# Patient Record
Sex: FEMALE | Race: WHITE | NOT HISPANIC OR LATINO | Employment: OTHER | URBAN - METROPOLITAN AREA
[De-identification: names, ages, dates, MRNs, and addresses within clinical notes are randomized per-mention and may not be internally consistent; named-entity substitution may affect disease eponyms.]

---

## 2017-01-17 ENCOUNTER — MYC MEDICAL ADVICE (OUTPATIENT)
Dept: FAMILY MEDICINE | Facility: CLINIC | Age: 72
End: 2017-01-17

## 2017-01-17 DIAGNOSIS — M17.12 PRIMARY OSTEOARTHRITIS OF LEFT KNEE: Primary | ICD-10-CM

## 2017-01-23 ENCOUNTER — HOSPITAL ENCOUNTER (OUTPATIENT)
Dept: MRI IMAGING | Facility: CLINIC | Age: 72
Discharge: HOME | End: 2017-01-23
Attending: FAMILY MEDICINE | Admitting: FAMILY MEDICINE
Payer: COMMERCIAL

## 2017-01-23 ENCOUNTER — RADIANT APPOINTMENT (OUTPATIENT)
Dept: GENERAL RADIOLOGY | Facility: CLINIC | Age: 72
End: 2017-01-23
Attending: FAMILY MEDICINE
Payer: COMMERCIAL

## 2017-01-23 ENCOUNTER — OFFICE VISIT (OUTPATIENT)
Dept: FAMILY MEDICINE | Facility: CLINIC | Age: 72
End: 2017-01-23
Payer: COMMERCIAL

## 2017-01-23 VITALS
SYSTOLIC BLOOD PRESSURE: 133 MMHG | WEIGHT: 205 LBS | BODY MASS INDEX: 35 KG/M2 | HEART RATE: 70 BPM | DIASTOLIC BLOOD PRESSURE: 77 MMHG | RESPIRATION RATE: 18 BRPM | HEIGHT: 64 IN

## 2017-01-23 DIAGNOSIS — M25.562 ACUTE PAIN OF LEFT KNEE: ICD-10-CM

## 2017-01-23 DIAGNOSIS — E66.9 OBESITY (BMI 35.0-39.9 WITHOUT COMORBIDITY): Primary | ICD-10-CM

## 2017-01-23 DIAGNOSIS — M25.562 LEFT KNEE PAIN: ICD-10-CM

## 2017-01-23 PROCEDURE — 99214 OFFICE O/P EST MOD 30 MIN: CPT | Performed by: FAMILY MEDICINE

## 2017-01-23 PROCEDURE — 73560 X-RAY EXAM OF KNEE 1 OR 2: CPT | Mod: LT

## 2017-01-23 PROCEDURE — 73721 MRI JNT OF LWR EXTRE W/O DYE: CPT | Mod: LT

## 2017-01-23 ASSESSMENT — PAIN SCALES - GENERAL: PAINLEVEL: MODERATE PAIN (5)

## 2017-01-23 NOTE — PROGRESS NOTES
"  SUBJECTIVE:                                                    Marilee Marks is a 71 year old female who presents to clinic today for the following health issues:      Chief Complaint   Patient presents with     Knee Pain     Patient is having discomfort left knee X 2 weeks. Patient feels like she could have twisted it feeding the birds but not sure. Patient is unable to walk. Patient rates pain at 5-10/10. Patient has been using up her old vicodin and ibu 800 which is not of help. Patient has noticed swelling. No redness noted.        Given a steroid injection - Decadron 8 mg 6 days ago.  Uncertain if this was around the pes anserine bursa or intra-articular.  There is no warmth or significant swelling.      She has not had any injury but maybe twisted wrong a few weeks ago.  She has had significant pain with walking or standing.  It's not locking or giving out.    /77 mmHg  Pulse 70  Resp 18  Ht 5' 4\" (1.626 m)  Wt 205 lb (92.987 kg)  BMI 35.17 kg/m2  Breastfeeding? No  EXAM: GENERAL APPEARANCE ADULT: Alert, no acute distress  MS: knee exam swelling-over pes anserine bursa with mild ecchymosis (post injection?), range of motion decreased in flexion and lacking about 10 degrees of extension, exam limited by acuity of pain, positive Terra sign-with external rotation    ASSESSMENT/PLAN:      ICD-10-CM    1. Obesity (BMI 35.0-39.9 without comorbidity) (H) E66.9 Phentermine HCl 37.5 MG TBDP   2. Acute pain of left knee M25.562 MR Knee Left w/o Contrast     CANCELED: MR Low Ext Joint Lt w/o Contrast     Suspect possible meniscus tear based on exam and degree of pain.    MRI for further evaluation, has apt with Dr. Deras on Thursday.    Phentermine refilled, uses sparingly.    Tomasa Rios M.D.      Patient Instructions         Thank you for choosing [facility].  You may be receiving a survey in the mail from [employer] regarding your visit today.  Please take a few minutes to " complete and return the survey to let us know how we are doing.      Our Clinic hours are:  Mondays    7:20 am - 7 pm  Tues -  Fri  7:20 am - 5 pm    Clinic Phone: [phone]    The clinic lab opens at 7:30 am Mon - Fri and appointments are required.    [facility]  Ph. [phone]  Monday-Thursday 8 am - 7pm  Tues/Wed/Fri 8 am - 5:30 pm

## 2017-01-23 NOTE — PATIENT INSTRUCTIONS
Thank you for choosing [facility].  You may be receiving a survey in the mail from [facility] regarding your visit today.  Please take a few minutes to complete and return the survey to let us know how we are doing.      Our Clinic hours are:  Mondays    7:20 am - 7 pm  Tues -  Fri  7:20 am - 5 pm    Clinic Phone: [phone]    The clinic lab opens at 7:30 am Mon - Fri and appointments are required.    [facility]. [phone]  Monday-Thursday 8 am - 7pm  Tues/Wed/Fri 8 am - 5:30 pm

## 2017-01-23 NOTE — NURSING NOTE
"Chief Complaint   Patient presents with     Knee Pain     Patient is having discomfort left knee X 2 weeks. Patient feels like she could have twisted it feeding the birds but not sure. Patient is unable to walk. Patient rates pain at 5-10/10. Patient has been using up her old vicodin and ibu 800 which is not of help. Patient has noticed swelling. No redness noted.        Initial /66 mmHg  Pulse 70  Resp 18  Ht 5' 4\" (1.626 m)  Wt 205 lb (92.987 kg)  BMI 35.17 kg/m2  Breastfeeding? No Estimated body mass index is 35.17 kg/(m^2) as calculated from the following:    Height as of this encounter: 5' 4\" (1.626 m).    Weight as of this encounter: 205 lb (92.987 kg).  BP completed using cuff size: large    "

## 2017-01-23 NOTE — MR AVS SNAPSHOT
After Visit Summary   1/23/2017    Marilee Marks    MRN: [id number]           Patient Information     Date Of Birth          1945        Visit Information        Provider Department      1/23/2017 12:40 PM Tomasa Rios MD [facility]        Today's Diagnoses     Obesity (BMI 35.0-39.9 without comorbidity) (H)    -  1     Acute pain of left knee           Care Instructions          Thank you for choosing [facility].  You may be receiving a survey in the mail from [employer] regarding your visit today.  Please take a few minutes to complete and return the survey to let us know how we are doing.      Our Clinic hours are:  Mondays    7:20 am - 7 pm  Tues -  Fri  7:20 am - 5 pm    Clinic Phone: [phone]    The clinic lab opens at 7:30 am Mon - Fri and appointments are required.    [facility]  Ph. [phone]  Monday-Thursday 8 am - 7pm  Tues/Wed/Fri 8 am - 5:30 pm               Follow-ups after your visit        Future tests that were ordered for you today     Open Future Orders        Priority Expected Expires Ordered    MR Low Ext Joint Lt w/o Contrast Routine  1/23/2018 1/23/2017            Who to contact     If you have questions or need follow up information about today's clinic visit or your schedule please contact [facility] directly at [phone].  Normal or non-critical lab and imaging results will be communicated to you by MyChart, letter or phone within 4 business days after the clinic has received the results. If you do not hear from us within 7 days, please contact the clinic through MyChart or phone. If you have a critical or abnormal lab result, we will notify you by phone as soon as possible.  Submit refill requests through [employer] or call your pharmacy and they will forward the refill request to us. Please allow 3 business days for your refill to be completed.          Additional Information About Your  "Visit        MyChart Information     [employer] gives you secure access to your electronic health record. If you see a primary care provider, you can also send messages to your care team and make appointments. If you have questions, please call your primary care clinic.  If you do not have a primary care provider, please call [phone] and they will assist you.        Care EveryWhere ID     This is your Care EveryWhere ID. This could be used by other organizations to access your [place] medical records  [id number]        Your Vitals Were     Pulse Respirations Height BMI (Body Mass Index) Breastfeeding?       70 18 5' 4\" (1.626 m) 35.17 kg/m2 No        Blood Pressure from Last 3 Encounters:   01/23/17 133/77   10/26/16 134/71   09/19/16 144/70    Weight from Last 3 Encounters:   01/23/17 205 lb (92.987 kg)   12/19/16 200 lb (90.719 kg)   10/26/16 215 lb (97.523 kg)                 Where to get your medicines      Some of these will need a paper prescription and others can be bought over the counter.  Ask your nurse if you have questions.     Bring a paper prescription for each of these medications    - Phentermine HCl 37.5 MG Tbdp       Primary Care Provider Office Phone # Fax #    Tomsaa Rios [phone]       [facility] [address] [facility] [address]        Thank you!     Thank you for choosing [facility]  for your care. Our goal is always to provide you with excellent care. Hearing back from our patients is one way we can continue to improve our services. Please take a few minutes to complete the written survey that you may receive in the mail after your visit with us. Thank you!             Your Updated Medication List - Protect others around you: Learn how to safely use, store and throw away your medicines at www.disposemymeds.org.          This list is accurate as of: 1/23/17  1:12 PM.  Always use your most recent med list.                   Brand Name " Dispense Instructions for use    CALTRATE 600+D 600-400 MG-UNIT per tablet   Generic drug:  calcium-vitamin D      Take one tablet by mouth every day       CENTRUM SILVER per tablet      Take one tablet by mouth every day       ibuprofen 800 MG tablet    ADVIL/MOTRIN    90 tablet    Take 1 tablet (800 mg) by mouth every 8 hours as needed for moderate pain       levothyroxine 137 MCG tablet    SYNTHROID/LEVOTHROID    5 tablet    Take 1 tablet (137 mcg) by mouth daily       Phentermine HCl 37.5 MG Tbdp     15 tablet    Take 18.75 mg by mouth daily 1/2 tablet daily by mouth       topiramate 50 MG tablet    TOPAMAX    10 tablet    Take 1 tablet (50 mg) by mouth 2 times daily       vitamin D3 1000 UNITS Caps      5000mg tablet,  1tab daily

## 2017-01-24 ENCOUNTER — MYC MEDICAL ADVICE (OUTPATIENT)
Dept: FAMILY MEDICINE | Facility: CLINIC | Age: 72
End: 2017-01-24

## 2017-01-24 DIAGNOSIS — E66.9 OBESITY (BMI 35.0-39.9 WITHOUT COMORBIDITY): Primary | ICD-10-CM

## 2017-01-24 NOTE — TELEPHONE ENCOUNTER
Dr. Rios,    Please see my chart message about the need to change pharmacies for her phentermine.  I have sent her a my chart message making sure it is not a prior auth she needs. Please advise. Rosemary BLISS RN

## 2017-01-24 NOTE — TELEPHONE ENCOUNTER
Dr. Rios,    Patient sent my chart message back to us.  So the jest is her insurance does not pay for this any longer so she just wants it filled at [facility] [facility].  Rosemary BLISS RN

## 2017-01-25 NOTE — PROGRESS NOTES
Quick Note:    Discussed with patient. Has apt with Dr. Deras tomorrow.    Tomasa Rios M.D.    ______

## 2017-02-24 ENCOUNTER — TELEPHONE (OUTPATIENT)
Dept: FAMILY MEDICINE | Facility: CLINIC | Age: 72
End: 2017-02-24

## 2017-02-24 DIAGNOSIS — Z11.59 NEED FOR HEPATITIS C SCREENING TEST: Primary | ICD-10-CM

## 2017-02-24 NOTE — TELEPHONE ENCOUNTER
Hep C screen ordered.     Depression removed from  screen and problem list.     Tomasa Rios M.D.

## 2017-02-24 NOTE — TELEPHONE ENCOUNTER
Patient request Hep C screening be ordered as future. Patient also would like to have depression taken off problem list.     Patient also wanted to inform you she is continuing to lose weight.     Sigrid Glover MA

## 2017-03-17 DIAGNOSIS — E03.9 HYPOTHYROIDISM, UNSPECIFIED TYPE: ICD-10-CM

## 2017-03-17 NOTE — TELEPHONE ENCOUNTER
L-Thyroxine   Last Written Prescription Date: 12/26/16  Last Quantity: 90, # refills: 3  Last Office Visit with [facility], [facility] or [facility] prescribing provider: 01/23/17        TSH   Date Value Ref Range Status   09/19/2016 5.96 (H) 0.40 - 4.00 mU/L Final

## 2017-03-23 RX ORDER — LEVOTHYROXINE SODIUM 137 UG/1
137 TABLET ORAL DAILY
Qty: 30 TABLET | Refills: 0 | Status: SHIPPED | OUTPATIENT
Start: 2017-03-23 | End: 2017-04-24

## 2017-04-09 DIAGNOSIS — M79.10 MYALGIA: ICD-10-CM

## 2017-04-10 ENCOUNTER — TRANSFERRED RECORDS (OUTPATIENT)
Dept: HEALTH INFORMATION MANAGEMENT | Facility: CLINIC | Age: 72
End: 2017-04-10

## 2017-04-10 NOTE — TELEPHONE ENCOUNTER
Ibuprofen      Last Written Prescription Date: 12/16/16  Last Quantity: 90, # refills: 2  Last Office Visit with [facility], [facility] or [facility] prescribing provider: 01/23/17       Creatinine   Date Value Ref Range Status   01/29/2016 0.68 0.52 - 1.04 mg/dL Final     Lab Results   Component Value Date    AST 26 06/29/2015     Lab Results   Component Value Date    ALT 37 06/29/2015     BP Readings from Last 3 Encounters:   01/23/17 133/77   10/26/16 134/71   09/19/16 144/70

## 2017-04-12 RX ORDER — IBUPROFEN 800 MG/1
TABLET, FILM COATED ORAL
Qty: 90 TABLET | Refills: 1 | Status: SHIPPED | OUTPATIENT
Start: 2017-04-12 | End: 2018-05-19

## 2017-04-17 ENCOUNTER — TELEPHONE (OUTPATIENT)
Dept: FAMILY MEDICINE | Facility: CLINIC | Age: 72
End: 2017-04-17

## 2017-04-24 ENCOUNTER — OFFICE VISIT (OUTPATIENT)
Dept: FAMILY MEDICINE | Facility: CLINIC | Age: 72
End: 2017-04-24
Payer: COMMERCIAL

## 2017-04-24 VITALS
RESPIRATION RATE: 18 BRPM | DIASTOLIC BLOOD PRESSURE: 68 MMHG | BODY MASS INDEX: 33.63 KG/M2 | HEART RATE: 64 BPM | WEIGHT: 197 LBS | HEIGHT: 64 IN | SYSTOLIC BLOOD PRESSURE: 140 MMHG

## 2017-04-24 DIAGNOSIS — E66.09 NON MORBID OBESITY DUE TO EXCESS CALORIES: ICD-10-CM

## 2017-04-24 DIAGNOSIS — E03.9 HYPOTHYROIDISM, UNSPECIFIED TYPE: ICD-10-CM

## 2017-04-24 DIAGNOSIS — Z11.59 NEED FOR HEPATITIS C SCREENING TEST: ICD-10-CM

## 2017-04-24 DIAGNOSIS — M19.012 ARTHRITIS OF SHOULDER REGION, LEFT: Primary | ICD-10-CM

## 2017-04-24 LAB — TSH SERPL DL<=0.005 MIU/L-ACNC: 0.39 MU/L (ref 0.4–4)

## 2017-04-24 PROCEDURE — 84439 ASSAY OF FREE THYROXINE: CPT | Performed by: FAMILY MEDICINE

## 2017-04-24 PROCEDURE — 86803 HEPATITIS C AB TEST: CPT | Performed by: FAMILY MEDICINE

## 2017-04-24 PROCEDURE — 36415 COLL VENOUS BLD VENIPUNCTURE: CPT | Performed by: FAMILY MEDICINE

## 2017-04-24 PROCEDURE — 84443 ASSAY THYROID STIM HORMONE: CPT | Performed by: FAMILY MEDICINE

## 2017-04-24 PROCEDURE — 99214 OFFICE O/P EST MOD 30 MIN: CPT | Performed by: FAMILY MEDICINE

## 2017-04-24 RX ORDER — LEVOTHYROXINE SODIUM 137 UG/1
137 TABLET ORAL DAILY
Qty: 90 TABLET | Refills: 3 | Status: SHIPPED | OUTPATIENT
Start: 2017-04-24 | End: 2017-04-24

## 2017-04-24 RX ORDER — TOPIRAMATE 50 MG/1
50 TABLET, FILM COATED ORAL 2 TIMES DAILY
Qty: 180 TABLET | Refills: 1 | Status: SHIPPED | OUTPATIENT
Start: 2017-04-24 | End: 2017-12-03

## 2017-04-24 RX ORDER — LEVOTHYROXINE SODIUM 137 UG/1
137 TABLET ORAL DAILY
Qty: 90 TABLET | Refills: 3 | Status: SHIPPED | OUTPATIENT
Start: 2017-04-24 | End: 2018-02-20 | Stop reason: DRUGHIGH

## 2017-04-24 RX ORDER — PHENTERMINE HYDROCHLORIDE 15 MG/1
15 CAPSULE ORAL EVERY MORNING
Qty: 30 CAPSULE | Refills: 1 | Status: SHIPPED | OUTPATIENT
Start: 2017-04-24 | End: 2018-01-11

## 2017-04-24 ASSESSMENT — PAIN SCALES - GENERAL: PAINLEVEL: MODERATE PAIN (5)

## 2017-04-24 NOTE — NURSING NOTE
"Chief Complaint   Patient presents with     Shoulder     Patient would like bilateral shoulders looked at. Patient chiropractor thinks it's muscular. Patient will notice cracking noises in left arm. Patient rates pain 5/10. Patient is not able to take IBU due to stress fracture.        Initial /68  Pulse 66  Resp 18  Ht 5' 4\" (1.626 m)  Wt 197 lb (89.4 kg)  Breastfeeding? No  BMI 33.81 kg/m2 Estimated body mass index is 33.81 kg/(m^2) as calculated from the following:    Height as of this encounter: 5' 4\" (1.626 m).    Weight as of this encounter: 197 lb (89.4 kg).  Medication Reconciliation: complete    "

## 2017-04-24 NOTE — PROGRESS NOTES
SUBJECTIVE:                                                    Marilee Marks is a 71 year old female who presents to clinic today for the following health issues:      Chief Complaint   Patient presents with     Shoulder     Patient would like bilateral shoulders looked at. Patient chiropractor thinks it's muscular. Patient will notice cracking noises in left arm. Patient rates pain 5/10. Patient is not able to take IBU due to stress fracture.      Body mass index is 33.81 kg/(m^2).    Wt Readings from Last 5 Encounters:   04/24/17 197 lb (89.4 kg)   01/23/17 205 lb (93 kg)   12/19/16 200 lb (90.7 kg)   10/26/16 215 lb (97.5 kg)   09/19/16 225 lb 6.4 oz (102.2 kg)       SUBJECTIVE:  Marilee Marks, a 71 year old female scheduled an appointment to discuss the following issues:     Arthritis of shoulder region, left  Hypothyroidism, unspecified type  Need for hepatitis C screening test  Non morbid obesity due to excess calories    Patient is here to discuss shoulder pain.  She feels it's arthritis but her chiropractor was trying to tell her it was muscular.  We did imaging a year ago showing some glenohumeral subluxation and moderate OA.  She has moderate pain but really not enough to take something regularly.  She has been off NSAIDS for several months after a stress fracture of the tibial plateau.  She feels that she notices the shoulder more off of NSAIDS.  We talked about doing acetaminophen 1000 mg twice daily and trying to avoid going back to ibuprofen and reserving that for more severe pain.     She is also encouraged by her weight loss - doing the [employer] Plan, a corwin based diet plan that seems to be more paleo in nature and avoiding white breads/carbs.   She would like to still have phentermine on hand for when she knows she's going to have a hard time (parties, holidays, etc).  But is doing well on the topamax 50 mg twice daily.     Hypothyroidism Follow-up      Since last visit, patient  "describes the following symptoms: Weight loss (intentional), no hair loss, no skin changes, no constipation, no loose stools         Medical, social, surgical, and family histories reviewed.    ROS:  5 point ROS negative except as noted above in HPI, including Gen., Resp., CV, GI &  system review.    OBJECTIVE:  /68  Pulse 64  Resp 18  Ht 5' 4\" (1.626 m)  Wt 197 lb (89.4 kg)  Breastfeeding? No  BMI 33.81 kg/m2  EXAM:  GENERAL APPEARANCE ADULT: Alert, no acute distress  MS: extremities normal, no peripheral edema  shoulder exam: appearance normal, range of motion normal, some crepitus  PSYCH: mentation appears normal., affect and mood normal    ASSESSMENT/PLAN:    (M19.90) Arthritis of shoulder region, left  (primary encounter diagnosis)  Comment:  Tylenol 1000 mg twice daily   Plan: T4 free             (E03.9) Hypothyroidism, unspecified type  Comment:  TSH slightly low but T4 is normal, continue current dose of levothyroxine   Plan: TSH with free T4 reflex, levothyroxine         (SYNTHROID/LEVOTHROID) 137 MCG tablet,         DISCONTINUED: levothyroxine         (SYNTHROID/LEVOTHROID) 137 MCG tablet             (Z11.59) Need for hepatitis C screening test  Comment:    Plan:  Screening pending    (E66.09) Non morbid obesity due to excess calories  Comment:    Plan: topiramate (TOPAMAX) 50 MG tablet, phentermine         15 MG capsule         Understands the phentermine is not meant to be long term and not daily.     Tomasa Rios M.D.          Patient Instructions         Thank you for choosing [facility].  You may be receiving a survey in the mail from [employer] regarding your visit today.  Please take a few minutes to complete and return the survey to let us know how we are doing.      Our Clinic hours are:  Mondays    7:20 am - 7 pm  Tues -  Fri  7:20 am - 5 pm    Clinic Phone: [phone]    The clinic lab opens at 7:30 am Mon - Fri and appointments are required.    [facility]. [phone]  Monday-Thursday 8 am - 7pm  Tues/Wed/Fri 8 am - 5:30 pm

## 2017-04-24 NOTE — PATIENT INSTRUCTIONS
Thank you for choosing [facility].  You may be receiving a survey in the mail from [facility] regarding your visit today.  Please take a few minutes to complete and return the survey to let us know how we are doing.      Our Clinic hours are:  Mondays    7:20 am - 7 pm  Tues -  Fri  7:20 am - 5 pm    Clinic Phone: [phone]    The clinic lab opens at 7:30 am Mon - Fri and appointments are required.    [facility]. [phone]  Monday-Thursday 8 am - 7pm  Tues/Wed/Fri 8 am - 5:30 pm

## 2017-04-24 NOTE — MR AVS SNAPSHOT
After Visit Summary   4/24/2017    Marilee Marks    MRN: [id number]           Patient Information     Date Of Birth          1945        Visit Information        Provider Department      4/24/2017 10:40 AM Tomasa Rios MD [facility]        Today's Diagnoses     Arthritis of shoulder region, left    -  1    Hypothyroidism, unspecified type        Need for hepatitis C screening test        Morbid obesity, unspecified obesity type (H)          Care Instructions          Thank you for choosing [facility].  You may be receiving a survey in the mail from [employer] regarding your visit today.  Please take a few minutes to complete and return the survey to let us know how we are doing.      Our Clinic hours are:  Mondays    7:20 am - 7 pm  Tues -  Fri  7:20 am - 5 pm    Clinic Phone: [phone]    The clinic lab opens at 7:30 am Mon - Fri and appointments are required.    [facility]  Ph. [phone]  Monday-Thursday 8 am - 7pm  Tues/Wed/Fri 8 am - 5:30 pm               Follow-ups after your visit        Who to contact     If you have questions or need follow up information about today's clinic visit or your schedule please contact [facility] directly at [phone].  Normal or non-critical lab and imaging results will be communicated to you by [employer], letter or phone within 4 business days after the clinic has received the results. If you do not hear from us within 7 days, please contact the clinic through [employer] or phone. If you have a critical or abnormal lab result, we will notify you by phone as soon as possible.  Submit refill requests through [employer] or call your pharmacy and they will forward the refill request to us. Please allow 3 business days for your refill to be completed.          Additional Information About Your Visit        AgFlow Information     [employer] gives you secure access to your electronic health  "record. If you see a primary care provider, you can also send messages to your care team and make appointments. If you have questions, please call your primary care clinic.  If you do not have a primary care provider, please call [phone] and they will assist you.        Care EveryWhere ID     This is your Care EveryWhere ID. This could be used by other organizations to access your [place] medical records  [id number]        Your Vitals Were     Pulse Respirations Height Breastfeeding? BMI (Body Mass Index)       64 18 5' 4\" (1.626 m) No 33.81 kg/m2        Blood Pressure from Last 3 Encounters:   04/24/17 140/68   01/23/17 133/77   10/26/16 134/71    Weight from Last 3 Encounters:   04/24/17 197 lb (89.4 kg)   01/23/17 205 lb (93 kg)   12/19/16 200 lb (90.7 kg)              We Performed the Following     Hepatitis C antibody     TSH with free T4 reflex          Today's Medication Changes          These changes are accurate as of: 4/24/17 12:04 PM.  If you have any questions, ask your nurse or doctor.               Start taking these medicines.        Dose/Directions    levothyroxine 137 MCG tablet   Commonly known as:  SYNTHROID/LEVOTHROID   Used for:  Hypothyroidism, unspecified type   Started by:  Tomasa Rios MD        Dose:  137 mcg   Take 1 tablet (137 mcg) by mouth daily   Quantity:  90 tablet   Refills:  3       phentermine 15 MG capsule   Used for:  Morbid obesity, unspecified obesity type (H)   Replaces:  Phentermine HCl 37.5 MG Tbdp   Started by:  Tomasa Rios MD        Dose:  15 mg   Take 1 capsule (15 mg) by mouth every morning   Quantity:  30 capsule   Refills:  1         Stop taking these medicines if you haven't already. Please contact your care team if you have questions.     Phentermine HCl 37.5 MG Tbdp   Replaced by:  phentermine 15 MG capsule   Stopped by:  Tomasa Rios MD                Where to get your medicines      These medications were sent to [facility] " - [place], [place] - [address]  [address] [address]     Phone:  [phone]     levothyroxine 137 MCG tablet    topiramate 50 MG tablet         Some of these will need a paper prescription and others can be bought over the counter.  Ask your nurse if you have questions.     Bring a paper prescription for each of these medications     phentermine 15 MG capsule                Primary Care Provider Office Phone # Fax #    Tomasa Rios [phone]       [facility] [address] [address]        Thank you!     Thank you for choosing [facility]  for your care. Our goal is always to provide you with excellent care. Hearing back from our patients is one way we can continue to improve our services. Please take a few minutes to complete the written survey that you may receive in the mail after your visit with us. Thank you!             Your Updated Medication List - Protect others around you: Learn how to safely use, store and throw away your medicines at www.disposemymeds.org.          This list is accurate as of: 4/24/17 12:04 PM.  Always use your most recent med list.                   Brand Name Dispense Instructions for use    CALTRATE 600+D 600-400 MG-UNIT per tablet   Generic drug:  calcium-vitamin D      Take one tablet by mouth every day       CENTRUM SILVER per tablet      Take one tablet by mouth every day       ibuprofen 800 MG tablet    ADVIL/MOTRIN    90 tablet    TAKE 1 TABLET EVERY 8 HOURS AS NEEDED FOR MODERATE PAIN       levothyroxine 137 MCG tablet    SYNTHROID/LEVOTHROID    90 tablet    Take 1 tablet (137 mcg) by mouth daily       phentermine 15 MG capsule     30 capsule    Take 1 capsule (15 mg) by mouth every morning       topiramate 50 MG tablet    TOPAMAX    180 tablet    Take 1 tablet (50 mg) by mouth 2 times daily       vitamin D3 1000 UNITS Caps      5000mg tablet,  1tab daily

## 2017-04-25 LAB
HCV AB SERPL QL IA: NORMAL
T4 FREE SERPL-MCNC: 1.03 NG/DL (ref 0.76–1.46)

## 2017-06-09 ENCOUNTER — RADIANT APPOINTMENT (OUTPATIENT)
Dept: MAMMOGRAPHY | Facility: CLINIC | Age: 72
End: 2017-06-09
Attending: FAMILY MEDICINE
Payer: COMMERCIAL

## 2017-06-09 DIAGNOSIS — Z12.31 VISIT FOR SCREENING MAMMOGRAM: ICD-10-CM

## 2017-06-09 PROCEDURE — G0202 SCR MAMMO BI INCL CAD: HCPCS | Mod: TC

## 2017-09-25 ENCOUNTER — OFFICE VISIT (OUTPATIENT)
Dept: FAMILY MEDICINE | Facility: CLINIC | Age: 72
End: 2017-09-25
Payer: COMMERCIAL

## 2017-09-25 VITALS
BODY MASS INDEX: 33.12 KG/M2 | WEIGHT: 194 LBS | SYSTOLIC BLOOD PRESSURE: 150 MMHG | DIASTOLIC BLOOD PRESSURE: 60 MMHG | HEART RATE: 69 BPM | HEIGHT: 64 IN

## 2017-09-25 DIAGNOSIS — R03.0 ELEVATED BLOOD PRESSURE READING WITHOUT DIAGNOSIS OF HYPERTENSION: ICD-10-CM

## 2017-09-25 DIAGNOSIS — Z23 NEED FOR PROPHYLACTIC VACCINATION AND INOCULATION AGAINST INFLUENZA: ICD-10-CM

## 2017-09-25 DIAGNOSIS — E03.9 HYPOTHYROIDISM, UNSPECIFIED TYPE: ICD-10-CM

## 2017-09-25 DIAGNOSIS — E66.9 NON MORBID OBESITY, UNSPECIFIED OBESITY TYPE: ICD-10-CM

## 2017-09-25 DIAGNOSIS — M19.91 PRIMARY OSTEOARTHRITIS, UNSPECIFIED SITE: Primary | ICD-10-CM

## 2017-09-25 LAB
ANION GAP SERPL CALCULATED.3IONS-SCNC: 6 MMOL/L (ref 3–14)
BUN SERPL-MCNC: 18 MG/DL (ref 7–30)
CALCIUM SERPL-MCNC: 9.8 MG/DL (ref 8.5–10.1)
CHLORIDE SERPL-SCNC: 112 MMOL/L (ref 94–109)
CO2 SERPL-SCNC: 23 MMOL/L (ref 20–32)
CREAT SERPL-MCNC: 0.79 MG/DL (ref 0.52–1.04)
GFR SERPL CREATININE-BSD FRML MDRD: 72 ML/MIN/1.7M2
GLUCOSE SERPL-MCNC: 84 MG/DL (ref 70–99)
POTASSIUM SERPL-SCNC: 4.1 MMOL/L (ref 3.4–5.3)
SODIUM SERPL-SCNC: 141 MMOL/L (ref 133–144)
T4 FREE SERPL-MCNC: 1.01 NG/DL (ref 0.76–1.46)
TSH SERPL DL<=0.005 MIU/L-ACNC: 0.29 MU/L (ref 0.4–4)

## 2017-09-25 PROCEDURE — 90662 IIV NO PRSV INCREASED AG IM: CPT | Performed by: FAMILY MEDICINE

## 2017-09-25 PROCEDURE — 99214 OFFICE O/P EST MOD 30 MIN: CPT | Mod: 25 | Performed by: FAMILY MEDICINE

## 2017-09-25 PROCEDURE — 36415 COLL VENOUS BLD VENIPUNCTURE: CPT | Performed by: FAMILY MEDICINE

## 2017-09-25 PROCEDURE — 84439 ASSAY OF FREE THYROXINE: CPT | Performed by: FAMILY MEDICINE

## 2017-09-25 PROCEDURE — G0008 ADMIN INFLUENZA VIRUS VAC: HCPCS | Performed by: FAMILY MEDICINE

## 2017-09-25 PROCEDURE — 80048 BASIC METABOLIC PNL TOTAL CA: CPT | Performed by: FAMILY MEDICINE

## 2017-09-25 PROCEDURE — 84443 ASSAY THYROID STIM HORMONE: CPT | Performed by: FAMILY MEDICINE

## 2017-09-25 RX ORDER — CELECOXIB 200 MG/1
200 CAPSULE ORAL DAILY
Qty: 90 CAPSULE | Refills: 1 | Status: SHIPPED | OUTPATIENT
Start: 2017-09-25 | End: 2017-11-13

## 2017-09-25 NOTE — PATIENT INSTRUCTIONS
"  Recheck blood pressure on Wednesday.  Needs to be <140/90 to consider phentermine.    BP Readings from Last 6 Encounters:   09/25/17 150/60   04/24/17 140/68   01/23/17 133/77   10/26/16 134/71   09/19/16 144/70   08/19/16 124/66     Celebrex 200 mg daily - do not take additional ibuprofen but it's okay to take tylenol.        Resources: - \"The Obesity Code\"- Book by Jose Guadalupe Deleon MD   - [employer] (includes lots of great recipes, including low carb bread and pizza!)    Tomasa Rios M.D.        Thank you for choosing [facility].  You may be receiving a survey in the mail from [employer] regarding your visit today.  Please take a few minutes to complete and return the survey to let us know how we are doing.      Our Clinic hours are:  Mondays    7:20 am - 7 pm  Tues -  Fri  7:20 am - 5 pm    Clinic Phone: [phone]    The clinic lab opens at 7:30 am Mon - Fri and appointments are required.    [facility]  Ph. [phone]  Monday-Thursday 8 am - 7pm  Tues/Wed/Fri 8 am - 5:30 pm         "

## 2017-09-25 NOTE — NURSING NOTE
"Chief Complaint   Patient presents with     Weight Loss     Patient would like to go back on medication to help with weight loss.      Flu Shot     Arthritis     Patient has been taking ibu and extra strength tylenol due to both shoulders and bilateral knee. Patient rates pain at a 8/10. Patient states \"without ibu or tylenol she couldn't walk\"       Initial /67  Pulse 69  Ht 5' 4\" (1.626 m)  Wt 194 lb (88 kg)  Breastfeeding? No  BMI 33.3 kg/m2 Estimated body mass index is 33.3 kg/(m^2) as calculated from the following:    Height as of this encounter: 5' 4\" (1.626 m).    Weight as of this encounter: 194 lb (88 kg).  Medication Reconciliation: complete    "

## 2017-09-25 NOTE — PROGRESS NOTES
"  SUBJECTIVE:   Marilee Marks is a 71 year old female who presents to clinic today for the following health issues:      Chief Complaint   Patient presents with     Weight Loss     Patient would like to go back on medication to help with weight loss.      Flu Shot     Arthritis     Patient has been taking ibu and extra strength tylenol due to both shoulders and bilateral knee. Patient rates pain at a 8/10. Patient states \"without ibu or tylenol she couldn't walk\"     Wt Readings from Last 5 Encounters:   09/25/17 194 lb (88 kg)   04/24/17 197 lb (89.4 kg)   01/23/17 205 lb (93 kg)   12/19/16 200 lb (90.7 kg)   10/26/16 215 lb (97.5 kg)     Body mass index is 33.3 kg/(m^2).    Patient would like to restart phentermine which has helped jump start weightloss in the past.  She has been doing the \"Luther Plan\" which is low carb, ancient grains, fruits/veg.  Doesn't sound like she's really been counting carbs or keeping to low carb/keto diet.     She does take regular ibuprofen and tylenol for her joint/muscular pain.  Continues to be bothered by right knee pain despite having had a TKA.           Had had a tremendous amount of family stress in her life in the past few weeks. Granddaughter was in the hospital with a CF exacerbation, her son in law who is an alcoholic attacked her daughter and he's now in [place] [employer].  Another family member just filed for divorce, etc.  She's also working on a large  and busy with that.     /60  Pulse 69  Ht 5' 4\" (1.626 m)  Wt 194 lb (88 kg)  Breastfeeding? No  BMI 33.3 kg/m2  EXAM: GENERAL APPEARANCE: Alert, no acute distress  PSYCH: mentation appears normal., affect and mood normal    ASSESSMENT/PLAN:      ICD-10-CM    1. Primary osteoarthritis, unspecified site M19.91 Basic metabolic panel     celecoxib (CELEBREX) 200 MG capsule   2. Hypothyroidism, unspecified type E03.9 TSH with free T4 reflex   3. Need for prophylactic vaccination and inoculation " "against influenza Z23 FLU VACCINE, INCREASED ANTIGEN, PRESV FREE, AGE 65+ [99568]     Vaccine Administration, Initial [55253]     Patient would like to come back in a few days and have her blood pressure rechecked.  She feels the value today is not indicative of where her blood pressure usually runs.  She understands that I cannot give her a stimulant if her blood pressure is elevated.    Will try Celebrex for her joint pain, avoid other nsaids. Could also consider cymbalta in the future.    Tomasa Rios M.D.      Patient Instructions       Recheck blood pressure on Wednesday.  Needs to be <140/90 to consider phentermine.    BP Readings from Last 6 Encounters:   09/25/17 150/60   04/24/17 140/68   01/23/17 133/77   10/26/16 134/71   09/19/16 144/70   08/19/16 124/66     Celebrex 200 mg daily - do not take additional ibuprofen but it's okay to take tylenol.        Resources: - \"The Obesity Code\"- Book by Jose Guadalupe Deleon MD   - [employer] (includes lots of great recipes, including low carb bread and pizza!)    Tomasa Rios M.D.        Thank you for choosing [facility].  You may be receiving a survey in the mail from [facility] regarding your visit today.  Please take a few minutes to complete and return the survey to let us know how we are doing.      Our Clinic hours are:  Mondays    7:20 am - 7 pm  Tues -  Fri  7:20 am - 5 pm    Clinic Phone: [phone]    The clinic lab opens at 7:30 am Mon - Fri and appointments are required.    [facility]. [phone]  Monday-Thursday 8 am - 7pm  Tues/Wed/Fri 8 am - 5:30 pm                   "

## 2017-09-25 NOTE — MR AVS SNAPSHOT
"              After Visit Summary   9/25/2017    Marilee Marks    MRN: [id number]           Patient Information     Date Of Birth          1945        Visit Information        Provider Department      9/25/2017 10:40 AM Tomasa Rios MD [facility]        Today's Diagnoses     Primary osteoarthritis, unspecified site    -  1    Hypothyroidism, unspecified type          Care Instructions      Recheck blood pressure on Wednesday.  Needs to be <140/90 to consider phentermine.    BP Readings from Last 6 Encounters:   09/25/17 150/60   04/24/17 140/68   01/23/17 133/77   10/26/16 134/71   09/19/16 144/70   08/19/16 124/66     Celebrex 200 mg daily - do not take additional ibuprofen but it's okay to take tylenol.        Resources: - \"The Obesity Code\"- Book by Jose Guadalupe Deleon MD   - [employer] (includes lots of great recipes, including low carb bread and pizza!)    Tomasa Rios M.D.        Thank you for choosing [facility].  You may be receiving a survey in the mail from [employer] regarding your visit today.  Please take a few minutes to complete and return the survey to let us know how we are doing.      Our Clinic hours are:  Mondays    7:20 am - 7 pm  Tues -  Fri  7:20 am - 5 pm    Clinic Phone: [phone]    The clinic lab opens at 7:30 am Mon - Fri and appointments are required.    [facility]  Ph. [phone]  Monday-Thursday 8 am - 7pm  Tues/Wed/Fri 8 am - 5:30 pm                 Follow-ups after your visit        Who to contact     If you have questions or need follow up information about today's clinic visit or your schedule please contact [facility] directly at [phone].  Normal or non-critical lab and imaging results will be communicated to you by MyChart, letter or phone within 4 business days after the clinic has received the results. If you do not hear from us within 7 days, please contact the clinic through MyChart " "or phone. If you have a critical or abnormal lab result, we will notify you by phone as soon as possible.  Submit refill requests through [employer] or call your pharmacy and they will forward the refill request to us. Please allow 3 business days for your refill to be completed.          Additional Information About Your Visit        Ev[employer]nethart Information     [employer] gives you secure access to your electronic health record. If you see a primary care provider, you can also send messages to your care team and make appointments. If you have questions, please call your primary care clinic.  If you do not have a primary care provider, please call [phone] and they will assist you.        Care EveryWhere ID     This is your Care EveryWhere ID. This could be used by other organizations to access your [place] medical records  [id number]        Your Vitals Were     Pulse Height Breastfeeding? BMI (Body Mass Index)          69 5' 4\" (1.626 m) No 33.3 kg/m2         Blood Pressure from Last 3 Encounters:   09/25/17 150/60   04/24/17 140/68   01/23/17 133/77    Weight from Last 3 Encounters:   09/25/17 194 lb (88 kg)   04/24/17 197 lb (89.4 kg)   01/23/17 205 lb (93 kg)              We Performed the Following     Basic metabolic panel     TSH with free T4 reflex          Today's Medication Changes          These changes are accurate as of: 9/25/17 11:13 AM.  If you have any questions, ask your nurse or doctor.               Start taking these medicines.        Dose/Directions    celecoxib 200 MG capsule   Commonly known as:  celeBREX   Used for:  Primary osteoarthritis, unspecified site   Started by:  Tomasa Rios MD        Dose:  200 mg   Take 1 capsule (200 mg) by mouth daily   Quantity:  90 capsule   Refills:  1            Where to get your medicines      These medications were sent to [facility] [place], [place] - [address]  [address], [facility] [address]     " Phone:  [phone]     celecoxib 200 MG capsule                Primary Care Provider Office Phone # Fax #    Tomasa Rios [phone] [address]  [facility] [address]        Equal Access to Services     CAROLCHRIS ROSITA : Brodie lexa hampton navino Sothelmaali, waaxda luqadaha, qaybta kaalmada adetabathayada, angel osegueran bhumi guido laharveycyrus boyle. So [facility] [phone].    ATENCIÓN: Si habla español, tiene a plamer disposición servicios gratuitos de asistencia lingüística. Llame al [phone].    We comply with applicable federal civil rights laws and [place] laws. We do not discriminate on the basis of race, color, national origin, age, disability sex, sexual orientation or gender identity.            Thank you!     Thank you for choosing [facility]  for your care. Our goal is always to provide you with excellent care. Hearing back from our patients is one way we can continue to improve our services. Please take a few minutes to complete the written survey that you may receive in the mail after your visit with us. Thank you!             Your Updated Medication List - Protect others around you: Learn how to safely use, store and throw away your medicines at www.disposemymeds.org.          This list is accurate as of: 9/25/17 11:13 AM.  Always use your most recent med list.                   Brand Name Dispense Instructions for use Diagnosis    CALTRATE 600+D 600-400 MG-UNIT per tablet   Generic drug:  calcium-vitamin D      Take one tablet by mouth every day        celecoxib 200 MG capsule    celeBREX    90 capsule    Take 1 capsule (200 mg) by mouth daily    Primary osteoarthritis, unspecified site       CENTRUM SILVER per tablet      Take one tablet by mouth every day    Preop general physical exam, Hypothyroidism, Gallbladder & bile duct stone with obstruction, Obesity       ibuprofen 800 MG tablet    ADVIL/MOTRIN    90 tablet    TAKE 1 TABLET EVERY 8 HOURS AS NEEDED FOR  MODERATE PAIN    Myalgia       levothyroxine 137 MCG tablet    SYNTHROID/LEVOTHROID    90 tablet    Take 1 tablet (137 mcg) by mouth daily    Hypothyroidism, unspecified type       phentermine 15 MG capsule     30 capsule    Take 1 capsule (15 mg) by mouth every morning    Non morbid obesity due to excess calories       topiramate 50 MG tablet    TOPAMAX    180 tablet    Take 1 tablet (50 mg) by mouth 2 times daily    Non morbid obesity due to excess calories       vitamin D3 1000 UNITS Caps      5000mg tablet,  1tab daily

## 2017-09-25 NOTE — PROGRESS NOTES
Injectable Influenza Immunization Documentation    1.  Is the person to be vaccinated sick today?   No    2. Does the person to be vaccinated have an allergy to a component   of the vaccine?   No    3. Has the person to be vaccinated ever had a serious reaction   to influenza vaccine in the past?   No    4. Has the person to be vaccinated ever had Guillain-Barré syndrome?   No    Form completed by Sigrid Glover MA

## 2017-09-27 NOTE — PROGRESS NOTES
Kidney function is good.  TSH is low, indicating that you're getting too much levothyroxine.  I would cut one dose in half once a week and we can recheck in a few months.  My guess is that because thyroid medication is weight based, you're just getting too much now that you are thinner.     Tomasa Rios M.D.

## 2017-11-13 ENCOUNTER — TELEPHONE (OUTPATIENT)
Dept: FAMILY MEDICINE | Facility: CLINIC | Age: 72
End: 2017-11-13

## 2017-11-13 DIAGNOSIS — M19.91 PRIMARY OSTEOARTHRITIS, UNSPECIFIED SITE: ICD-10-CM

## 2017-11-13 RX ORDER — CELECOXIB 200 MG/1
200 CAPSULE ORAL DAILY
Qty: 90 CAPSULE | Refills: 1 | Status: SHIPPED | OUTPATIENT
Start: 2017-11-13 | End: 2018-04-17

## 2017-12-03 DIAGNOSIS — E66.09 NON MORBID OBESITY DUE TO EXCESS CALORIES: ICD-10-CM

## 2017-12-05 NOTE — TELEPHONE ENCOUNTER
Routing refill request to provider for review/approval because:  Would you like her to continue this medication. Topiramate.    Thank you  Karena SIMONS RN

## 2017-12-06 RX ORDER — TOPIRAMATE 50 MG/1
TABLET, FILM COATED ORAL
Qty: 180 TABLET | Refills: 1 | Status: SHIPPED | OUTPATIENT
Start: 2017-12-06 | End: 2018-04-20

## 2018-01-11 ENCOUNTER — RADIANT APPOINTMENT (OUTPATIENT)
Dept: GENERAL RADIOLOGY | Facility: CLINIC | Age: 73
End: 2018-01-11
Attending: FAMILY MEDICINE
Payer: COMMERCIAL

## 2018-01-11 ENCOUNTER — OFFICE VISIT (OUTPATIENT)
Dept: FAMILY MEDICINE | Facility: CLINIC | Age: 73
End: 2018-01-11
Payer: COMMERCIAL

## 2018-01-11 VITALS
HEART RATE: 67 BPM | BODY MASS INDEX: 31.76 KG/M2 | SYSTOLIC BLOOD PRESSURE: 130 MMHG | HEIGHT: 64 IN | RESPIRATION RATE: 18 BRPM | DIASTOLIC BLOOD PRESSURE: 67 MMHG | WEIGHT: 186 LBS

## 2018-01-11 DIAGNOSIS — M15.9 GENERALIZED OSTEOARTHRITIS: ICD-10-CM

## 2018-01-11 DIAGNOSIS — M18.11 DEGENERATIVE ARTHRITIS OF THUMB, RIGHT: ICD-10-CM

## 2018-01-11 DIAGNOSIS — M19.012 PRIMARY OSTEOARTHRITIS OF LEFT SHOULDER: Primary | ICD-10-CM

## 2018-01-11 PROCEDURE — 20610 DRAIN/INJ JOINT/BURSA W/O US: CPT | Mod: LT | Performed by: FAMILY MEDICINE

## 2018-01-11 PROCEDURE — 99214 OFFICE O/P EST MOD 30 MIN: CPT | Mod: 25 | Performed by: FAMILY MEDICINE

## 2018-01-11 PROCEDURE — 73140 X-RAY EXAM OF FINGER(S): CPT | Mod: RT

## 2018-01-11 RX ORDER — TRIAMCINOLONE ACETONIDE 40 MG/ML
40 INJECTION, SUSPENSION INTRA-ARTICULAR; INTRAMUSCULAR ONCE
Qty: 1 ML | Refills: 0 | OUTPATIENT
Start: 2018-01-11 | End: 2018-01-11

## 2018-01-11 NOTE — NURSING NOTE
"Chief Complaint   Patient presents with     Arthritis     Patient is here to discuss arthritis. Patient is taking ibu in the afternoon and celebrex at night. Patient has constant pain rated at a 5/10. Celebrex helps but wears off throughout the day.        Initial /67  Pulse 67  Resp 18  Ht 5' 4\" (1.626 m)  Wt 186 lb (84.4 kg)  Breastfeeding? No  BMI 31.93 kg/m2 Estimated body mass index is 31.93 kg/(m^2) as calculated from the following:    Height as of this encounter: 5' 4\" (1.626 m).    Weight as of this encounter: 186 lb (84.4 kg).  Medication Reconciliation: complete    "

## 2018-01-11 NOTE — MR AVS SNAPSHOT
After Visit Summary   1/11/2018    Marilee Marks    MRN: [id number]           Patient Information     Date Of Birth          1945        Visit Information        Provider Department      1/11/2018 9:40 AM Tomasa Rios MD [facility]        Today's Diagnoses     Degenerative arthritis of thumb, right    -  1      Care Instructions      Two extra strength tylenol 2-3 times a day    Glucosamine and Chondroitin Sulfate over the counter     Steroid injection in the left shoulder today    Tomasa Rios M.D.      Thank you for choosing [facility].  You may be receiving a survey in the mail from [employer] regarding your visit today.  Please take a few minutes to complete and return the survey to let us know how we are doing.      Our Clinic hours are:  Mondays    7:20 am - 7 pm  Tues -  Fri  7:20 am - 5 pm    Clinic Phone: [phone]    The clinic lab opens at 7:30 am Mon - Fri and appointments are required.    [facility]  Ph. [phone]  Monday-Thursday 8 am - 7pm  Tues/Wed/Fri 8 am - 5:30 pm                 Follow-ups after your visit        Who to contact     If you have questions or need follow up information about today's clinic visit or your schedule please contact [facility] directly at [phone].  Normal or non-critical lab and imaging results will be communicated to you by MyChart, letter or phone within 4 business days after the clinic has received the results. If you do not hear from us within 7 days, please contact the clinic through MyChart or phone. If you have a critical or abnormal lab result, we will notify you by phone as soon as possible.  Submit refill requests through [employer] or call your pharmacy and they will forward the refill request to us. Please allow 3 business days for your refill to be completed.          Additional Information About Your Visit        MyChart Information     [employer] gives  "you secure access to your electronic health record. If you see a primary care provider, you can also send messages to your care team and make appointments. If you have questions, please call your primary care clinic.  If you do not have a primary care provider, please call [phone] and they will assist you.        Care EveryWhere ID     This is your Care EveryWhere ID. This could be used by other organizations to access your [place] medical records  [id number]        Your Vitals Were     Pulse Respirations Height Breastfeeding? BMI (Body Mass Index)       67 18 5' 4\" (1.626 m) No 31.93 kg/m2        Blood Pressure from Last 3 Encounters:   01/11/18 130/67   09/25/17 150/60   04/24/17 140/68    Weight from Last 3 Encounters:   01/11/18 186 lb (84.4 kg)   09/25/17 194 lb (88 kg)   04/24/17 197 lb (89.4 kg)              We Performed the Following     XR Finger Right G/E 2 Views        Primary Care Provider Office Phone # Fax #    Tomasa Rios [phone]       [address] [address] [address]        Equal Access to Services     [facility] AH: Hadii aad ku hadasho Soomaali, waaxda luqadaha, qaybta kaalmada adeegyada, waxay idiin hayaan adetabatha guido la'huseyinn ah. So [facility] [phone].    ATENCIÓN: Si habla español, tiene a palmer disposición servicios gratuitos de asistencia lingüística. Llame al [phone].    We comply with applicable federal civil rights laws and [place] laws. We do not discriminate on the basis of race, color, national origin, age, disability, sex, sexual orientation, or gender identity.            Thank you!     Thank you for choosing [facility]  for your care. Our goal is always to provide you with excellent care. Hearing back from our patients is one way we can continue to improve our services. Please take a few minutes to complete the written survey that you may receive in the mail after your visit with us. Thank you!             Your Updated " Medication List - Protect others around you: Learn how to safely use, store and throw away your medicines at www.disposemymeds.org.          This list is accurate as of: 1/11/18 10:40 AM.  Always use your most recent med list.                   Brand Name Dispense Instructions for use Diagnosis    CALTRATE 600+D 600-400 MG-UNIT per tablet   Generic drug:  calcium-vitamin D      Take one tablet by mouth every day        celecoxib 200 MG capsule    celeBREX    90 capsule    Take 1 capsule (200 mg) by mouth daily    Primary osteoarthritis, unspecified site       CENTRUM SILVER per tablet      Take one tablet by mouth every day    Preop general physical exam, Hypothyroidism, Gallbladder & bile duct stone with obstruction, Obesity       ibuprofen 800 MG tablet    ADVIL/MOTRIN    90 tablet    TAKE 1 TABLET EVERY 8 HOURS AS NEEDED FOR MODERATE PAIN    Myalgia       levothyroxine 137 MCG tablet    SYNTHROID/LEVOTHROID    90 tablet    Take 1 tablet (137 mcg) by mouth daily    Hypothyroidism, unspecified type       topiramate 50 MG tablet    TOPAMAX    180 tablet    TAKE 1 TABLET TWICE A DAY    Non morbid obesity due to excess calories       TURMERIC PO           vitamin D3 1000 UNITS Caps      5000mg tablet,  1tab daily

## 2018-01-11 NOTE — PATIENT INSTRUCTIONS
Two extra strength tylenol 2-3 times a day    Glucosamine and Chondroitin Sulfate over the counter     Steroid injection in the left shoulder today    Tomasa Rios M.D.      Thank you for choosing [facility].  You may be receiving a survey in the mail from [facility] regarding your visit today.  Please take a few minutes to complete and return the survey to let us know how we are doing.      Our Clinic hours are:  Mondays    7:20 am - 7 pm  Tues -  Fri  7:20 am - 5 pm    Clinic Phone: [phone]    The clinic lab opens at 7:30 am Mon - Fri and appointments are required.    [facility]  Ph. [phone]  Monday-Thursday 8 am - 7pm  Tues/Wed/Fri 8 am - 5:30 pm

## 2018-01-11 NOTE — PROGRESS NOTES
"  SUBJECTIVE:   Marilee Marks is a 72 year old female who presents to clinic today for the following health issues:      Chief Complaint   Patient presents with     Arthritis     Patient is here to discuss arthritis. Patient is taking ibu in the afternoon and celebrex at night. Patient has constant pain rated at a 5/10. Celebrex helps but wears off throughout the day.      Patient is overall doing well, continues to lose weight and is happy about that. Plans to start a work out program with weights and a  and is concerned about her arthritis.  Also wants to know if it's okay that she's taking both ibuprofen and celebrex, we discussed that this would not be recommended as they're both NSAIDS and increase the risk of renal complications and ulcers.    She is primarily concerned about the left shoulder pain.  Last xrayed almost two years ago and showed significant arthritis at that time.      /67  Pulse 67  Resp 18  Ht 5' 4\" (1.626 m)  Wt 186 lb (84.4 kg)  Breastfeeding? No  BMI 31.93 kg/m2  EXAM: GENERAL APPEARANCE ADULT: Alert, no acute distress  MS: shoulder exam: appearance normal, range of motion: decreased to 120 degrees on the left, strength in abduction normal  hand exam deformity and pain at the MP joint and less pain at the CMC joint.      Xray:  Sesamoid bone at the MP joint and moderate arthritic changes.  Moderate degenerative changes at the CMC joint , pending review by radiologist.      Risks, benefits and alternatives discussed   A steroid injection was performed at left shoulder posterior approach using 1% plain Lidocaine and 40 mg of Kenalog. This was well tolerated.    ASSESSMENT/PLAN:      ICD-10-CM    1. Primary osteoarthritis of left shoulder M19.012 triamcinolone acetonide (KENALOG-40) 40 MG/ML injection     TRIAMCINOLONE ACET INJ NOS     DRAIN/INJECT LARGE JOINT/BURSA   2. Degenerative arthritis of thumb, right M18.11 XR Finger Right G/E 2 Views   3. " Generalized osteoarthritis M15.9    stop the ibuprofen if taking regular celebrex.    Patient Instructions     Two extra strength tylenol 2-3 times a day    Glucosamine and Chondroitin Sulfate over the counter     Steroid injection in the left shoulder today    Tomasa Rios M.D.      Thank you for choosing [facility].  You may be receiving a survey in the mail from [employer] regarding your visit today.  Please take a few minutes to complete and return the survey to let us know how we are doing.      Our Clinic hours are:  Mondays    7:20 am - 7 pm  Tues -  Fri  7:20 am - 5 pm    Clinic Phone: [phone]    The clinic lab opens at 7:30 am Mon - Fri and appointments are required.    [facility]. [phone]  Monday-Thursday 8 am - 7pm  Tues/Wed/Fri 8 am - 5:30 pm

## 2018-02-09 ENCOUNTER — TELEPHONE (OUTPATIENT)
Dept: FAMILY MEDICINE | Facility: CLINIC | Age: 73
End: 2018-02-09

## 2018-02-09 DIAGNOSIS — E03.9 HYPOTHYROIDISM, UNSPECIFIED TYPE: ICD-10-CM

## 2018-02-12 NOTE — TELEPHONE ENCOUNTER
"Requested Prescriptions   Pending Prescriptions Disp Refills     levothyroxine (SYNTHROID/LEVOTHROID) 137 MCG tablet [Pharmacy Med Name: L-THYROXINE (SYNTHROID) TABS 137MCG] 30 tablet 0     Sig: TAKE 1 TABLET DAILY (NEED LAB WORK)    Thyroid Protocol Failed    2/9/2018  9:33 PM       Failed - Normal TSH on file in past 12 months    Recent Labs   Lab Test  09/25/17   1126   TSH  0.29*             Passed - Patient is 12 years or older       Passed - Recent or future visit with authorizing provider's specialty    Patient had office visit in the last year or has a visit in the next 30 days with authorizing provider.  See \"Patient Info\" tab in inbasket, or \"Choose Columns\" in Meds & Orders section of the refill encounter.            Passed - No active pregnancy on record    If patient is pregnant or has had a positive pregnancy test, please check TSH.         Passed - No positive pregnancy test in past 12 months    If patient is pregnant or has had a positive pregnancy test, please check TSH.        Last Written Prescription Date:  4/24/17  Last Fill Quantity: 90,  # refills: 3   Last office visit: 1/11/2018 with prescribing provider:  1/11/18   Future Office Visit:      "

## 2018-02-13 NOTE — TELEPHONE ENCOUNTER
Routing refill request to provider for review/approval because:  Labs out of range:  See below    Amanda Carmona RN

## 2018-02-14 RX ORDER — LEVOTHYROXINE SODIUM 137 UG/1
TABLET ORAL
Qty: 30 TABLET | Refills: 0 | Status: SHIPPED | OUTPATIENT
Start: 2018-02-14 | End: 2018-02-20 | Stop reason: DRUGHIGH

## 2018-02-19 DIAGNOSIS — E03.9 HYPOTHYROIDISM, UNSPECIFIED TYPE: ICD-10-CM

## 2018-02-19 PROCEDURE — 36415 COLL VENOUS BLD VENIPUNCTURE: CPT | Performed by: FAMILY MEDICINE

## 2018-02-19 PROCEDURE — 84439 ASSAY OF FREE THYROXINE: CPT | Performed by: FAMILY MEDICINE

## 2018-02-19 PROCEDURE — 84443 ASSAY THYROID STIM HORMONE: CPT | Performed by: FAMILY MEDICINE

## 2018-02-20 LAB
T4 FREE SERPL-MCNC: 1.09 NG/DL (ref 0.76–1.46)
TSH SERPL DL<=0.005 MIU/L-ACNC: <0.01 MU/L (ref 0.4–4)

## 2018-02-20 RX ORDER — LEVOTHYROXINE SODIUM 125 UG/1
125 TABLET ORAL DAILY
Qty: 90 TABLET | Refills: 3 | Status: SHIPPED | OUTPATIENT
Start: 2018-02-20 | End: 2019-04-02

## 2018-04-17 DIAGNOSIS — M19.91 PRIMARY OSTEOARTHRITIS, UNSPECIFIED SITE: ICD-10-CM

## 2018-04-18 NOTE — TELEPHONE ENCOUNTER
"Requested Prescriptions   Pending Prescriptions Disp Refills     celecoxib (CELEBREX) 200 MG capsule [Pharmacy Med Name: CELECOXIB CAPS 200MG]  Last Written Prescription Date:  11/13/2017  Last Fill Quantity: 90,  # refills: 1   Last office visit: 1/11/2018 with prescribing provider:  [place]   Future Office Visit:     90 capsule 1     Sig: TAKE 1 CAPSULE DAILY    NSAID Medications Failed    4/17/2018 10:05 PM       Failed - Normal ALT on file in past 12 months    Recent Labs   Lab Test  06/29/15   1425   ALT  37            Failed - Normal AST on file in past 12 months    Recent Labs   Lab Test  06/29/15   1425   AST  26            Failed - Patient is age 6-64 years       Failed - Normal CBC on file in past 12 months    Recent Labs   Lab Test  01/29/15   0858   WBC  6.0   RBC  4.22   HGB  11.8   HCT  36.9   PLT  308            Passed - Blood pressure under 140/90 in past 12 months    BP Readings from Last 3 Encounters:   01/11/18 130/67   09/25/17 150/60   04/24/17 140/68                Passed - Recent (12 mo) or future (30 days) visit within the authorizing provider's specialty    Patient had office visit in the last 12 months or has a visit in the next 30 days with authorizing provider or within the authorizing provider's specialty.  See \"Patient Info\" tab in inbasket, or \"Choose Columns\" in Meds & Orders section of the refill encounter.           Passed - No active pregnancy on record       Passed - Normal serum creatinine on file in past 12 months    Recent Labs   Lab Test  09/25/17   1126   CR  0.79            Passed - No positive pregnancy test in past 12 months        Kevon Dickens RT (R)    "

## 2018-04-19 RX ORDER — CELECOXIB 200 MG/1
CAPSULE ORAL
Qty: 90 CAPSULE | Refills: 1 | Status: SHIPPED | OUTPATIENT
Start: 2018-04-19 | End: 2018-10-15

## 2018-04-20 DIAGNOSIS — E66.09 NON MORBID OBESITY DUE TO EXCESS CALORIES: ICD-10-CM

## 2018-04-20 RX ORDER — TOPIRAMATE 50 MG/1
TABLET, FILM COATED ORAL
Qty: 180 TABLET | Refills: 1 | Status: SHIPPED | OUTPATIENT
Start: 2018-04-20 | End: 2018-09-03

## 2018-04-20 NOTE — TELEPHONE ENCOUNTER
"Requested Prescriptions   Pending Prescriptions Disp Refills     topiramate (TOPAMAX) 50 MG tablet [Pharmacy Med Name: TOPIRAMATE TABS 50MG]  Last Written Prescription Date:  12/06/17  Last Fill Quantity: 180,  # refills: 1   Last office visit: 1/11/2018 with prescribing provider:  01/11/18   Future Office Visit:     180 tablet 1     Sig: TAKE 1 TABLET TWICE A DAY    Anti-Seizure Meds Protocol  Failed    4/20/2018  8:12 AM       Failed - Review Authorizing provider's last note.     Refer to last progress notes: confirm request is for original authorizing provider (cannot be through other providers).         Failed - Normal CBC on file in past 26 months    Recent Labs   Lab Test  01/29/15   0858   WBC  6.0   RBC  4.22   HGB  11.8   HCT  36.9   PLT  308          Failed - Normal ALT or AST on file in past 26 months    Recent Labs   Lab Test  06/29/15   1425   ALT  37     Recent Labs   Lab Test  06/29/15   1425   AST  26          Failed - Normal platelet count on file in past 26 months    Recent Labs   Lab Test  01/29/15   0858   PLT  308          Passed - Recent (12 mo) or future (30 days) visit within the authorizing provider's specialty    Patient had office visit in the last 12 months or has a visit in the next 30 days with authorizing provider or within the authorizing provider's specialty.  See \"Patient Info\" tab in inbasket, or \"Choose Columns\" in Meds & Orders section of the refill encounter.           Passed - Normal serum creatinine on file in past 26 months    Recent Labs   Lab Test  09/25/17   1126   CR  0.79          Passed - No active pregnancy on record       Passed - No positive pregnancy test in last 12 months          "

## 2018-05-19 DIAGNOSIS — M79.10 MYALGIA: ICD-10-CM

## 2018-05-21 RX ORDER — IBUPROFEN 800 MG/1
TABLET ORAL
Qty: 90 TABLET | Refills: 1 | Status: SHIPPED | OUTPATIENT
Start: 2018-05-21 | End: 2018-09-03

## 2018-10-15 ENCOUNTER — OFFICE VISIT (OUTPATIENT)
Dept: FAMILY MEDICINE | Facility: CLINIC | Age: 73
End: 2018-10-15
Payer: COMMERCIAL

## 2018-10-15 VITALS
RESPIRATION RATE: 18 BRPM | WEIGHT: 205 LBS | OXYGEN SATURATION: 99 % | BODY MASS INDEX: 35 KG/M2 | HEIGHT: 64 IN | HEART RATE: 57 BPM | DIASTOLIC BLOOD PRESSURE: 60 MMHG | SYSTOLIC BLOOD PRESSURE: 154 MMHG | TEMPERATURE: 98.2 F

## 2018-10-15 DIAGNOSIS — M19.91 PRIMARY OSTEOARTHRITIS, UNSPECIFIED SITE: ICD-10-CM

## 2018-10-15 DIAGNOSIS — E03.9 HYPOTHYROIDISM, UNSPECIFIED TYPE: ICD-10-CM

## 2018-10-15 DIAGNOSIS — Z23 NEED FOR PROPHYLACTIC VACCINATION AND INOCULATION AGAINST INFLUENZA: ICD-10-CM

## 2018-10-15 DIAGNOSIS — R03.0 ELEVATED BLOOD PRESSURE READING WITHOUT DIAGNOSIS OF HYPERTENSION: ICD-10-CM

## 2018-10-15 DIAGNOSIS — E66.01 MORBID OBESITY (H): ICD-10-CM

## 2018-10-15 DIAGNOSIS — M53.3 SACROILIAC JOINT PAIN: Primary | ICD-10-CM

## 2018-10-15 LAB
ANION GAP SERPL CALCULATED.3IONS-SCNC: 4 MMOL/L (ref 3–14)
BUN SERPL-MCNC: 13 MG/DL (ref 7–30)
CALCIUM SERPL-MCNC: 9.2 MG/DL (ref 8.5–10.1)
CHLORIDE SERPL-SCNC: 111 MMOL/L (ref 94–109)
CO2 SERPL-SCNC: 24 MMOL/L (ref 20–32)
CREAT SERPL-MCNC: 0.72 MG/DL (ref 0.52–1.04)
GFR SERPL CREATININE-BSD FRML MDRD: 80 ML/MIN/1.7M2
GLUCOSE SERPL-MCNC: 85 MG/DL (ref 70–99)
POTASSIUM SERPL-SCNC: 4.3 MMOL/L (ref 3.4–5.3)
SODIUM SERPL-SCNC: 139 MMOL/L (ref 133–144)
TSH SERPL DL<=0.005 MIU/L-ACNC: 0.99 MU/L (ref 0.4–4)

## 2018-10-15 PROCEDURE — 90662 IIV NO PRSV INCREASED AG IM: CPT | Performed by: FAMILY MEDICINE

## 2018-10-15 PROCEDURE — 84443 ASSAY THYROID STIM HORMONE: CPT | Performed by: FAMILY MEDICINE

## 2018-10-15 PROCEDURE — 80048 BASIC METABOLIC PNL TOTAL CA: CPT | Performed by: FAMILY MEDICINE

## 2018-10-15 PROCEDURE — 36415 COLL VENOUS BLD VENIPUNCTURE: CPT | Performed by: FAMILY MEDICINE

## 2018-10-15 PROCEDURE — G0008 ADMIN INFLUENZA VIRUS VAC: HCPCS | Performed by: FAMILY MEDICINE

## 2018-10-15 PROCEDURE — 99214 OFFICE O/P EST MOD 30 MIN: CPT | Mod: 25 | Performed by: FAMILY MEDICINE

## 2018-10-15 RX ORDER — CELECOXIB 200 MG/1
200 CAPSULE ORAL DAILY
Qty: 90 CAPSULE | Refills: 1 | Status: SHIPPED | OUTPATIENT
Start: 2018-10-15 | End: 2019-04-22

## 2018-10-15 ASSESSMENT — PAIN SCALES - GENERAL: PAINLEVEL: SEVERE PAIN (7)

## 2018-10-15 NOTE — PATIENT INSTRUCTIONS
Tylenol - you can take about 3000 mg daily     You can continue celebrex  Do not take both celebrex and ibuprofen    Physical therapy/exercise for your back.   Stretches throughout the day - keep moving your joints    Recheck blood pressure - we cannot do phentermine if blood pressure is >140/90      Tomasa Rios M.D.          Thank you for choosing [facility].  You may be receiving a survey in the mail from [employer] regarding your visit today.  Please take a few minutes to complete and return the survey to let us know how we are doing.      Our Clinic hours are:  Mondays    7:20 am - 7 pm  Tues -  Fri  7:20 am - 5 pm    Clinic Phone: [phone]    The clinic lab opens at 7:30 am Mon - Fri and appointments are required.    [facility]. [phone]  Monday  8 am - 7pm  Tues - Fri 8 am - 5:30 pm

## 2018-10-15 NOTE — MR AVS SNAPSHOT
After Visit Summary   10/15/2018    Marilee Marks    MRN: [id number]           Patient Information     Date Of Birth          1945        Visit Information        Provider Department      10/15/2018 12:40 PM Tomasa Rios MD [facility]        Today's Diagnoses     Sacroiliac joint pain    -  1    Need for prophylactic vaccination and inoculation against influenza        Primary osteoarthritis, unspecified site        Morbid obesity (H)        Elevated blood pressure reading without diagnosis of hypertension        Hypothyroidism, unspecified type          Care Instructions      Tylenol - you can take about 3000 mg daily     You can continue celebrex  Do not take both celebrex and ibuprofen    Physical therapy/exercise for your back.   Stretches throughout the day - keep moving your joints    Recheck blood pressure - we cannot do phentermine if blood pressure is >140/90      Tomasa Rios M.D.          Thank you for choosing [facility].  You may be receiving a survey in the mail from [employer] regarding your visit today.  Please take a few minutes to complete and return the survey to let us know how we are doing.      Our Clinic hours are:  Mondays    7:20 am - 7 pm  Tues -  Fri  7:20 am - 5 pm    Clinic Phone: [phone]    The clinic lab opens at 7:30 am Mon - Fri and appointments are required.    [facility]  Ph. [phone]  Monday  8 am - 7pm  Tues - Fri 8 am - 5:30 pm                 Follow-ups after your visit        Who to contact     If you have questions or need follow up information about today's clinic visit or your schedule please contact [facility] directly at [phone].  Normal or non-critical lab and imaging results will be communicated to you by MyChart, letter or phone within 4 business days after the clinic has received the results. If you do not hear from us within 7 days, please contact the  "clinic through [employer] or phone. If you have a critical or abnormal lab result, we will notify you by phone as soon as possible.  Submit refill requests through [employer] or call your pharmacy and they will forward the refill request to us. Please allow 3 business days for your refill to be completed.          Additional Information About Your Visit        [employer] Information     [employer] gives you secure access to your electronic health record. If you see a primary care provider, you can also send messages to your care team and make appointments. If you have questions, please call your primary care clinic.  If you do not have a primary care provider, please call [phone] and they will assist you.        Care EveryWhere ID     This is your Care EveryWhere ID. This could be used by other organizations to access your [place] medical records  [id number]        Your Vitals Were     Pulse Temperature Respirations Height Pulse Oximetry Breastfeeding?    57 98.2  F (36.8  C) (Tympanic) 18 5' 4\" (1.626 m) 99% No    BMI (Body Mass Index)                   35.19 kg/m2            Blood Pressure from Last 3 Encounters:   10/15/18 154/60   01/11/18 130/67   09/25/17 150/60    Weight from Last 3 Encounters:   10/15/18 205 lb (93 kg)   01/11/18 186 lb (84.4 kg)   09/25/17 194 lb (88 kg)              We Performed the Following     Basic metabolic panel     FLU VACCINE, INCREASED ANTIGEN, PRESV FREE, AGE 65+ [11320]     TSH with free T4 reflex     Vaccine Administration, Initial [90762]          Today's Medication Changes          These changes are accurate as of 10/15/18  1:16 PM.  If you have any questions, ask your nurse or doctor.               These medicines have changed or have updated prescriptions.        Dose/Directions    celecoxib 200 MG capsule   Commonly known as:  celeBREX   This may have changed:  See the new instructions.   Used for:  Primary osteoarthritis, unspecified site   Changed by:  Tomasa Rios MD    "     Dose:  200 mg   Take 1 capsule (200 mg) by mouth daily   Quantity:  90 capsule   Refills:  1         Stop taking these medicines if you haven't already. Please contact your care team if you have questions.      MG tablet   Generic drug:  ibuprofen   Stopped by:  Tomasa Rios MD                Where to get your medicines      These medications were sent to [facility] [place], [place] - [address]  [address] [address]     Phone:  [phone]     celecoxib 200 MG capsule                Primary Care Provider Office Phone # Fax #    Tomasa Rois [phone] [address] [address]        Equal Access to Services     [facility]: Hadii lexa hampton hadasho Socherri, waaxda luqadaha, qaybta kaalmada adetabathayada, angel metcalf . So [facility] [phone].    ATENCIÓN: Si habla español, tiene a palmer disposición servicios gratuitos de asistencia lingüística. Llame al [phone].    We comply with applicable federal civil rights laws and [place] laws. We do not discriminate on the basis of race, color, national origin, age, disability, sex, sexual orientation, or gender identity.            Thank you!     Thank you for choosing [facility]  for your care. Our goal is always to provide you with excellent care. Hearing back from our patients is one way we can continue to improve our services. Please take a few minutes to complete the written survey that you may receive in the mail after your visit with us. Thank you!             Your Updated Medication List - Protect others around you: Learn how to safely use, store and throw away your medicines at www.disposemymeds.org.          This list is accurate as of 10/15/18  1:16 PM.  Always use your most recent med list.                   Brand Name Dispense Instructions for use Diagnosis    CALTRATE 600+D 600-400 MG-UNIT per tablet    Generic drug:  calcium carbonate 600 mg-vitamin D 400 units      Take one tablet by mouth every day        celecoxib 200 MG capsule    celeBREX    90 capsule    Take 1 capsule (200 mg) by mouth daily    Primary osteoarthritis, unspecified site       CENTRUM SILVER per tablet      Take one tablet by mouth every day    Preop general physical exam, Hypothyroidism, Gallbladder & bile duct stone with obstruction, Obesity       levothyroxine 125 MCG tablet    SYNTHROID/LEVOTHROID    90 tablet    Take 1 tablet (125 mcg) by mouth daily    Hypothyroidism, unspecified type       topiramate 50 MG tablet    TOPAMAX    180 tablet    TAKE 1 TABLET TWICE A DAY    Non morbid obesity due to excess calories       vitamin D3 1000 units Caps      5000mg tablet,  1tab daily

## 2018-10-15 NOTE — PROGRESS NOTES
"  SUBJECTIVE:   Marilee Marks is a 72 year old female who presents to clinic today for the following health issues:      Back Pain       Duration: ongoing started in shoulders and now is in low back - a few months.          Specific cause: arthritis    Description:   Location of pain: low back bilateral and shoulders bilateral  Character of pain: dull ache  Pain radiation:none  New numbness or weakness in legs, not attributed to pain:  no     Intensity: Currently 7/10    History:   Pain interferes with job: YES,   History of back problems: no prior back problems  Any previous MRI or X-rays: None  Sees a specialist for back pain:  No  Therapies tried without relief:     Alleviating factors:   Improved by: hot bath and ibu      Precipitating factors:  Worsened by: Standing          Accompanying Signs & Symptoms:  Risk of Fracture:  Age >64  Risk of Cauda Equina:  None  Risk of Infection:  None  Risk of Cancer:  None  Risk of Ankylosing Spondylitis:  Onset at age <35, male, AND morning back stiffness. no          /60  Pulse 57  Temp 98.2  F (36.8  C) (Tympanic)  Resp 18  Ht 5' 4\" (1.626 m)  Wt 205 lb (93 kg)  SpO2 99%  Breastfeeding? No  BMI 35.19 kg/m2  EXAM: GENERAL APPEARANCE: Alert, no acute distress  RESP: lungs clear to auscultation   CV: normal rate, regular rhythm, no murmur or gallop  ABDOMEN: soft, no organomegaly, masses or tenderness  MS: back exam: normal posture, shoulders, inferior scapular borders and hips even and symmetrical, moves about the exam room comfortably, full ROM, tenderness to palpitation bilateral SI joint tenderness, straight leg raise right neg, straight leg raise left negative    ASSESSMENT/PLAN:      ICD-10-CM    1. Sacroiliac joint pain M53.3    2. Need for prophylactic vaccination and inoculation against influenza Z23 FLU VACCINE, INCREASED ANTIGEN, PRESV FREE, AGE 65+ [46611]     Vaccine Administration, Initial [87643]   3. Primary osteoarthritis, unspecified " site M19.91 celecoxib (CELEBREX) 200 MG capsule   4. Morbid obesity (H) E66.01    5. Elevated blood pressure reading without diagnosis of hypertension R03.0 Basic metabolic panel   6. Hypothyroidism, unspecified type E03.9 TSH with free T4 reflex     Needs to recheck blood pressure in a week or two.  We cannot do phentermine if >140/90- so she will recheck.    She is interested in restarting phentermine for weight loss/appetite suppression.     Patient Instructions     Tylenol - you can take about 3000 mg daily     You can continue celebrex  Do not take both celebrex and ibuprofen    Physical therapy/exercise for your back.   Stretches throughout the day - keep moving your joints    Tomasa Rios M.D.      Thank you for choosing [facility].  You may be receiving a survey in the mail from [employer] [facility] regarding your visit today.  Please take a few minutes to complete and return the survey to let us know how we are doing.      Our Clinic hours are:  Mondays    7:20 am - 7 pm  Tues -  Fri  7:20 am - 5 pm    Clinic Phone: [phone]    The clinic lab opens at 7:30 am Mon - Fri and appointments are required.    [facility] [place]  Ph. [phone]  Monday  8 am - 7pm  Tues - Fri 8 am - 5:30 pm                         Injectable Influenza Immunization Documentation    1.  Is the person to be vaccinated sick today?   No    2. Does the person to be vaccinated have an allergy to a component   of the vaccine?   No  Egg Allergy Algorithm Link    3. Has the person to be vaccinated ever had a serious reaction   to influenza vaccine in the past?   No    4. Has the person to be vaccinated ever had Guillain-Barré syndrome?   No    Form completed by Sigrid Glover MA

## 2018-10-17 NOTE — PROGRESS NOTES
Marilee,    All of the labs were normal or acceptable.    Please contact my office if you have questions.    Tomasa Rios M.D.

## 2018-11-27 ENCOUNTER — TELEPHONE (OUTPATIENT)
Dept: FAMILY MEDICINE | Facility: CLINIC | Age: 73
End: 2018-11-27

## 2019-01-07 ENCOUNTER — ANCILLARY PROCEDURE (OUTPATIENT)
Dept: MAMMOGRAPHY | Facility: CLINIC | Age: 74
End: 2019-01-07
Payer: COMMERCIAL

## 2019-01-07 DIAGNOSIS — Z12.31 VISIT FOR SCREENING MAMMOGRAM: ICD-10-CM

## 2019-01-07 PROCEDURE — 77067 SCR MAMMO BI INCL CAD: CPT | Mod: TC

## 2019-01-10 ENCOUNTER — MYC MEDICAL ADVICE (OUTPATIENT)
Dept: FAMILY MEDICINE | Facility: CLINIC | Age: 74
End: 2019-01-10

## 2019-01-10 ENCOUNTER — HOSPITAL ENCOUNTER (OUTPATIENT)
Dept: MAMMOGRAPHY | Facility: CLINIC | Age: 74
Discharge: HOME | End: 2019-01-10
Attending: FAMILY MEDICINE | Admitting: FAMILY MEDICINE
Payer: COMMERCIAL

## 2019-01-10 DIAGNOSIS — R92.8 ABNORMAL MAMMOGRAM: ICD-10-CM

## 2019-01-10 PROCEDURE — G0279 TOMOSYNTHESIS, MAMMO: HCPCS

## 2019-02-11 ENCOUNTER — MYC MEDICAL ADVICE (OUTPATIENT)
Dept: FAMILY MEDICINE | Facility: CLINIC | Age: 74
End: 2019-02-11

## 2019-02-11 DIAGNOSIS — E66.09 NON MORBID OBESITY DUE TO EXCESS CALORIES: ICD-10-CM

## 2019-02-11 NOTE — TELEPHONE ENCOUNTER
Dr. Rios,    Patient giving you an FYI that her total knee replacement is getting worse and she is f/u with Dr. Deras.    Thanks,  Reema RAMIREZ RN

## 2019-02-12 NOTE — TELEPHONE ENCOUNTER
"TOPIRAMATE TABS 50MG  Last Written Prescription Date:  9/5/2018  Last Fill Quantity: 180,  # refills: 1   Last office visit: 10/15/2018 with prescribing provider:  ANTHONY   Future Office Visit:    Requested Prescriptions   Pending Prescriptions Disp Refills     topiramate (TOPAMAX) 50 MG tablet [Pharmacy Med Name: TOPIRAMATE TABS 50MG] 180 tablet 1     Sig: TAKE 1 TABLET TWICE A DAY    Anti-Seizure Meds Protocol  Failed - 2/11/2019 11:10 AM       Failed - Review Authorizing provider's last note.     Refer to last progress notes: confirm request is for original authorizing provider (cannot be through other providers).         Failed - Normal CBC on file in past 26 months    Recent Labs   Lab Test 01/29/15  0858   WBC 6.0   RBC 4.22   HGB 11.8   HCT 36.9                   Failed - Normal ALT or AST on file in past 26 months    Recent Labs   Lab Test 06/29/15  1425   ALT 37     Recent Labs   Lab Test 06/29/15  1425   AST 26            Failed - Normal platelet count on file in past 26 months    Recent Labs   Lab Test 01/29/15  0858                 Passed - Recent (12 mo) or future (30 days) visit within the authorizing provider's specialty    Patient had office visit in the last 12 months or has a visit in the next 30 days with authorizing provider or within the authorizing provider's specialty.  See \"Patient Info\" tab in inbasket, or \"Choose Columns\" in Meds & Orders section of the refill encounter.             Passed - Normal serum creatinine on file in past 26 months    Recent Labs   Lab Test 10/15/18  1315   CR 0.72            Passed - Medication is active on med list       Passed - No active pregnancy on record       Passed - No positive pregnancy test in last 12 months          "

## 2019-02-13 RX ORDER — TOPIRAMATE 50 MG/1
TABLET, FILM COATED ORAL
Qty: 180 TABLET | Refills: 1 | Status: SHIPPED | OUTPATIENT
Start: 2019-02-13 | End: 2019-07-27

## 2019-02-13 NOTE — TELEPHONE ENCOUNTER
Routing refill request to provider for review/approval because:  Labs not current:  CBC, ALT, AST  LOV 10/15/18 with PCP.   Pended labs.   Associated Diagnoses   Non morbid obesity due to excess calories [E66.09         ETTA MárquezN, RN

## 2019-02-20 ENCOUNTER — TRANSFERRED RECORDS (OUTPATIENT)
Dept: HEALTH INFORMATION MANAGEMENT | Facility: CLINIC | Age: 74
End: 2019-02-20

## 2019-02-25 ENCOUNTER — TRANSFERRED RECORDS (OUTPATIENT)
Dept: HEALTH INFORMATION MANAGEMENT | Facility: CLINIC | Age: 74
End: 2019-02-25

## 2019-03-21 ENCOUNTER — MYC MEDICAL ADVICE (OUTPATIENT)
Dept: FAMILY MEDICINE | Facility: CLINIC | Age: 74
End: 2019-03-21

## 2019-03-21 DIAGNOSIS — M19.91 PRIMARY OSTEOARTHRITIS, UNSPECIFIED SITE: ICD-10-CM

## 2019-03-21 NOTE — TELEPHONE ENCOUNTER
Dr. Rios,    Patient sends a my chart note and would like to increase her Celbrex.  Evisit? Rosemary BLISS RN

## 2019-04-02 DIAGNOSIS — E03.9 HYPOTHYROIDISM, UNSPECIFIED TYPE: ICD-10-CM

## 2019-04-02 RX ORDER — LEVOTHYROXINE SODIUM 125 UG/1
TABLET ORAL
Qty: 90 TABLET | Refills: 1 | Status: SHIPPED | OUTPATIENT
Start: 2019-04-02 | End: 2019-09-02

## 2019-04-02 NOTE — TELEPHONE ENCOUNTER
Prescription approved per [facility] Refill Protocol.    Tess BOYCE RN

## 2019-04-02 NOTE — TELEPHONE ENCOUNTER
"Requested Prescriptions   Pending Prescriptions Disp Refills     levothyroxine (SYNTHROID/LEVOTHROID) 125 MCG tablet [Pharmacy Med Name: L-THYROXINE (SYNTHROID) TABS 125MCG]  Last Written Prescription Date:  2/20/2018  Last Fill Quantity: 90,  # refills: 3   Last office visit: 10/15/2018 with prescribing provider:  Gabriel   Future Office Visit:     90 tablet 3     Sig: TAKE 1 TABLET DAILY    Thyroid Protocol Passed - 4/2/2019  9:21 AM       Passed - Patient is 12 years or older       Passed - Recent (12 mo) or future (30 days) visit within the authorizing provider's specialty    Patient had office visit in the last 12 months or has a visit in the next 30 days with authorizing provider or within the authorizing provider's specialty.  See \"Patient Info\" tab in inbasket, or \"Choose Columns\" in Meds & Orders section of the refill encounter.             Passed - Medication is active on med list       Passed - Normal TSH on file in past 12 months    Recent Labs   Lab Test 10/15/18  1315   TSH 0.99             Passed - No active pregnancy on record    If patient is pregnant or has had a positive pregnancy test, please check TSH.         Passed - No positive pregnancy test in past 12 months    If patient is pregnant or has had a positive pregnancy test, please check TSH.            "

## 2019-04-22 DIAGNOSIS — M19.91 PRIMARY OSTEOARTHRITIS, UNSPECIFIED SITE: ICD-10-CM

## 2019-04-23 NOTE — TELEPHONE ENCOUNTER
Routing refill request to provider for review/approval because:  Failed - Patient is age 6-64 years         Patient is 73 y.o  Labs not current:  ALT, AST, CBC  LOV 10/15/18 with PCP. Pended 6 month supply     ETTA MárquezN, RN

## 2019-04-23 NOTE — TELEPHONE ENCOUNTER
"Requested Prescriptions   Pending Prescriptions Disp Refills     celecoxib (CELEBREX) 200 MG capsule [Pharmacy Med Name: CELECOXIB CAPS 200MG] 90 capsule 1     Sig: TAKE 1 CAPSULE DAILY  Last Written Prescription Date:  10/15/2018  Last Fill Quantity: 90,  # refills: 1   Last office visit: 10/15/2018 with prescribing provider:  Gabriel   Future Office Visit:           NSAID Medications Failed - 4/22/2019  6:59 AM        Failed - Blood pressure under 140/90 in past 12 months     BP Readings from Last 3 Encounters:   10/15/18 154/60   01/11/18 130/67   09/25/17 150/60                 Failed - Normal ALT on file in past 12 months     Recent Labs   Lab Test 06/29/15  1425   ALT 37             Failed - Normal AST on file in past 12 months     Recent Labs   Lab Test 06/29/15  1425   AST 26             Failed - Patient is age 6-64 years        Failed - Normal CBC on file in past 12 months     Recent Labs   Lab Test 01/29/15  0858   WBC 6.0   RBC 4.22   HGB 11.8   HCT 36.9                    Passed - Recent (12 mo) or future (30 days) visit within the authorizing provider's specialty     Patient had office visit in the last 12 months or has a visit in the next 30 days with authorizing provider or within the authorizing provider's specialty.  See \"Patient Info\" tab in inbasket, or \"Choose Columns\" in Meds & Orders section of the refill encounter.              Passed - Medication is active on med list        Passed - No active pregnancy on record        Passed - Normal serum creatinine on file in past 12 months     Recent Labs   Lab Test 10/15/18  1315   CR 0.72             Passed - No positive pregnancy test in past 12 months          "

## 2019-04-24 RX ORDER — CELECOXIB 200 MG/1
CAPSULE ORAL
Qty: 90 CAPSULE | Refills: 1 | Status: SHIPPED | OUTPATIENT
Start: 2019-04-24 | End: 2019-10-04

## 2019-07-27 DIAGNOSIS — E66.09 NON MORBID OBESITY DUE TO EXCESS CALORIES: ICD-10-CM

## 2019-07-29 NOTE — TELEPHONE ENCOUNTER
"Requested Prescriptions   Pending Prescriptions Disp Refills     topiramate (TOPAMAX) 50 MG tablet [Pharmacy Med Name: TOPIRAMATE TABS 50MG] 180 tablet 1     Sig: TAKE 1 TABLET TWICE A DAY  Last Written Prescription Date:  2/13/2019  Last Fill Quantity: 180,  # refills: 1   Last office visit: 10/15/2018 with prescribing provider:  Gabriel    Future Office Visit:           Anti-Seizure Meds Protocol  Failed - 7/27/2019  8:41 AM        Failed - Review Authorizing provider's last note.      Refer to last progress notes: confirm request is for original authorizing provider (cannot be through other providers).          Failed - Normal CBC on file in past 26 months     Recent Labs   Lab Test 01/29/15  0858   WBC 6.0   RBC 4.22   HGB 11.8   HCT 36.9                    Failed - Normal ALT or AST on file in past 26 months     Recent Labs   Lab Test 06/29/15  1425   ALT 37     Recent Labs   Lab Test 06/29/15  1425   AST 26             Failed - Normal platelet count on file in past 26 months     Recent Labs   Lab Test 01/29/15  0858                  Passed - Recent (12 mo) or future (30 days) visit within the authorizing provider's specialty     Patient had office visit in the last 12 months or has a visit in the next 30 days with authorizing provider or within the authorizing provider's specialty.  See \"Patient Info\" tab in inbasket, or \"Choose Columns\" in Meds & Orders section of the refill encounter.              Passed - Normal serum creatinine on file in past 26 months     Recent Labs   Lab Test 10/15/18  1315   CR 0.72             Passed - Medication is active on med list        Passed - No active pregnancy on record        Passed - No positive pregnancy test in last 12 months          "

## 2019-07-30 NOTE — TELEPHONE ENCOUNTER
Routing refill request to provider for review/approval because:  DX not on protocol: Associated Diagnoses   Non morbid obesity due to excess calories [E66.09]         Due for CBC, ALT/AST, PLT    LOV 10/15/18. 90 day pended with note to pharmacy.      Karen RODRÍGUEZ BSN, RN

## 2019-07-31 RX ORDER — TOPIRAMATE 50 MG/1
TABLET, FILM COATED ORAL
Qty: 180 TABLET | Refills: 0 | Status: SHIPPED | OUTPATIENT
Start: 2019-07-31 | End: 2019-10-04

## 2019-08-12 ENCOUNTER — TRANSFERRED RECORDS (OUTPATIENT)
Dept: HEALTH INFORMATION MANAGEMENT | Facility: CLINIC | Age: 74
End: 2019-08-12

## 2019-09-02 DIAGNOSIS — E03.9 HYPOTHYROIDISM, UNSPECIFIED TYPE: ICD-10-CM

## 2019-09-02 NOTE — LETTER
September 4, 2019      Marilee Janeen Marks  [address]  [place] [address]        Dear Marilee,     We received a refill request for your levothyroxine medication.  This medication has been refilled for 30 days as you are due for an office visit for further refills.  Please call [phone] to schedule an appointment.        Sincerely,        Dr. Tomasa Rios's Care Team              jorge

## 2019-09-03 NOTE — TELEPHONE ENCOUNTER
"Requested Prescriptions   Pending Prescriptions Disp Refills     levothyroxine (SYNTHROID/LEVOTHROID) 125 MCG tablet [Pharmacy Med Name: L-THYROXINE (SYNTHROID) TABS 125MCG] 90 tablet 4     Sig: TAKE 1 TABLET DAILY  Last Written Prescription Date:  4/2/2019  Last Fill Quantity: 90,  # refills: 1   Last office visit: 10/15/2018 with prescribing provider:  Gabriel    Future Office Visit:           Thyroid Protocol Passed - 9/2/2019  3:43 PM        Passed - Patient is 12 years or older        Passed - Recent (12 mo) or future (30 days) visit within the authorizing provider's specialty     Patient had office visit in the last 12 months or has a visit in the next 30 days with authorizing provider or within the authorizing provider's specialty.  See \"Patient Info\" tab in inbasket, or \"Choose Columns\" in Meds & Orders section of the refill encounter.              Passed - Medication is active on med list        Passed - Normal TSH on file in past 12 months     Recent Labs   Lab Test 10/15/18  1315   TSH 0.99              Passed - No active pregnancy on record     If patient is pregnant or has had a positive pregnancy test, please check TSH.          Passed - No positive pregnancy test in past 12 months     If patient is pregnant or has had a positive pregnancy test, please check TSH.            "

## 2019-09-04 RX ORDER — LEVOTHYROXINE SODIUM 125 UG/1
TABLET ORAL
Qty: 90 TABLET | Refills: 0 | Status: SHIPPED | OUTPATIENT
Start: 2019-09-04 | End: 2019-10-04

## 2019-09-04 NOTE — TELEPHONE ENCOUNTER
Medication is being filled for 1 time refill only due to:  Patient needs to be seen because due for appt next month..   Letter sent.  Nery BASILIO RN

## 2019-10-01 ASSESSMENT — ENCOUNTER SYMPTOMS: BREAST MASS: 0

## 2019-10-01 ASSESSMENT — ACTIVITIES OF DAILY LIVING (ADL): CURRENT_FUNCTION: NO ASSISTANCE NEEDED

## 2019-10-03 DIAGNOSIS — M19.91 PRIMARY OSTEOARTHRITIS, UNSPECIFIED SITE: ICD-10-CM

## 2019-10-03 RX ORDER — CELECOXIB 200 MG/1
CAPSULE ORAL
Qty: 90 CAPSULE | Refills: 4 | OUTPATIENT
Start: 2019-10-03

## 2019-10-03 NOTE — TELEPHONE ENCOUNTER
Last OV 10/15/18 with Dr. Rios - scheduled for appt tomorrow 10/4/19; can be addressed then.    Tess BOYCE RN

## 2019-10-03 NOTE — TELEPHONE ENCOUNTER
"Requested Prescriptions   Pending Prescriptions Disp Refills     celecoxib (CELEBREX) 200 MG capsule [Pharmacy Med Name: CELECOXIB CAPS 200MG] 90 capsule 4     Sig: TAKE 1 CAPSULE DAILY       NSAID Medications Failed - 10/3/2019  7:29 AM        Failed - Blood pressure under 140/90 in past 12 months     BP Readings from Last 3 Encounters:   10/15/18 154/60   01/11/18 130/67   09/25/17 150/60                 Failed - Normal ALT on file in past 12 months     Recent Labs   Lab Test 06/29/15  1425   ALT 37             Failed - Normal AST on file in past 12 months     Recent Labs   Lab Test 06/29/15  1425   AST 26             Failed - Patient is age 6-64 years        Failed - Normal CBC on file in past 12 months     Recent Labs   Lab Test 01/29/15  0858   WBC 6.0   RBC 4.22   HGB 11.8   HCT 36.9                    Passed - Recent (12 mo) or future (30 days) visit within the authorizing provider's specialty     Patient has had an office visit with the authorizing provider or a provider within the authorizing providers department within the previous 12 mos or has a future within next 30 days. See \"Patient Info\" tab in inbasket, or \"Choose Columns\" in Meds & Orders section of the refill encounter.              Passed - Medication is active on med list        Passed - No active pregnancy on record        Passed - Normal serum creatinine on file in past 12 months     Recent Labs   Lab Test 10/15/18  1315   CR 0.72             Passed - No positive pregnancy test in past 12 months        Last Written Prescription Date:  4/24/2019  Last Fill Quantity: 90,  # refills: 1   Last office visit: 10/15/2018 with prescribing provider:  Gabriel   Future Office Visit:   Next 5 appointments (look out 90 days)    Oct 04, 2019  9:00 AM CDT  PHYSICAL with Tomasa Rios MD  [facility] ([facility]) [address] [facility] [address] [phone]           "

## 2019-10-04 ENCOUNTER — OFFICE VISIT (OUTPATIENT)
Dept: FAMILY MEDICINE | Facility: CLINIC | Age: 74
End: 2019-10-04
Payer: COMMERCIAL

## 2019-10-04 VITALS
HEIGHT: 64 IN | TEMPERATURE: 97.8 F | OXYGEN SATURATION: 99 % | RESPIRATION RATE: 18 BRPM | WEIGHT: 204 LBS | BODY MASS INDEX: 34.83 KG/M2 | SYSTOLIC BLOOD PRESSURE: 142 MMHG | HEART RATE: 66 BPM | DIASTOLIC BLOOD PRESSURE: 72 MMHG

## 2019-10-04 DIAGNOSIS — Z23 NEED FOR PROPHYLACTIC VACCINATION AND INOCULATION AGAINST INFLUENZA: ICD-10-CM

## 2019-10-04 DIAGNOSIS — M19.91 PRIMARY OSTEOARTHRITIS, UNSPECIFIED SITE: ICD-10-CM

## 2019-10-04 DIAGNOSIS — Z00.00 MEDICARE ANNUAL WELLNESS VISIT, SUBSEQUENT: Primary | ICD-10-CM

## 2019-10-04 DIAGNOSIS — I10 ESSENTIAL HYPERTENSION: ICD-10-CM

## 2019-10-04 DIAGNOSIS — R53.83 FATIGUE, UNSPECIFIED TYPE: ICD-10-CM

## 2019-10-04 DIAGNOSIS — E03.9 HYPOTHYROIDISM, UNSPECIFIED TYPE: ICD-10-CM

## 2019-10-04 DIAGNOSIS — R25.2 LEG CRAMPS: ICD-10-CM

## 2019-10-04 LAB
ALBUMIN SERPL-MCNC: 3.7 G/DL (ref 3.4–5)
ALP SERPL-CCNC: 108 U/L (ref 40–150)
ALT SERPL W P-5'-P-CCNC: 47 U/L (ref 0–50)
ANION GAP SERPL CALCULATED.3IONS-SCNC: 5 MMOL/L (ref 3–14)
AST SERPL W P-5'-P-CCNC: 30 U/L (ref 0–45)
BASOPHILS # BLD AUTO: 0 10E9/L (ref 0–0.2)
BASOPHILS NFR BLD AUTO: 0.3 %
BILIRUB SERPL-MCNC: 0.6 MG/DL (ref 0.2–1.3)
BUN SERPL-MCNC: 15 MG/DL (ref 7–30)
CALCIUM SERPL-MCNC: 10.1 MG/DL (ref 8.5–10.1)
CHLORIDE SERPL-SCNC: 111 MMOL/L (ref 94–109)
CO2 SERPL-SCNC: 23 MMOL/L (ref 20–32)
CREAT SERPL-MCNC: 0.82 MG/DL (ref 0.52–1.04)
DIFFERENTIAL METHOD BLD: NORMAL
EOSINOPHIL # BLD AUTO: 0.1 10E9/L (ref 0–0.7)
EOSINOPHIL NFR BLD AUTO: 3.3 %
ERYTHROCYTE [DISTWIDTH] IN BLOOD BY AUTOMATED COUNT: 12.9 % (ref 10–15)
GFR SERPL CREATININE-BSD FRML MDRD: 70 ML/MIN/{1.73_M2}
GLUCOSE SERPL-MCNC: 83 MG/DL (ref 70–99)
HCT VFR BLD AUTO: 39.2 % (ref 35–47)
HGB BLD-MCNC: 12.5 G/DL (ref 11.7–15.7)
LYMPHOCYTES # BLD AUTO: 1.3 10E9/L (ref 0.8–5.3)
LYMPHOCYTES NFR BLD AUTO: 31.8 %
MAGNESIUM SERPL-MCNC: 2.3 MG/DL (ref 1.6–2.3)
MCH RBC QN AUTO: 30.4 PG (ref 26.5–33)
MCHC RBC AUTO-ENTMCNC: 31.9 G/DL (ref 31.5–36.5)
MCV RBC AUTO: 95 FL (ref 78–100)
MONOCYTES # BLD AUTO: 0.5 10E9/L (ref 0–1.3)
MONOCYTES NFR BLD AUTO: 13 %
NEUTROPHILS # BLD AUTO: 2.1 10E9/L (ref 1.6–8.3)
NEUTROPHILS NFR BLD AUTO: 51.6 %
PLATELET # BLD AUTO: 238 10E9/L (ref 150–450)
POTASSIUM SERPL-SCNC: 4.7 MMOL/L (ref 3.4–5.3)
PROT SERPL-MCNC: 6.7 G/DL (ref 6.8–8.8)
RBC # BLD AUTO: 4.11 10E12/L (ref 3.8–5.2)
SODIUM SERPL-SCNC: 139 MMOL/L (ref 133–144)
T4 FREE SERPL-MCNC: 0.67 NG/DL (ref 0.76–1.46)
TSH SERPL DL<=0.005 MIU/L-ACNC: 4.75 MU/L (ref 0.4–4)
WBC # BLD AUTO: 4 10E9/L (ref 4–11)

## 2019-10-04 PROCEDURE — G0008 ADMIN INFLUENZA VIRUS VAC: HCPCS | Performed by: FAMILY MEDICINE

## 2019-10-04 PROCEDURE — 85025 COMPLETE CBC W/AUTO DIFF WBC: CPT | Performed by: FAMILY MEDICINE

## 2019-10-04 PROCEDURE — 36415 COLL VENOUS BLD VENIPUNCTURE: CPT | Performed by: FAMILY MEDICINE

## 2019-10-04 PROCEDURE — 99214 OFFICE O/P EST MOD 30 MIN: CPT | Mod: 25 | Performed by: FAMILY MEDICINE

## 2019-10-04 PROCEDURE — 90662 IIV NO PRSV INCREASED AG IM: CPT | Performed by: FAMILY MEDICINE

## 2019-10-04 PROCEDURE — 83735 ASSAY OF MAGNESIUM: CPT | Performed by: FAMILY MEDICINE

## 2019-10-04 PROCEDURE — 99397 PER PM REEVAL EST PAT 65+ YR: CPT | Mod: 25 | Performed by: FAMILY MEDICINE

## 2019-10-04 PROCEDURE — 84443 ASSAY THYROID STIM HORMONE: CPT | Performed by: FAMILY MEDICINE

## 2019-10-04 PROCEDURE — 84439 ASSAY OF FREE THYROXINE: CPT | Performed by: FAMILY MEDICINE

## 2019-10-04 PROCEDURE — 80053 COMPREHEN METABOLIC PANEL: CPT | Performed by: FAMILY MEDICINE

## 2019-10-04 RX ORDER — HYDROCHLOROTHIAZIDE 12.5 MG/1
25 TABLET ORAL DAILY
Qty: 30 TABLET | Refills: 0 | Status: SHIPPED | OUTPATIENT
Start: 2019-10-04 | End: 2019-10-04

## 2019-10-04 RX ORDER — CELECOXIB 200 MG/1
200 CAPSULE ORAL DAILY
Qty: 90 CAPSULE | Refills: 3 | Status: SHIPPED | OUTPATIENT
Start: 2019-10-04 | End: 2020-09-23

## 2019-10-04 RX ORDER — LEVOTHYROXINE SODIUM 125 UG/1
125 TABLET ORAL DAILY
Qty: 90 TABLET | Refills: 3 | Status: SHIPPED | OUTPATIENT
Start: 2019-10-04 | End: 2019-10-04 | Stop reason: DRUGHIGH

## 2019-10-04 RX ORDER — HYDROCHLOROTHIAZIDE 12.5 MG/1
12.5 TABLET ORAL DAILY
Qty: 30 TABLET | Refills: 0 | Status: SHIPPED | OUTPATIENT
Start: 2019-10-04 | End: 2019-11-08

## 2019-10-04 RX ORDER — TOPIRAMATE 50 MG/1
50 TABLET, FILM COATED ORAL 2 TIMES DAILY
Qty: 180 TABLET | Refills: 3 | Status: SHIPPED | OUTPATIENT
Start: 2019-10-04 | End: 2020-09-09

## 2019-10-04 RX ORDER — LEVOTHYROXINE SODIUM 137 UG/1
137 TABLET ORAL DAILY
Qty: 90 TABLET | Refills: 1 | Status: SHIPPED | OUTPATIENT
Start: 2019-10-04 | End: 2019-10-07

## 2019-10-04 ASSESSMENT — MIFFLIN-ST. JEOR: SCORE: 1415.34

## 2019-10-04 ASSESSMENT — ACTIVITIES OF DAILY LIVING (ADL): CURRENT_FUNCTION: NO ASSISTANCE NEEDED

## 2019-10-04 ASSESSMENT — ENCOUNTER SYMPTOMS: BREAST MASS: 0

## 2019-10-04 ASSESSMENT — PAIN SCALES - GENERAL: PAINLEVEL: NO PAIN (0)

## 2019-10-04 NOTE — RESULT ENCOUNTER NOTE
Increase levothyroxine to 137 mcg daily and recheck in 2 months.     Kidney and liver function normal.  Blood sugar is normal.     Magnesium is normal.     CBC is normal.    Tomasa Rios M.D.

## 2019-10-04 NOTE — PROGRESS NOTES
"SUBJECTIVE:   Marilee Marks is a 73 year old female who presents for Preventive Visit.    Chief Complaint   Patient presents with     Physical     Flu Shot       Are you in the first 12 months of your Medicare coverage?  No    Healthy Habits:     In general, how would you rate your overall health?  Good    Frequency of exercise:  None    Do you usually eat at least 4 servings of fruit and vegetables a day, include whole grains    & fiber and avoid regularly eating high fat or \"junk\" foods?  Yes    Taking medications regularly:  Yes    Medication side effects:  None    Ability to successfully perform activities of daily living:  No assistance needed    Home Safety:  No safety concerns identified    Hearing Impairment:  No hearing concerns    In the past 6 months, have you been bothered by leaking of urine?  No    In general, how would you rate your overall mental or emotional health?  Good      PHQ-2 Total Score: 0    Additional concerns today:  No    Do you feel safe in your environment? Yes    Do you have a Health Care Directive? Yes: Advance Directive has been received and scanned.      Fall risk       Cognitive Screening   1) Repeat 3 items (Leader, Season, Table)    2) Clock draw: NORMAL  3) 3 item recall: Recalls 3 objects  Results: 3 items recalled: COGNITIVE IMPAIRMENT LESS LIKELY    Mini-CogTM Copyright S Megan. Licensed by the author for use in [facility]; reprinted with permission (sofahad@.[facility]). All rights reserved.      Do you have sleep apnea, excessive snoring or daytime drowsiness?: no    Reviewed and updated as needed this visit by clinical staff  Tobacco  Allergies  Meds  Problems  Med Hx  Surg Hx  Fam Hx         Reviewed and updated as needed this visit by Provider  Problems        Social History     Tobacco Use     Smoking status: Never Smoker     Smokeless tobacco: Never Used   Substance Use Topics     Alcohol use: Yes     Comment: OCCAS.     If you drink alcohol do " "you typically have >3 drinks per day or >7 drinks per week? No    No flowsheet data found.        Hypothyroidism Follow-up    Since last visit, patient describes the following symptoms: dry skin and fatigue      Current providers sharing in care for this patient include:   Patient Care Team:  Tomasa Rios MD as PCP - General (Family Practice)  Tomasa Rios MD as Assigned PCP  Luzma Finn MD as MD (Internal Medicine)  Tomasa Rios MD as Referring Physician (Family Practice)    The following health maintenance items are reviewed in Epic and correct as of today:  Health Maintenance   Topic Date Due     ADVANCE CARE PLANNING  10/17/2016     ZOSTER IMMUNIZATION (2 of 3) 12/21/2016     MEDICARE ANNUAL WELLNESS VISIT  09/19/2017     LIPID  02/03/2019     INFLUENZA VACCINE (1) 09/01/2019     TSH W/FREE T4 REFLEX  10/15/2019     FALL RISK ASSESSMENT  10/15/2019     MAMMO SCREENING  01/10/2021     DTAP/TDAP/TD IMMUNIZATION (3 - Td) 10/14/2023     COLONOSCOPY  04/10/2027     DEXA  Completed     HEPATITIS C SCREENING  Completed     PHQ-2  Completed     PNEUMOCOCCAL IMMUNIZATION 65+ LOW/MEDIUM RISK  Completed     IPV IMMUNIZATION  Aged Out     MENINGITIS IMMUNIZATION  Aged Out     Lab work is in process  Pneumonia Vaccine: utd  Mammogram Screening:  utd     Review of Systems   Breasts:  Negative for tenderness, breast mass and discharge.   Genitourinary: Negative for pelvic pain, vaginal bleeding and vaginal discharge.     Constitutional, HEENT, cardiovascular, pulmonary, gi and gu systems are negative, except as otherwise noted.    OBJECTIVE:   BP (!) 142/72   Pulse 66   Temp 97.8  F (36.6  C) (Tympanic)   Resp 18   Ht 1.626 m (5' 4\")   Wt 92.5 kg (204 lb)   SpO2 99%   Breastfeeding? No   BMI 35.02 kg/m   Estimated body mass index is 35.02 kg/m  as calculated from the following:    Height as of this encounter: 1.626 m (5' 4\").    Weight as of this encounter: 92.5 kg (204 lb).  Physical " Exam  GENERAL: healthy, alert and no distress  NECK: no adenopathy, no asymmetry, masses, or scars and thyroid normal to palpation  RESP: lungs clear to auscultation - no rales, rhonchi or wheezes  CV: regular rate and rhythm, normal S1 S2, no S3 or S4, no murmur, click or rub, no peripheral edema and peripheral pulses strong  ABDOMEN: soft, nontender, no hepatosplenomegaly, no masses and bowel sounds normal  MS: no gross musculoskeletal defects noted, no edema    Diagnostic Test Results:  Labs reviewed in Epic    ASSESSMENT / PLAN:   1. Medicare annual wellness visit, subsequent       2. Hypothyroidism, unspecified type     - Comprehensive metabolic panel  - TSH with free T4 reflex  - levothyroxine (SYNTHROID/LEVOTHROID) 125 MCG tablet; Take 1 tablet (125 mcg) by mouth daily  Dispense: 90 tablet; Refill: 3    3. Primary osteoarthritis, unspecified site     - celecoxib (CELEBREX) 200 MG capsule; Take 1 capsule (200 mg) by mouth daily  Dispense: 90 capsule; Refill: 3    4. Fatigue, unspecified type     - CBC with platelets differential  - Comprehensive metabolic panel  - TSH with free T4 reflex    5. BMI 35.0-35.9,adult     - Comprehensive metabolic panel  - topiramate (TOPAMAX) 50 MG tablet; Take 1 tablet (50 mg) by mouth 2 times daily  Dispense: 180 tablet; Refill: 3    6. Leg cramps     - Magnesium    7.  HYPERTENSION.  Start hydrochlorothiazide 12.5 mg daily, recheck with RN in 2-3 weeks. Bmp at that time.     End of Life Planning:  Patient currently has an advanced directive: No.  I have verified the patient's ablity to prepare an advanced directive/make health care decisions.  Literature was provided to assist patient in preparing an advanced directive.    COUNSELING:  Reviewed preventive health counseling, as reflected in patient instructions       Regular exercise       Healthy diet/nutrition    Estimated body mass index is 35.02 kg/m  as calculated from the following:    Height as of this encounter: 1.626 m  "(5' 4\").    Weight as of this encounter: 92.5 kg (204 lb).    Weight management plan: Discussed healthy diet and exercise guidelines     reports that she has never smoked. She has never used smokeless tobacco.      Appropriate preventive services were discussed with this patient, including applicable screening as appropriate for cardiovascular disease, diabetes, osteopenia/osteoporosis, and glaucoma.  As appropriate for age/gender, discussed screening for colorectal cancer, prostate cancer, breast cancer, and cervical cancer. Checklist reviewing preventive services available has been given to the patient.    Reviewed patients plan of care and provided an AVS. The Basic Care Plan (routine screening as documented in Health Maintenance) for Marilee meets the Care Plan requirement. This Care Plan has been established and reviewed with the Patient.    Counseling Resources:  ATP IV Guidelines  Pooled Cohorts Equation Calculator  Breast Cancer Risk Calculator  FRAX Risk Assessment  ICSI Preventive Guidelines  Dietary Guidelines for Americans, 2010  USDA's MyPlate  ASA Prophylaxis  Lung CA Screening    Tomasa Rios MD  [facility]    Identified Health Risks:  "

## 2019-10-04 NOTE — PATIENT INSTRUCTIONS
Start hydrochlorothiazide 12.5 mg daily  Recheck blood pressure and blood work (kidney function/electrolytes) at that time in 2-3 weeks.      CBD looks like it helps reduce inflammation in people.  One is CBD product by [employer] that run $40-70 per month (please check website). Second product is Palmatoylethanolamide which is binds to the CBD receptor, this is used in [place].  The product can be found at [employer] and one takes 350 mg twice a day.      Tomasa Rios M.D.        Our Clinic hours are:  Mondays    7:20 am - 7 pm  Tues -  Fri  7:20 am - 5 pm    Clinic Phone: [phone]    The clinic lab opens at 7:30 am Mon - Fri and appointments are required.    [facility]. [phone]  Monday  8 am - 7pm  Tues - Fri 8 am - 5:30 pm

## 2019-11-02 ENCOUNTER — HEALTH MAINTENANCE LETTER (OUTPATIENT)
Age: 74
End: 2019-11-02

## 2019-11-08 ENCOUNTER — ALLIED HEALTH/NURSE VISIT (OUTPATIENT)
Dept: FAMILY MEDICINE | Facility: CLINIC | Age: 74
End: 2019-11-08
Payer: COMMERCIAL

## 2019-11-08 ENCOUNTER — TELEPHONE (OUTPATIENT)
Dept: FAMILY MEDICINE | Facility: CLINIC | Age: 74
End: 2019-11-08

## 2019-11-08 VITALS — SYSTOLIC BLOOD PRESSURE: 138 MMHG | DIASTOLIC BLOOD PRESSURE: 60 MMHG | HEART RATE: 70 BPM

## 2019-11-08 DIAGNOSIS — I10 ESSENTIAL HYPERTENSION: ICD-10-CM

## 2019-11-08 DIAGNOSIS — E66.09 NON MORBID OBESITY DUE TO EXCESS CALORIES: ICD-10-CM

## 2019-11-08 LAB
ALT SERPL W P-5'-P-CCNC: 32 U/L (ref 0–50)
ANION GAP SERPL CALCULATED.3IONS-SCNC: 6 MMOL/L (ref 3–14)
AST SERPL W P-5'-P-CCNC: 17 U/L (ref 0–45)
BUN SERPL-MCNC: 17 MG/DL (ref 7–30)
CALCIUM SERPL-MCNC: 10 MG/DL (ref 8.5–10.1)
CHLORIDE SERPL-SCNC: 111 MMOL/L (ref 94–109)
CO2 SERPL-SCNC: 25 MMOL/L (ref 20–32)
CREAT SERPL-MCNC: 0.71 MG/DL (ref 0.52–1.04)
ERYTHROCYTE [DISTWIDTH] IN BLOOD BY AUTOMATED COUNT: 12.6 % (ref 10–15)
GFR SERPL CREATININE-BSD FRML MDRD: 85 ML/MIN/{1.73_M2}
GLUCOSE SERPL-MCNC: 94 MG/DL (ref 70–99)
HCT VFR BLD AUTO: 38.6 % (ref 35–47)
HGB BLD-MCNC: 12.2 G/DL (ref 11.7–15.7)
MCH RBC QN AUTO: 30.4 PG (ref 26.5–33)
MCHC RBC AUTO-ENTMCNC: 31.6 G/DL (ref 31.5–36.5)
MCV RBC AUTO: 96 FL (ref 78–100)
PLATELET # BLD AUTO: 235 10E9/L (ref 150–450)
POTASSIUM SERPL-SCNC: 4.4 MMOL/L (ref 3.4–5.3)
RBC # BLD AUTO: 4.01 10E12/L (ref 3.8–5.2)
SODIUM SERPL-SCNC: 142 MMOL/L (ref 133–144)
WBC # BLD AUTO: 4 10E9/L (ref 4–11)

## 2019-11-08 PROCEDURE — 84450 TRANSFERASE (AST) (SGOT): CPT | Performed by: FAMILY MEDICINE

## 2019-11-08 PROCEDURE — 84460 ALANINE AMINO (ALT) (SGPT): CPT | Performed by: FAMILY MEDICINE

## 2019-11-08 PROCEDURE — 36415 COLL VENOUS BLD VENIPUNCTURE: CPT | Performed by: FAMILY MEDICINE

## 2019-11-08 PROCEDURE — 80048 BASIC METABOLIC PNL TOTAL CA: CPT | Performed by: FAMILY MEDICINE

## 2019-11-08 PROCEDURE — 85027 COMPLETE CBC AUTOMATED: CPT | Performed by: FAMILY MEDICINE

## 2019-11-08 PROCEDURE — 99207 ZZC NO CHARGE NURSE ONLY: CPT

## 2019-11-08 RX ORDER — HYDROCHLOROTHIAZIDE 12.5 MG/1
12.5 TABLET ORAL DAILY
Qty: 90 TABLET | Refills: 1 | Status: SHIPPED | OUTPATIENT
Start: 2019-11-08 | End: 2020-03-26

## 2019-11-08 RX ORDER — HYDROCHLOROTHIAZIDE 12.5 MG/1
12.5 TABLET ORAL DAILY
Qty: 30 TABLET | Refills: 0 | Status: CANCELLED | OUTPATIENT
Start: 2019-11-08

## 2019-11-08 NOTE — TELEPHONE ENCOUNTER
Marilee Marks is a 73 year old year old patient who comes in today for a Blood Pressure check because of new medication.  Vital Signs as repeated by /60 P 70  Patient is taking medication as prescribed  Patient is tolerating medications well.  Current complaints: none  Disposition:  patient to continue with the same medication    Patient to lab for BMP - will need 90 day supply sent to mail order pharmacy.     Ileana Tan RN

## 2019-11-08 NOTE — NURSING NOTE
Marilee Marks is a 73 year old year old patient who comes in today for a Blood Pressure check because of new medication.  Vital Signs as repeated by /60 P 70  Patient is taking medication as prescribed  Patient is tolerating medications well.  Current complaints: none  Disposition:  patient to continue with the same medication

## 2019-11-20 ENCOUNTER — TRANSFERRED RECORDS (OUTPATIENT)
Dept: HEALTH INFORMATION MANAGEMENT | Facility: CLINIC | Age: 74
End: 2019-11-20

## 2019-11-26 ENCOUNTER — OFFICE VISIT (OUTPATIENT)
Dept: FAMILY MEDICINE | Facility: CLINIC | Age: 74
End: 2019-11-26
Payer: COMMERCIAL

## 2019-11-26 VITALS
HEART RATE: 71 BPM | DIASTOLIC BLOOD PRESSURE: 68 MMHG | BODY MASS INDEX: 35.17 KG/M2 | WEIGHT: 206 LBS | OXYGEN SATURATION: 97 % | HEIGHT: 64 IN | TEMPERATURE: 98.4 F | SYSTOLIC BLOOD PRESSURE: 138 MMHG | RESPIRATION RATE: 16 BRPM

## 2019-11-26 DIAGNOSIS — I10 BENIGN ESSENTIAL HYPERTENSION: ICD-10-CM

## 2019-11-26 DIAGNOSIS — M16.11 PRIMARY OSTEOARTHRITIS OF RIGHT HIP: ICD-10-CM

## 2019-11-26 DIAGNOSIS — Z86.718 PERSONAL HISTORY OF DVT (DEEP VEIN THROMBOSIS): ICD-10-CM

## 2019-11-26 DIAGNOSIS — G47.33 OSA (OBSTRUCTIVE SLEEP APNEA): ICD-10-CM

## 2019-11-26 DIAGNOSIS — E66.01 MORBID OBESITY (H): ICD-10-CM

## 2019-11-26 DIAGNOSIS — Z01.818 PREOP GENERAL PHYSICAL EXAM: Primary | ICD-10-CM

## 2019-11-26 DIAGNOSIS — E03.9 HYPOTHYROIDISM, UNSPECIFIED TYPE: ICD-10-CM

## 2019-11-26 LAB
ANION GAP SERPL CALCULATED.3IONS-SCNC: 3 MMOL/L (ref 3–14)
BUN SERPL-MCNC: 21 MG/DL (ref 7–30)
CALCIUM SERPL-MCNC: 10 MG/DL (ref 8.5–10.1)
CHLORIDE SERPL-SCNC: 112 MMOL/L (ref 94–109)
CO2 SERPL-SCNC: 25 MMOL/L (ref 20–32)
CREAT SERPL-MCNC: 0.84 MG/DL (ref 0.52–1.04)
ERYTHROCYTE [DISTWIDTH] IN BLOOD BY AUTOMATED COUNT: 12.4 % (ref 10–15)
GFR SERPL CREATININE-BSD FRML MDRD: 69 ML/MIN/{1.73_M2}
GLUCOSE SERPL-MCNC: 92 MG/DL (ref 70–99)
HCT VFR BLD AUTO: 40 % (ref 35–47)
HGB BLD-MCNC: 12.5 G/DL (ref 11.7–15.7)
MCH RBC QN AUTO: 30.1 PG (ref 26.5–33)
MCHC RBC AUTO-ENTMCNC: 31.3 G/DL (ref 31.5–36.5)
MCV RBC AUTO: 96 FL (ref 78–100)
PLATELET # BLD AUTO: 263 10E9/L (ref 150–450)
POTASSIUM SERPL-SCNC: 4.2 MMOL/L (ref 3.4–5.3)
RBC # BLD AUTO: 4.15 10E12/L (ref 3.8–5.2)
SODIUM SERPL-SCNC: 140 MMOL/L (ref 133–144)
WBC # BLD AUTO: 4.8 10E9/L (ref 4–11)

## 2019-11-26 PROCEDURE — 80048 BASIC METABOLIC PNL TOTAL CA: CPT | Performed by: NURSE PRACTITIONER

## 2019-11-26 PROCEDURE — 99207 C PAF COMPLETED  NO CHARGE: CPT | Mod: 25 | Performed by: NURSE PRACTITIONER

## 2019-11-26 PROCEDURE — 36415 COLL VENOUS BLD VENIPUNCTURE: CPT | Performed by: NURSE PRACTITIONER

## 2019-11-26 PROCEDURE — 85027 COMPLETE CBC AUTOMATED: CPT | Performed by: NURSE PRACTITIONER

## 2019-11-26 PROCEDURE — 99214 OFFICE O/P EST MOD 30 MIN: CPT | Mod: 25 | Performed by: NURSE PRACTITIONER

## 2019-11-26 PROCEDURE — 93000 ELECTROCARDIOGRAM COMPLETE: CPT | Performed by: NURSE PRACTITIONER

## 2019-11-26 ASSESSMENT — PAIN SCALES - GENERAL: PAINLEVEL: SEVERE PAIN (7)

## 2019-11-26 ASSESSMENT — MIFFLIN-ST. JEOR: SCORE: 1419.41

## 2019-11-26 NOTE — RESULT ENCOUNTER NOTE
Arlenevic Hunt    Your pre-op lab results came back within normal limits. Please let us know if you have any questions.     Take care,    ALANIS Rivera CNP

## 2019-11-26 NOTE — PROGRESS NOTES
[facility]  [address]  [facility] [address]  [phone]  Dept: [phone]    PRE-OP EVALUATION:  Today's date: 2019    Marilee Marks (: 1945) presents for pre-operative evaluation assessment as requested by Dr. Deras.  She requires evaluation and anesthesia risk assessment prior to undergoing surgery/procedure for treatment of Right Total Hip Replacement .    Proposed Surgery/ Procedure: Right Total Hip Replacement   Date of Surgery/ Procedure: 19  Time of Surgery/ Procedure: ?  Hospital/Surgical Facility: [facility]  Fax number for surgical facility: [phone]  Primary Physician: Tomasa Rios  Type of Anesthesia Anticipated: to be determined    Patient has a Health Care Directive or Living Will:  YES     1. NO - Do you have a history of heart attack, stroke, stent, bypass or surgery on an artery in the head, neck, heart or legs?  2. NO - Do you ever have any pain or discomfort in your chest?  3. NO - Do you have a history of  Heart Failure?  4. NO - Are you troubled by shortness of breath when: walking on the level, up a slight hill or at night?  5. NO - Do you currently have a cold, bronchitis or other respiratory infection?  6. NO - Do you have a cough, shortness of breath or wheezing?  7. NO - Do you sometimes get pains in the calves of your legs when you walk?  8. YES - Do you or anyone in your family have previous history of blood clots? After knee surgery in , wasn't taken her post op Xarelto as prescribed for prevention and was on HRT at that time.  9. NO - Do you or does anyone in your family have a serious bleeding problem such as prolonged bleeding following surgeries or cuts?  10. NO - Have you ever had problems with anemia or been told to take iron pills?  11. NO - Have you had any abnormal blood loss such as black, tarry or bloody stools, or abnormal vaginal bleeding?  12. NO - Have you ever had a blood  transfusion?  13. NO - Have you or any of your relatives ever had problems with anesthesia?  14. NO - Do you have sleep apnea, excessive snoring or daytime drowsiness?  15. NO - Do you have any prosthetic heart valves?  16. YES - DO YOU HAVE PROSTHETIC JOINTS? Right knee  17. NO - Is there any chance that you may be pregnant?      HPI:     HPI related to upcoming procedure: persistent right hip pain despite conservative measures, elects surgery      HYPERTENSION - Patient has longstanding history of HTN , currently denies any symptoms referable to elevated blood pressure. Specifically denies chest pain, palpitations, dyspnea, orthopnea, PND or peripheral edema. Blood pressure readings have been in normal range. Current medication regimen is as listed below. Patient denies any side effects of medication.     HYPOTHYROIDISM - Patient has a longstanding history of chronic Hypothyroidism. Patient has been doing well, noting no tremor, insomnia, hair loss or changes in skin texture. Continues to take medications as directed, without adverse reactions or side effects. Last TSH   Lab Results   Component Value Date    TSH 4.75 (H) 10/04/2019   .        MEDICAL HISTORY:     Patient Active Problem List    Diagnosis Date Noted     Benign essential hypertension 11/26/2019     Priority: Medium     Personal history of DVT (deep vein thrombosis) 11/26/2019     Priority: Medium     Obesity (BMI 35.0-39.9) with comorbidity (H) 10/15/2018     Priority: Medium     Elevated blood pressure reading without diagnosis of hypertension 09/25/2017     Priority: Medium     Non morbid obesity 12/19/2016     Priority: Medium     Hypothyroidism, unspecified type 09/19/2016     Priority: Medium     Osteopenia 07/15/2015     Priority: Medium     Status post total knee replacement RIGHT 10/30/2014     Priority: Medium     Enchondroma of right femur 07/16/2014     Priority: Medium     BRUCE (obstructive sleep apnea) 04/19/2013     Priority: Medium      Mild without significant desaturations.  Currently not using CPAP.       Health Care Home 12/17/2012     Priority: Medium     Susan Larsen, RN-PHN  FPA / [facility]   [phone]    DX V65.8 REPLACED WITH [id number] HEALTH CARE HOME (04/08/2013)       Advanced directives, counseling/discussion 10/17/2011     Priority: Medium     Patient has completed an Advance/Health Care Directive (HCD), to bring in copy to be scanned into Epic.    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     Past Surgical History:   Procedure Laterality Date     ARTHROPLASTY KNEE Right 10/30/2014    Procedure: ARTHROPLASTY KNEE;  Surgeon: Fabian Deras MD;  Location: [facility] OR     ARTHROSCOPY KNEE Right 8/20/2015    Procedure: ARTHROSCOPY KNEE;  Surgeon: Fabian Deras MD;  Location: [facility] OR     BREAST LUMPECTOMY, RT/LT  1986    LT non-ca     C/SECTION, CLASSICAL  1973     COLONOSCOPY  1/2007    repeat in 10 years     HC REVISION GASTROPLASTY,OBESITY, NON-OBDULIA RESTRICT DEVICE  1979     LAPAROSCOPIC HERNIORRHAPHY VENTRAL  11/21/2011    Procedure:LAPAROSCOPIC HERNIORRHAPHY VENTRAL; Laparoscopic Left Spigelian Herniorraphy With Mesh & Repair of Left Lower Quadrant Hernia With  Mesh; Surgeon:TODD PHAN; Location:[facility] OR     SURGICAL HISTORY OF -   7/27/2009    Left Heart Cath, Left Ventriculography, Coronary Angiogram, Closure Devi, [facility]     TUBAL LIGATION       Current Outpatient Medications   Medication Sig Dispense Refill     CALTRATE 600+D 600-400 MG-UNIT PO TABS Take one tablet by mouth every day       CENTRUM SILVER PO TABS Take one tablet by mouth every day  3     hydrochlorothiazide (HYDRODIURIL) 12.5 MG tablet Take 1 tablet (12.5 mg) by mouth daily 90 tablet 1     levothyroxine  "(SYNTHROID/LEVOTHROID) 137 MCG tablet Take 1 tablet (137 mcg) by mouth daily 90 tablet 1     topiramate (TOPAMAX) 50 MG tablet Take 1 tablet (50 mg) by mouth 2 times daily 180 tablet 3     VITAMIN D3 1000 UNIT PO CAPS 5000mg tablet,  1tab daily       celecoxib (CELEBREX) 200 MG capsule Take 1 capsule (200 mg) by mouth daily (Patient not taking: Reported on 11/26/2019) 90 capsule 3     OTC products: None, except as noted above    Allergies   Allergen Reactions     Codeine Itching      Latex Allergy: NO    Social History     Tobacco Use     Smoking status: Never Smoker     Smokeless tobacco: Never Used   Substance Use Topics     Alcohol use: Yes     Comment: OCCAS.     History   Drug Use No       REVIEW OF SYSTEMS:   CONSTITUTIONAL: NEGATIVE for fever, chills, change in weight  INTEGUMENTARY/SKIN: NEGATIVE for worrisome rashes, moles or lesions  EYES: NEGATIVE for vision changes or irritation  ENT/MOUTH: NEGATIVE for ear, mouth and throat problems  RESP: NEGATIVE for significant cough or SOB  CV: NEGATIVE for chest pain, palpitations or peripheral edema  GI: NEGATIVE for nausea, abdominal pain, heartburn, or change in bowel habits  : NEGATIVE for frequency, dysuria, or hematuria  MUSCULOSKELETAL: NEGATIVE for significant arthralgias or myalgia  NEURO: NEGATIVE for weakness, dizziness or paresthesias  ENDOCRINE: NEGATIVE for temperature intolerance, skin/hair changes  HEME: NEGATIVE for bleeding problems  PSYCHIATRIC: NEGATIVE for changes in mood or affect    EXAM:   /68 (BP Location: Right arm, Cuff Size: Adult Large)   Pulse 71   Temp 98.4  F (36.9  C) (Tympanic)   Resp 16   Ht 1.626 m (5' 4\")   Wt 93.4 kg (206 lb)   SpO2 97%   Breastfeeding No   BMI 35.36 kg/m      GENERAL APPEARANCE: healthy, alert and no distress     EYES: EOMI, PERRL     HENT: ear canals and TM's normal and nose and mouth without ulcers or lesions     NECK: no adenopathy, no asymmetry, masses, or scars and thyroid normal to " palpation     RESP: lungs clear to auscultation - no rales, rhonchi or wheezes     CV: regular rates and rhythm, normal S1 S2, no S3 or S4 and no murmur, click or rub     ABDOMEN:  soft, nontender, no HSM or masses and bowel sounds normal     MS: extremities normal- no gross deformities noted, no evidence of inflammation in joints, FROM in all extremities.     SKIN: no suspicious lesions or rashes     NEURO: Normal strength and tone, sensory exam grossly normal, mentation intact and speech normal     PSYCH: mentation appears normal. and affect normal/bright     LYMPHATICS: No cervical adenopathy    DIAGNOSTICS:     EKG: Normal Sinus Rhythm, normal axis, normal intervals, no acute ST/T changes c/w ischemia, no LVH by voltage criteria, unchanged from previous tracings    Labs Resulted Today:   Results for orders placed or performed in visit on 11/26/19   CBC with platelets     Status: Abnormal   Result Value Ref Range    WBC 4.8 4.0 - 11.0 10e9/L    RBC Count 4.15 3.8 - 5.2 10e12/L    Hemoglobin 12.5 11.7 - 15.7 g/dL    Hematocrit 40.0 35.0 - 47.0 %    MCV 96 78 - 100 fl    MCH 30.1 26.5 - 33.0 pg    MCHC 31.3 (L) 31.5 - 36.5 g/dL    RDW 12.4 10.0 - 15.0 %    Platelet Count 263 150 - 450 10e9/L     Labs Drawn and in Process:   Unresulted Labs Ordered in the Past 30 Days of this Admission     Date and Time Order Name Status Description    11/26/2019 0905 BASIC METABOLIC PANEL In process           Recent Labs   Lab Test 11/08/19  0915 10/04/19  0940   HGB 12.2 12.5    238    139   POTASSIUM 4.4 4.7   CR 0.71 0.82        IMPRESSION:   Reason for surgery/procedure: Primary osteoarthritis of right hip.  Diagnosis/reason for consult: The surgeon is requesting consultation regarding anesthesia and surgical risk for this patient with respect to current and past medical conditions.      The proposed surgical procedure is considered INTERMEDIATE risk.    REVISED CARDIAC RISK INDEX  The patient has the following  serious cardiovascular risks for perioperative complications such as (MI, PE, VFib and 3  AV Block):  No serious cardiac risks  INTERPRETATION: 0 risks: Class I (very low risk - 0.4% complication rate)    The patient has the following additional risks for perioperative complications:  No identified additional risks      ICD-10-CM    1. Preop general physical exam Z01.818 CBC with platelets     Basic metabolic panel  (Ca, Cl, CO2, Creat, Gluc, K, Na, BUN)     EKG 12-lead complete w/read - Clinics   2. Primary osteoarthritis of right hip M16.11    3. Hypothyroidism, unspecified type E03.9    4. BRUCE (obstructive sleep apnea) G47.33    5. Benign essential hypertension I10    6. Personal history of DVT (deep vein thrombosis) Z86.718     provoked after 2014 knee replacement surgery   7. Morbid obesity (H) E66.01        RECOMMENDATIONS:     --Because of DVT history, patient should be on post-op prophylaxis and wear compression hose before & after surgery    Obstructive Sleep Apnea (or suspected sleep apnea)  Hospital staff are advised to monitor for sleep related oxygen desaturations due to suspicion of BRUCE      --Patient is to take all scheduled medications on the day of surgery EXCEPT for modifications listed below.    Anticoagulant or Antiplatelet Medication Use  NSAIDS: Celecoxib (Celebrex):    Stop 3 days prior to surgery        APPROVAL GIVEN to proceed with proposed procedure, without further diagnostic evaluation       Signed Electronically by: ALANIS Guerrero CNP    Copy of this evaluation report is provided to requesting physician.    [facility] Preop Guidelines    Revised Cardiac Risk Index

## 2019-12-23 ENCOUNTER — TRANSFERRED RECORDS (OUTPATIENT)
Dept: HEALTH INFORMATION MANAGEMENT | Facility: CLINIC | Age: 74
End: 2019-12-23

## 2019-12-23 DIAGNOSIS — M16.11 PRIMARY OSTEOARTHRITIS OF RIGHT HIP: Primary | ICD-10-CM

## 2019-12-23 NOTE — TELEPHONE ENCOUNTER
"Requested Prescriptions   Pending Prescriptions Disp Refills     ibuprofen (ADVIL/MOTRIN) 800 MG tablet [Pharmacy Med Name: IBUPROFEN TABS 800MG] 90 tablet 12     Sig: TAKE 1 TABLET EVERY 8 HOURS AS NEEDED FOR MODERATE PAIN       NSAID Medications Failed - 12/23/2019 10:51 AM        Failed - Patient is age 6-64 years        Failed - Medication is active on med list        Passed - Blood pressure under 140/90 in past 12 months     BP Readings from Last 3 Encounters:   11/26/19 138/68   11/08/19 138/60   10/04/19 (!) 142/72                 Passed - Normal ALT on file in past 12 months     Recent Labs   Lab Test 11/08/19  0915   ALT 32             Passed - Normal AST on file in past 12 months     Recent Labs   Lab Test 11/08/19  0915   AST 17             Passed - Recent (12 mo) or future (30 days) visit within the authorizing provider's specialty     Patient has had an office visit with the authorizing provider or a provider within the authorizing providers department within the previous 12 mos or has a future within next 30 days. See \"Patient Info\" tab in inbasket, or \"Choose Columns\" in Meds & Orders section of the refill encounter.              Passed - Normal CBC on file in past 12 months     Recent Labs   Lab Test 11/26/19  0936   WBC 4.8   RBC 4.15   HGB 12.5   HCT 40.0                    Passed - No active pregnancy on record        Passed - Normal serum creatinine on file in past 12 months     Recent Labs   Lab Test 11/26/19  0936   CR 0.84             Passed - No positive pregnancy test in past 12 months        Last Written Prescription Date:  12/23/19  Last Fill Quantity: 90,  # refills: 12   Last office visit: 11/26/2019 with prescribing provider:  Tomasa Rios     Future Office Visit:      "

## 2019-12-26 RX ORDER — IBUPROFEN 800 MG/1
TABLET, FILM COATED ORAL
Qty: 90 TABLET | Refills: 12 | Status: SHIPPED | OUTPATIENT
Start: 2019-12-26 | End: 2020-09-23

## 2019-12-26 NOTE — TELEPHONE ENCOUNTER
Routing refill request to provider for review/approval because:  Patient is out of age range for RN to approve.    Nery BASILIO RN

## 2020-01-23 ENCOUNTER — TRANSFERRED RECORDS (OUTPATIENT)
Dept: HEALTH INFORMATION MANAGEMENT | Facility: CLINIC | Age: 75
End: 2020-01-23

## 2020-01-31 ENCOUNTER — OFFICE VISIT (OUTPATIENT)
Dept: FAMILY MEDICINE | Facility: CLINIC | Age: 75
End: 2020-01-31
Payer: COMMERCIAL

## 2020-01-31 ENCOUNTER — ANCILLARY PROCEDURE (OUTPATIENT)
Dept: GENERAL RADIOLOGY | Facility: CLINIC | Age: 75
End: 2020-01-31
Attending: FAMILY MEDICINE
Payer: COMMERCIAL

## 2020-01-31 VITALS
SYSTOLIC BLOOD PRESSURE: 148 MMHG | BODY MASS INDEX: 33.78 KG/M2 | WEIGHT: 196.8 LBS | HEART RATE: 64 BPM | DIASTOLIC BLOOD PRESSURE: 66 MMHG | TEMPERATURE: 97.2 F

## 2020-01-31 DIAGNOSIS — S69.92XA WRIST INJURY, LEFT, INITIAL ENCOUNTER: Primary | ICD-10-CM

## 2020-01-31 DIAGNOSIS — S52.502A CLOSED FRACTURE OF DISTAL END OF LEFT RADIUS, UNSPECIFIED FRACTURE MORPHOLOGY, INITIAL ENCOUNTER: ICD-10-CM

## 2020-01-31 PROCEDURE — 29125 APPL SHORT ARM SPLINT STATIC: CPT | Performed by: FAMILY MEDICINE

## 2020-01-31 PROCEDURE — 99214 OFFICE O/P EST MOD 30 MIN: CPT | Mod: 25 | Performed by: FAMILY MEDICINE

## 2020-01-31 PROCEDURE — 73110 X-RAY EXAM OF WRIST: CPT | Mod: LT

## 2020-01-31 NOTE — PROGRESS NOTES
"Subjective     Marilee Marks is a 74 year old female who presents to clinic today for the following health issues:    HPI   Musculoskeletal problem/pain      Duration: yesterday     Description  Location: left arm    Intensity:  moderate    Accompanying signs and symptoms: decreased ROM, swelling    History  Previous similar problem: no   Previous evaluation:  none    Precipitating or alleviating factors:  Trauma or overuse: YES- fall  - tripped over her husbands suitcase.   Aggravating factors include: using fingers, squeezing     Therapies tried and outcome: rest/inactivity, ice, ibuprofen        Reviewed and updated as needed this visit by Provider       Review of Systems         Objective    BP (!) 148/66   Pulse 64   Temp 97.2  F (36.2  C) (Tympanic)   Wt 89.3 kg (196 lb 12.8 oz)   BMI 33.78 kg/m    Body mass index is 33.78 kg/m .  Physical Exam   GENERAL APPEARANCE: healthy, alert and no distress  ORTHO:   Wrist Exam: WRIST:  Inspection: swelling both volar and dorsal surface  Palpation: Tender: diffusely around wrist, distal radius, distal ulna, flexor tendons  Non-tender: scaphoid  Range of Motion: limited flexion/extension due to pain  Strength:  strength decreased.        ELBOW:  elbow exam : non-tender, full range of motion.       Diagnostic Test Results:  Labs reviewed in Epic  Xray - fracture of the distal radius    Volar forearm splint (orthoglass) applied by me        Assessment & Plan       ICD-10-CM    1. Wrist injury, left, initial encounter S69.92XA XR Wrist Left G/E 3 Views     APPLY SHORT ARM SPLINT STATIC   2. Closed fracture of distal end of left radius, unspecified fracture morphology, initial encounter S52.502A Orthopedic & Spine  Referral     APPLY SHORT ARM SPLINT STATIC   follow up with ortho - referral placed.   Declined anything for the pain - does have access to oxycodone from previous surgery at home but this \"makes her loopy\". She plans to ice and use " acetaminophen.           No follow-ups on file.    Tomasa Rios MD  [facility]

## 2020-02-03 ENCOUNTER — OFFICE VISIT (OUTPATIENT)
Dept: ORTHOPEDICS | Facility: CLINIC | Age: 75
End: 2020-02-03
Payer: COMMERCIAL

## 2020-02-03 ENCOUNTER — ANCILLARY PROCEDURE (OUTPATIENT)
Dept: GENERAL RADIOLOGY | Facility: CLINIC | Age: 75
End: 2020-02-03
Attending: PEDIATRICS
Payer: COMMERCIAL

## 2020-02-03 VITALS
HEIGHT: 64 IN | DIASTOLIC BLOOD PRESSURE: 66 MMHG | WEIGHT: 196 LBS | SYSTOLIC BLOOD PRESSURE: 136 MMHG | BODY MASS INDEX: 33.46 KG/M2

## 2020-02-03 DIAGNOSIS — S52.502A CLOSED FRACTURE OF DISTAL END OF LEFT RADIUS, UNSPECIFIED FRACTURE MORPHOLOGY, INITIAL ENCOUNTER: ICD-10-CM

## 2020-02-03 PROCEDURE — 73110 X-RAY EXAM OF WRIST: CPT | Mod: LT

## 2020-02-03 PROCEDURE — 99204 OFFICE O/P NEW MOD 45 MIN: CPT | Mod: 25 | Performed by: PEDIATRICS

## 2020-02-03 PROCEDURE — 29125 APPL SHORT ARM SPLINT STATIC: CPT | Mod: LT | Performed by: PEDIATRICS

## 2020-02-03 ASSESSMENT — MIFFLIN-ST. JEOR: SCORE: 1374.05

## 2020-02-03 NOTE — PROGRESS NOTES
Sports Medicine Clinic Visit    PCP: Tomasa Rios    Marilee Marks is a 74 year old female who is seen  in consultation at the request of  Tomasa Rios M.D. presenting with left wrist injury.    Injury: She reports on 1/30/20 she tripped over a suitcase and landed on her left wrist. She was seen by her PCP and placed in a volar orthoglass splint. She reports a reduction in pain and swelling since being splinted. She is right hand dominate.    Location of Pain: left wrist  Duration of Pain: 4 day(s)  Rating of Pain at worst: 6/10  Rating of Pain Currently: 1/10  Symptoms are better with: Rest  Symptoms are worse with: use of hand  Additional Features:   Positive: swelling, bruising and weakness   Negative: popping, grinding, catching, locking, instability, paresthesias and numbness  Other evaluation and/or treatments so far consists of: Ice, Tylenol and Rest  Prior History of related problems: recent hip replacement    Social History: Retired    Review of Systems  Skin: yes bruising, yes swelling  Musculoskeletal: as above  Neurologic: no numbness, paresthesias  Remainder of review of systems is negative including constitutional, CV, pulmonary, GI, except as noted in HPI or medical history.    Patient's current problem list, past medical and surgical history, and family history were reviewed.    Patient Active Problem List   Diagnosis     Vitiligo     CARDIOVASCULAR SCREENING; LDL GOAL LESS THAN 130     Advanced directives, counseling/discussion     Health Care Home     BRUCE (obstructive sleep apnea)     Enchondroma of right femur     Status post total knee replacement RIGHT     Osteopenia     Hypothyroidism, unspecified type     Non morbid obesity     Elevated blood pressure reading without diagnosis of hypertension     Obesity (BMI 35.0-39.9) with comorbidity (H)     Benign essential hypertension     Personal history of DVT (deep vein thrombosis)     Past Medical History:   Diagnosis Date      "Hyperlipidemia      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     Past Surgical History:   Procedure Laterality Date     ARTHROPLASTY KNEE Right 10/30/2014    Procedure: ARTHROPLASTY KNEE;  Surgeon: Fabian Deras MD;  Location: [facility]     ARTHROSCOPY KNEE Right 8/20/2015    Procedure: ARTHROSCOPY KNEE;  Surgeon: Fabian Deras MD;  Location: [facility]     BREAST LUMPECTOMY, RT/LT  1986    LT non-ca     C/SECTION, CLASSICAL  1973     COLONOSCOPY  1/2007    repeat in 10 years     HC REVISION GASTROPLASTY,OBESITY, NON-OBDULIA RESTRICT DEVICE  1979     LAPAROSCOPIC HERNIORRHAPHY VENTRAL  11/21/2011    Procedure:LAPAROSCOPIC HERNIORRHAPHY VENTRAL; Laparoscopic Left Spigelian Herniorraphy With Mesh & Repair of Left Lower Quadrant Hernia With  Mesh; Surgeon:TODD PHAN; Location:[facility] OR     SURGICAL HISTORY OF -   7/27/2009    Left Heart Cath, Left Ventriculography, Coronary Angiogram, Closure [place], [facility]     TUBAL LIGATION       Family History   Problem Relation Age of Onset     Respiratory Mother         emphysema age 78     Heart Disease Father         MI   ATGE 76     Thyroid Disease Brother      Objective  /66   Ht 1.626 m (5' 4\")   Wt 88.9 kg (196 lb)   BMI 33.64 kg/m      GENERAL APPEARANCE: healthy, alert and no distress   GAIT: NORMAL  SKIN: no suspicious lesions or rashes  HEENT: Sclera clear, anicteric  CV: good peripheral pulses  RESP: Breathing not labored  NEURO: Normal strength and tone, mentation intact and speech normal  PSYCH:  mentation appears normal and affect normal/bright    Bilateral Wrist and Hand exam    Inspection:       Swelling and bruising throughout left wrist    Tender:       distal radius left    Non Tender:       Remainder of the Wrist and Hand bilateral    ROM:       Decreased ROM due to pain and swelling    Strength:       Decreased strength due to pain and swelling    Neurovascular:       2+ radial pulses bilaterally with brisk capillary refill and      normal sensation to " light touch in the radial, median and ulnar nerve distributions      Radiology  I ordered, visualized and reviewed these images with the patient  Xr Wrist Left G/e 3 Views  Result Date: 2/3/2020  XR LEFT WRIST THREE OR MORE VIEWS  2/3/2020 3:27 PM HISTORY: Closed fracture of distal end of left radius, unspecified fracture morphology, initial encounter.   IMPRESSION: Distal radial fracture, the alignment and appearance unchanged from 1/31/2020. Ulnar styloid fracture is also noted. 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 Osteopenia.    Assessment:  1. Closed fracture of distal end of left radius, unspecified fracture morphology, initial encounter      Distal radius fracture, given slight angulation and intra-articular extension, recommend orthopedic surgery referral for monitoring.  Will place in splint in the interim.    Plan:  - Today's Plan of Care:  Splint and sling if needed  Referral to Orthopedic Surgery    Follow Up: as needed    Cast/splint application  Date/Time: 2/3/2020 3:55 PM  Performed by: Jared Chung ATC  Authorized by: Isadora Mcdonald MD     Consent:     Consent obtained:  Verbal    Consent given by:  Patient  Pre-procedure details:     Sensation:  Normal  Procedure details:     Laterality:  Left    Location:  Wrist    Wrist:  L wrist    Splint type:  Short arm (static)    Supplies:  Fiberglass  Post-procedure details:     Pain:  Improved    Sensation:  Normal    Patient tolerance of procedure:  Tolerated well, no immediate complications    Patient provided with cast or splint care instructions: Yes        Concerning signs and symptoms were reviewed.  The patient expressed understanding of this management plan and all questions were answered at this time.    Isadora Mcdonald MD [facility]  Primary Care Sports Medicine  [facility]

## 2020-02-03 NOTE — LETTER
2/3/2020         RE: Marilee Marks  [id number]  [place] [address]        Dear Colleague,    Thank you for referring your patient, Marilee Marks, to the [facility]. Please see a copy of my visit note below.    Sports Medicine Clinic Visit    PCP: Tomasa Rios    Marilee Marks is a 74 year old female who is seen  in consultation at the request of  Tomasa Rios M.D. presenting with left wrist injury.    Injury: She reports on 1/30/20 she tripped over a suitcase and landed on her left wrist. She was seen by her PCP and placed in a volar orthoglass splint. She reports a reduction in pain and swelling since being splinted. She is right hand dominate.    Location of Pain: left wrist  Duration of Pain: 4 day(s)  Rating of Pain at worst: 6/10  Rating of Pain Currently: 1/10  Symptoms are better with: Rest  Symptoms are worse with: use of hand  Additional Features:   Positive: swelling, bruising and weakness   Negative: popping, grinding, catching, locking, instability, paresthesias and numbness  Other evaluation and/or treatments so far consists of: Ice, Tylenol and Rest  Prior History of related problems: recent hip replacement    Social History: Retired    Review of Systems  Skin: yes bruising, yes swelling  Musculoskeletal: as above  Neurologic: no numbness, paresthesias  Remainder of review of systems is negative including constitutional, CV, pulmonary, GI, except as noted in HPI or medical history.    Patient's current problem list, past medical and surgical history, and family history were reviewed.    Patient Active Problem List   Diagnosis     Vitiligo     CARDIOVASCULAR SCREENING; LDL GOAL LESS THAN 130     Advanced directives, counseling/discussion     Health Care Home     BRUCE (obstructive sleep apnea)     Enchondroma of right femur     Status post total knee replacement RIGHT     Osteopenia     Hypothyroidism, unspecified type     Non  "morbid obesity     Elevated blood pressure reading without diagnosis of hypertension     Obesity (BMI 35.0-39.9) with comorbidity (H)     Benign essential hypertension     Personal history of DVT (deep vein thrombosis)     Past Medical History:   Diagnosis Date     Hyperlipidemia      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     Past Surgical History:   Procedure Laterality Date     ARTHROPLASTY KNEE Right 10/30/2014    Procedure: ARTHROPLASTY KNEE;  Surgeon: Fabian Deras MD;  Location: [facility]     ARTHROSCOPY KNEE Right 8/20/2015    Procedure: ARTHROSCOPY KNEE;  Surgeon: Fabian Deras MD;  Location: [facility]     BREAST LUMPECTOMY, RT/LT  1986    LT non-ca     C/SECTION, CLASSICAL  1973     COLONOSCOPY  1/2007    repeat in 10 years     HC REVISION GASTROPLASTY,OBESITY, NON-OBDULIA RESTRICT DEVICE  1979     LAPAROSCOPIC HERNIORRHAPHY VENTRAL  11/21/2011    Procedure:LAPAROSCOPIC HERNIORRHAPHY VENTRAL; Laparoscopic Left Spigelian Herniorraphy With Mesh & Repair of Left Lower Quadrant Hernia With  Mesh; Surgeon:TODD PHAN; Location:[facility]     SURGICAL HISTORY OF -   7/27/2009    Left Heart Cath, Left Ventriculography, Coronary Angiogram, Closure [facility]     TUBAL LIGATION       Family History   Problem Relation Age of Onset     Respiratory Mother         emphysema age 78     Heart Disease Father         MI   ATGE 76     Thyroid Disease Brother      Objective  /66   Ht 1.626 m (5' 4\")   Wt 88.9 kg (196 lb)   BMI 33.64 kg/m       GENERAL APPEARANCE: healthy, alert and no distress   GAIT: NORMAL  SKIN: no suspicious lesions or rashes  HEENT: Sclera clear, anicteric  CV: good peripheral pulses  RESP: Breathing not labored  NEURO: Normal strength and tone, mentation intact and speech normal  PSYCH:  mentation appears normal and affect normal/bright    Bilateral Wrist and Hand exam    Inspection:       Swelling and bruising throughout left wrist    Tender:       distal radius left    Non Tender:       " Remainder of the Wrist and Hand bilateral    ROM:       Decreased ROM due to pain and swelling    Strength:       Decreased strength due to pain and swelling    Neurovascular:       2+ radial pulses bilaterally with brisk capillary refill and      normal sensation to light touch in the radial, median and ulnar nerve distributions      Radiology  I ordered, visualized and reviewed these images with the patient  Xr Wrist Left G/e 3 Views  Result Date: 2/3/2020  XR LEFT WRIST THREE OR MORE VIEWS  2/3/2020 3:27 PM HISTORY: Closed fracture of distal end of left radius, unspecified fracture morphology, initial encounter.   IMPRESSION: Distal radial fracture, the alignment and appearance unchanged from 1/31/2020. Ulnar styloid fracture is also noted. 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 Osteopenia.    Assessment:  1. Closed fracture of distal end of left radius, unspecified fracture morphology, initial encounter      Distal radius fracture, given slight angulation and intra-articular extension, recommend orthopedic surgery referral for monitoring.  Will place in splint in the interim.    Plan:  - Today's Plan of Care:  Splint and sling if needed  Referral to Orthopedic Surgery    Follow Up: as needed    Cast/splint application  Date/Time: 2/3/2020 3:55 PM  Performed by: Jared Chung ATC  Authorized by: Isadora Mcdonald MD     Consent:     Consent obtained:  Verbal    Consent given by:  Patient  Pre-procedure details:     Sensation:  Normal  Procedure details:     Laterality:  Left    Location:  Wrist    Wrist:  L wrist    Splint type:  Short arm (static)    Supplies:  Fiberglass  Post-procedure details:     Pain:  Improved    Sensation:  Normal    Patient tolerance of procedure:  Tolerated well, no immediate complications    Patient provided with cast or splint care instructions: Yes        Concerning signs and symptoms were reviewed.  The patient expressed understanding of this management  plan and all questions were answered at this time.    Isadora Mcdonald MD CAQ  Primary Care Sports Medicine  [facility]      Again, thank you for allowing me to participate in the care of your patient.        Sincerely,        Isadora Mcdonald MD

## 2020-02-03 NOTE — PATIENT INSTRUCTIONS
Plan:  - Today's Plan of Care:  Splint and sling if needed  Referral to Orthopedic Surgery    Follow Up: as needed    If you have any further questions for your physician or physician s care team you can call [phone] and use option 3 to leave a voice message. Calls received during business hours will be returned same day.       Caring for Your Cast     A cast is used to protect an injured body part and allow it to heal by limiting the amount of motion occurring around the injury. Pain and swelling of the injured area is normal for 48 hours after your cast is put on. If you have swelling, wiggle your toes or fingers to ease it. Doing so encourages blood flow to your arm or leg.     It is important that you keep your cast dry, unless your doctor tells you differently. If the padding of the cast gets wet, your skin may be damaged and become infected. When showering or taking a bath, put the cast in a heavy plastic bag that can be held in place with a rubber band. If your cast gets wet and does not dry out in four to five hours, call your doctor s office.   To keep the cast clean, use wash clothes or baby wipes around it.   You may experience some itching inside the cast. This is normal. Avoid putting anything in the cast, even your finger, as you can injure your skin and cause infection. Try shaking some talcum powder or blowing cool air from a hair dryer into the cast to ease itching.   If these signs or symptoms develop, call your doctor immediately.       Pain gets worse     Swelling that cuts off blood flow that does not go away, even when you lift the body part above the level of your heart     Fever after itching. It may be related to an infection.     Fluid draining from your skin under the cast     Your cast may become loose as swelling goes down. If the cast feels too loose or if it is so loose you can take it off, call your doctor s office.     Your doctor or  will give you  recommendations for activity based on your injury. Some sports allow casts if properly padded by a doctor or .     For complete healing, your cast should only be removed at the direction of your doctor or clinic staff. A special saw ensures its safe removal and protects the skin and other tissue under the cast.

## 2020-02-04 NOTE — RESULT ENCOUNTER NOTE
These results were discussed during office visit.    Isadora Mcdonald MD, CAQ  Primary Care Sports Medicine  [facility]

## 2020-02-07 ENCOUNTER — TRANSFERRED RECORDS (OUTPATIENT)
Dept: HEALTH INFORMATION MANAGEMENT | Facility: CLINIC | Age: 75
End: 2020-02-07

## 2020-02-17 ENCOUNTER — TRANSFERRED RECORDS (OUTPATIENT)
Dept: HEALTH INFORMATION MANAGEMENT | Facility: CLINIC | Age: 75
End: 2020-02-17

## 2020-02-25 ENCOUNTER — TRANSFERRED RECORDS (OUTPATIENT)
Dept: HEALTH INFORMATION MANAGEMENT | Facility: CLINIC | Age: 75
End: 2020-02-25

## 2020-03-10 ENCOUNTER — TRANSFERRED RECORDS (OUTPATIENT)
Dept: HEALTH INFORMATION MANAGEMENT | Facility: CLINIC | Age: 75
End: 2020-03-10

## 2020-03-26 DIAGNOSIS — I10 ESSENTIAL HYPERTENSION: ICD-10-CM

## 2020-03-26 RX ORDER — HYDROCHLOROTHIAZIDE 12.5 MG/1
TABLET ORAL
Qty: 90 TABLET | Refills: 3 | Status: SHIPPED | OUTPATIENT
Start: 2020-03-26 | End: 2020-09-24

## 2020-03-31 ENCOUNTER — TRANSFERRED RECORDS (OUTPATIENT)
Dept: HEALTH INFORMATION MANAGEMENT | Facility: CLINIC | Age: 75
End: 2020-03-31

## 2020-04-08 ENCOUNTER — MYC MEDICAL ADVICE (OUTPATIENT)
Dept: FAMILY MEDICINE | Facility: CLINIC | Age: 75
End: 2020-04-08

## 2020-04-08 NOTE — TELEPHONE ENCOUNTER
Patient is asking if she can take ibuprofen for a couple of weeks - see [employer] message.    Ileana Tan RN

## 2020-04-20 DIAGNOSIS — E03.9 HYPOTHYROIDISM, UNSPECIFIED TYPE: ICD-10-CM

## 2020-04-20 RX ORDER — LEVOTHYROXINE SODIUM 137 UG/1
TABLET ORAL
Qty: 90 TABLET | Refills: 3 | OUTPATIENT
Start: 2020-04-20

## 2020-04-20 NOTE — TELEPHONE ENCOUNTER
"Requested Prescriptions   Pending Prescriptions Disp Refills     levothyroxine (SYNTHROID/LEVOTHROID) 137 MCG tablet [Pharmacy Med Name: L-THYROXINE (SYNTHROID) TABS 137MCG] 90 tablet 3     Sig: TAKE 1 TABLET DAILY       Thyroid Protocol Failed - 4/20/2020 10:03 AM        Failed - Normal TSH on file in past 12 months     Recent Labs   Lab Test 10/04/19  0940   TSH 4.75*              Passed - Patient is 12 years or older        Passed - Recent (12 mo) or future (30 days) visit within the authorizing provider's specialty     Patient has had an office visit with the authorizing provider or a provider within the authorizing providers department within the previous 12 mos or has a future within next 30 days. See \"Patient Info\" tab in inbasket, or \"Choose Columns\" in Meds & Orders section of the refill encounter.              Passed - Medication is active on med list        Passed - No active pregnancy on record     If patient is pregnant or has had a positive pregnancy test, please check TSH.          Passed - No positive pregnancy test in past 12 months     If patient is pregnant or has had a positive pregnancy test, please check TSH.             Last Written Prescription Date:  10/17/2019  Last Fill Quantity: 90,  # refills: 1   Last office visit: 1/31/2020 with prescribing provider:  Gabriel   Future Office Visit:      "

## 2020-04-20 NOTE — TELEPHONE ENCOUNTER
Patient Result Comments     Viewed by Marilee Marks on 10/4/2019  3:47 PM   Written by Tomasa Rios MD on 10/4/2019  3:44 PM   Increase levothyroxine to 137 mcg daily and recheck in 2 months.       [employer] message sent to pt today that she needs this lab work done now.    Tess BOYCE RN, BSN

## 2020-04-23 ENCOUNTER — TRANSFERRED RECORDS (OUTPATIENT)
Dept: HEALTH INFORMATION MANAGEMENT | Facility: CLINIC | Age: 75
End: 2020-04-23

## 2020-04-27 DIAGNOSIS — E03.9 HYPOTHYROIDISM, UNSPECIFIED TYPE: ICD-10-CM

## 2020-04-27 DIAGNOSIS — I10 ESSENTIAL HYPERTENSION: ICD-10-CM

## 2020-04-27 LAB
ANION GAP SERPL CALCULATED.3IONS-SCNC: 9 MMOL/L (ref 3–14)
BUN SERPL-MCNC: 27 MG/DL (ref 7–30)
CALCIUM SERPL-MCNC: 9.8 MG/DL (ref 8.5–10.1)
CHLORIDE SERPL-SCNC: 105 MMOL/L (ref 94–109)
CO2 SERPL-SCNC: 22 MMOL/L (ref 20–32)
CREAT SERPL-MCNC: 0.84 MG/DL (ref 0.52–1.04)
GFR SERPL CREATININE-BSD FRML MDRD: 68 ML/MIN/{1.73_M2}
GLUCOSE SERPL-MCNC: 153 MG/DL (ref 70–99)
POTASSIUM SERPL-SCNC: 3.5 MMOL/L (ref 3.4–5.3)
SODIUM SERPL-SCNC: 136 MMOL/L (ref 133–144)

## 2020-04-27 PROCEDURE — 36415 COLL VENOUS BLD VENIPUNCTURE: CPT | Performed by: FAMILY MEDICINE

## 2020-04-27 PROCEDURE — 84443 ASSAY THYROID STIM HORMONE: CPT | Performed by: FAMILY MEDICINE

## 2020-04-27 PROCEDURE — 80048 BASIC METABOLIC PNL TOTAL CA: CPT | Performed by: FAMILY MEDICINE

## 2020-04-27 PROCEDURE — 84439 ASSAY OF FREE THYROXINE: CPT | Performed by: FAMILY MEDICINE

## 2020-04-29 ENCOUNTER — MYC MEDICAL ADVICE (OUTPATIENT)
Dept: FAMILY MEDICINE | Facility: CLINIC | Age: 75
End: 2020-04-29

## 2020-04-29 DIAGNOSIS — E03.9 HYPOTHYROIDISM, UNSPECIFIED TYPE: Primary | ICD-10-CM

## 2020-04-29 LAB
T4 FREE SERPL-MCNC: 0.92 NG/DL (ref 0.76–1.46)
TSH SERPL DL<=0.005 MIU/L-ACNC: <0.01 MU/L (ref 0.4–4)

## 2020-04-29 NOTE — RESULT ENCOUNTER NOTE
"TSH is now low - meaning you're getting too much levothyroxine.  I would cut your pill in half one day a week.  Essentially the higher dose can make you \"hyperthyroid\" with too much medication and that can cause heart problems, etc.    Tomasa Rios M.D.  "

## 2020-04-29 NOTE — TELEPHONE ENCOUNTER
Not sure how basic metabolic panel was ordered?  She was advised to come in for thyroid labs per refill protocol (but no TSH/T4 was ordered by RN).  I called [place] [facility] and they will add on the thyroid labs to blood already collected.  In any case patient states that she was NOT fasting.  BRIJESH.    Ileana Tan RN

## 2020-04-29 NOTE — RESULT ENCOUNTER NOTE
Elevated glucose. Was this a fasting specimen?    Kidney function and electrolytes otherwise normal.    Tomasa Rios M.D.

## 2020-05-26 ENCOUNTER — TRANSFERRED RECORDS (OUTPATIENT)
Dept: HEALTH INFORMATION MANAGEMENT | Facility: CLINIC | Age: 75
End: 2020-05-26

## 2020-06-15 ENCOUNTER — OFFICE VISIT (OUTPATIENT)
Dept: FAMILY MEDICINE | Facility: CLINIC | Age: 75
End: 2020-06-15
Payer: COMMERCIAL

## 2020-06-15 VITALS
OXYGEN SATURATION: 100 % | HEIGHT: 64 IN | RESPIRATION RATE: 18 BRPM | TEMPERATURE: 97.5 F | BODY MASS INDEX: 31.58 KG/M2 | HEART RATE: 63 BPM | SYSTOLIC BLOOD PRESSURE: 132 MMHG | WEIGHT: 185 LBS | DIASTOLIC BLOOD PRESSURE: 68 MMHG

## 2020-06-15 DIAGNOSIS — M72.0 DUPUYTREN'S CONTRACTURE OF LEFT HAND: Primary | ICD-10-CM

## 2020-06-15 PROCEDURE — 99213 OFFICE O/P EST LOW 20 MIN: CPT | Performed by: FAMILY MEDICINE

## 2020-06-15 ASSESSMENT — MIFFLIN-ST. JEOR: SCORE: 1324.15

## 2020-06-15 ASSESSMENT — PAIN SCALES - GENERAL: PAINLEVEL: MODERATE PAIN (5)

## 2020-06-15 NOTE — PROGRESS NOTES
"Subjective     Marilee Marks is a 74 year old female who presents to clinic today for the following health issues:    HPI   Chief Complaint   Patient presents with     Hand Problem     Patient is here due to left hand still becoming swollen and she has been going to hand therapy and its not working. Patient fell on cruise in Jan and since she is still having pain at a 5/10.      Patient sustained a left radius fracture in January.  She is still dealing with some pain and more recently, she's had some contractures of the left palm, unable to fully extend her fingers.  She is seeing Dr. Jacobo through [facility] (wrist/hand specialist). She is also seeing a hand therapist regularly. She denies significant pain but is more bothered by lack of function.        Reviewed and updated as needed this visit by Provider         Review of Systems   Constitutional, HEENT, cardiovascular, pulmonary, gi and gu systems are negative, except as otherwise noted.      Objective    /68   Pulse 63   Temp 97.5  F (36.4  C) (Tympanic)   Resp 18   Ht 1.626 m (5' 4\")   Wt 83.9 kg (185 lb)   SpO2 100%   BMI 31.76 kg/m    Body mass index is 31.76 kg/m .  Physical Exam   GENERAL APPEARANCE: healthy, alert and no distress  ORTHO: Hand/Finger Exam: Inspection:swelling of all fingers.  Mild edema  Tender: palm - there is contracture of the palm and she is unable to fully extend fingers    Range of Motion lacks about 20-30 degrees of full extension of fingers.  Unable to fully pronate wrist                  ASSESSMENT/PLAN:      ICD-10-CM    1. Dupuytren's contracture of left hand  M72.0      Unfortunately I think she's developed some contracture of the palmar fascia.  I would like her to continue to see Dr. Jacobo and her hand therapist.  She is in agreement.     Tomasa Rios M.D.      Patient Instructions       Our Clinic hours are:  Mondays    7:20 am - 7 pm  Tues -  Fri  7:20 am - 5 pm    Clinic Phone: [phone]    The " clinic lab opens at 7:30 am Mon - Fri and appointments are required.    [facility]. [phone]  Monday  8 am - 7pm  Tues - Fri 8 am - 5:30 pm         Patient Education     Understanding Dupuytren Contracture    Dupuytren contracture is a disease that occurs when the fibrous tissue beneath the skin of the palm and fingers thickens. This tissue is called the palmar fascia. If the disease gets worse, it can cause small hard knots (nodules) to form under the skin. Hard bands (cords) of tissue can also form. Over time, your fingers may curl and bend toward the palm. This effect is called contracture. This can make it hard to straighten your fingers.       What causes Dupuytren contracture?  Doctors don t know the exact cause of Dupuytren contracture. It may run in families, especially those of Northern  descent. The disease is more common in adults older than 50. It is also more common in men than in women.  Symptoms of Dupuytren contracture  Symptoms for Dupuytren contracture tend to occur slowly. They may include:    Pitted, dimpled, or  puckered  skin over the palm    Hard lumps that form on the palm. Sometimes, the lumps are tender at first.    Scar-like bands that form across the palm    Fingers that bend toward the palm. The ring and little fingers are most often affected.    You are not able to place the palm flat on a surface    You have trouble holding or grasping objects    Hand pain (less common)  Treating Dupuytren contracture  Treatment for Dupuytren contracture depends on how serious your symptoms are. Treatment can t cure the disease. But it can help reduce symptoms or make it easier to move your fingers. Treatments may include:    Shots of medicine. These usually consist of corticosteroids (a powerful anti-inflammatory medication) they may help relieve symptoms and reduce the size of nodules.    Enzyme shots. These may be used to break up the thickened tissue. This helps  reduce contracture. It may allow your fingers to straighten again.    Needle aponeurotomy. Shallow needle punctures are made through the skin to break up the thickened issue. This helps reduce contracture. It may allow your fingers to straighten again.    Hand exercises. These are often prescribed along with other treatments. They may help stretch and improve the range of motion in the hand and fingers.    Surgery. Various surgical techniques may be used to remove some of the thickened tissue in the palm. This helps reduce contracture. It also improves normal finger motion and hand function.  Possible complications of Dupuytren contracture  In some people, the contracture in the hand may get worse over time. This can lead to joint stiffness, deformity, and reduced function of the hand.  When to call your healthcare provider  Call your healthcare provider right away if you have any of these:    Fever of 100.4 F (38 C) or higher, chills, oras directed    Symptoms that don t get better with treatment, or get worse    New symptoms  Date Last Reviewed: 3/10/2016    6391-8420 [employer]. [address] [place] [address]. All rights reserved. This information is not intended as a substitute for professional medical care. Always follow your healthcare professional's instructions.           Patient Education     Treating Dupuytren Contracture    Dupuytren contracture may require no treatment if your symptoms are mild. If your symptoms are more severe or they worsen, you may need treatment. Steroid or enyzme injections can be done to weaken and disrupt the cords. Another option is surgery. Surgery may be performed to remove the affected tissue. Physical therapy is often required afterwards to get the best results. Recovery may take several months. Your healthcare provider may suggest surgery if use of your hand is sharply limited.  Your surgery experience  Surgery removes some of the palmar fascia.  This can take a few hours. You may be awake, but drowsy, during surgery. Or, you may have general anesthesia (where you  sleep ). Your healthcare provider may use a zigzag-shaped incision to reach the fascia. A zigzag allows better healing and finger motion. When surgery is complete, part of your incision may be left open to help drainage. As you heal, it will close on its own. A thick bandage or cast will be placed over your hand and forearm. You most likely will go home the day of surgery.  After surgery  In the first few days, keep your hand elevated above your heart to reduce swelling and pain. And take any pain pills your healthcare provider prescribed. If you re asked to use ice, follow your healthcare provider s advice. In about a week, your stitches will be removed. You then may need to wear a splint. Soon, you ll start hand therapy and exercises that can help you heal.  Risks and complications  Your healthcare provider will give you details about the possible risks and complications of surgery. These may include:    Stiff fingers    Thick scarring on palm    Numbness in hand    Swelling around finger joints    Impaired blood flow to hand    Long-term pain or permanent stiffness in hand (rare)    Infection  Date Last Reviewed: 2/1/2018 2000-2019 [employer]. [address] [place] [address], [place] [address]. All rights reserved. This information is not intended as a substitute for professional medical care. Always follow your healthcare professional's instructions.

## 2020-06-15 NOTE — PATIENT INSTRUCTIONS
Our Clinic hours are:  Mondays    7:20 am - 7 pm  Tues - Fri  7:20 am - 5 pm    Clinic Phone: [phone]    The clinic lab opens at 7:30 am Mon - Fri and appointments are required.    [facility]. [phone]  Monday  8 am - 7pm  Tues - Fri 8 am - 5:30 pm         Patient Education     Understanding Dupuytren Contracture    Dupuytren contracture is a disease that occurs when the fibrous tissue beneath the skin of the palm and fingers thickens. This tissue is called the palmar fascia. If the disease gets worse, it can cause small hard knots (nodules) to form under the skin. Hard bands (cords) of tissue can also form. Over time, your fingers may curl and bend toward the palm. This effect is called contracture. This can make it hard to straighten your fingers.       What causes Dupuytren contracture?  Doctors don t know the exact cause of Dupuytren contracture. It may run in families, especially those of Northern  descent. The disease is more common in adults older than 50. It is also more common in men than in women.  Symptoms of Dupuytren contracture  Symptoms for Dupuytren contracture tend to occur slowly. They may include:    Pitted, dimpled, or  puckered  skin over the palm    Hard lumps that form on the palm. Sometimes, the lumps are tender at first.    Scar-like bands that form across the palm    Fingers that bend toward the palm. The ring and little fingers are most often affected.    You are not able to place the palm flat on a surface    You have trouble holding or grasping objects    Hand pain (less common)  Treating Dupuytren contracture  Treatment for Dupuytren contracture depends on how serious your symptoms are. Treatment can t cure the disease. But it can help reduce symptoms or make it easier to move your fingers. Treatments may include:    Shots of medicine. These usually consist of corticosteroids (a powerful anti-inflammatory medication) they may help relieve  symptoms and reduce the size of nodules.    Enzyme shots. These may be used to break up the thickened tissue. This helps reduce contracture. It may allow your fingers to straighten again.    Needle aponeurotomy. Shallow needle punctures are made through the skin to break up the thickened issue. This helps reduce contracture. It may allow your fingers to straighten again.    Hand exercises. These are often prescribed along with other treatments. They may help stretch and improve the range of motion in the hand and fingers.    Surgery. Various surgical techniques may be used to remove some of the thickened tissue in the palm. This helps reduce contracture. It also improves normal finger motion and hand function.  Possible complications of Dupuytren contracture  In some people, the contracture in the hand may get worse over time. This can lead to joint stiffness, deformity, and reduced function of the hand.  When to call your healthcare provider  Call your healthcare provider right away if you have any of these:    Fever of 100.4 F (38 C) or higher, chills, oras directed    Symptoms that don t get better with treatment, or get worse    New symptoms  Date Last Reviewed: 3/10/2016    7503-8373 [employer]. [address] [place] [address]. All rights reserved. This information is not intended as a substitute for professional medical care. Always follow your healthcare professional's instructions.           Patient Education     Treating Dupuytren Contracture    Dupuytren contracture may require no treatment if your symptoms are mild. If your symptoms are more severe or they worsen, you may need treatment. Steroid or enyzme injections can be done to weaken and disrupt the cords. Another option is surgery. Surgery may be performed to remove the affected tissue. Physical therapy is often required afterwards to get the best results. Recovery may take several months. Your healthcare provider may suggest  surgery if use of your hand is sharply limited.  Your surgery experience  Surgery removes some of the palmar fascia. This can take a few hours. You may be awake, but drowsy, during surgery. Or, you may have general anesthesia (where you  sleep ). Your healthcare provider may use a zigzag-shaped incision to reach the fascia. A zigzag allows better healing and finger motion. When surgery is complete, part of your incision may be left open to help drainage. As you heal, it will close on its own. A thick bandage or cast will be placed over your hand and forearm. You most likely will go home the day of surgery.  After surgery  In the first few days, keep your hand elevated above your heart to reduce swelling and pain. And take any pain pills your healthcare provider prescribed. If you re asked to use ice, follow your healthcare provider s advice. In about a week, your stitches will be removed. You then may need to wear a splint. Soon, you ll start hand therapy and exercises that can help you heal.  Risks and complications  Your healthcare provider will give you details about the possible risks and complications of surgery. These may include:    Stiff fingers    Thick scarring on palm    Numbness in hand    Swelling around finger joints    Impaired blood flow to hand    Long-term pain or permanent stiffness in hand (rare)    Infection  Date Last Reviewed: 2/1/2018 2000-2019 [employer]. [address] [place] [address], [place] [address]. All rights reserved. This information is not intended as a substitute for professional medical care. Always follow your healthcare professional's instructions.

## 2020-06-16 ENCOUNTER — TRANSFERRED RECORDS (OUTPATIENT)
Dept: HEALTH INFORMATION MANAGEMENT | Facility: CLINIC | Age: 75
End: 2020-06-16

## 2020-09-09 RX ORDER — TOPIRAMATE 50 MG/1
TABLET, FILM COATED ORAL
Qty: 180 TABLET | Refills: 3 | Status: SHIPPED | OUTPATIENT
Start: 2020-09-09 | End: 2021-08-30

## 2020-09-09 NOTE — TELEPHONE ENCOUNTER
"Requested Prescriptions   Pending Prescriptions Disp Refills     topiramate (TOPAMAX) 50 MG tablet [Pharmacy Med Name: TOPIRAMATE TABS 50MG] 180 tablet 3     Sig: TAKE 1 TABLET TWICE A DAY       Anti-Seizure Meds Protocol  Failed - 9/6/2020 10:01 AM        Failed - Review Authorizing provider's last note.      Refer to last progress notes: confirm request is for original authorizing provider (cannot be through other providers).          Passed - Recent (12 mo) or future (30 days) visit within the authorizing provider's specialty     Patient has had an office visit with the authorizing provider or a provider within the authorizing providers department within the previous 12 mos or has a future within next 30 days. See \"Patient Info\" tab in inbasket, or \"Choose Columns\" in Meds & Orders section of the refill encounter.              Passed - Normal CBC on file in past 26 months     Recent Labs   Lab Test 11/26/19  0936   WBC 4.8   RBC 4.15   HGB 12.5   HCT 40.0                    Passed - Normal serum creatinine on file in past 26 months     Recent Labs   Lab Test 04/27/20  1419   CR 0.84       Ok to refill medication if creatinine is low          Passed - Normal ALT or AST on file in past 26 months     Recent Labs   Lab Test 11/08/19  0915   ALT 32     Recent Labs   Lab Test 11/08/19  0915   AST 17             Passed - Normal platelet count on file in past 26 months     Recent Labs   Lab Test 11/26/19  0936                  Passed - Medication is active on med list        Passed - No active pregnancy on record        Passed - No positive pregnancy test in last 12 months             "

## 2020-09-23 ENCOUNTER — OFFICE VISIT (OUTPATIENT)
Dept: FAMILY MEDICINE | Facility: CLINIC | Age: 75
End: 2020-09-23
Payer: COMMERCIAL

## 2020-09-23 VITALS
SYSTOLIC BLOOD PRESSURE: 128 MMHG | WEIGHT: 187 LBS | OXYGEN SATURATION: 98 % | DIASTOLIC BLOOD PRESSURE: 56 MMHG | HEART RATE: 67 BPM | RESPIRATION RATE: 16 BRPM | TEMPERATURE: 98.3 F | HEIGHT: 64 IN | BODY MASS INDEX: 31.92 KG/M2

## 2020-09-23 DIAGNOSIS — M19.91 PRIMARY OSTEOARTHRITIS, UNSPECIFIED SITE: Primary | ICD-10-CM

## 2020-09-23 DIAGNOSIS — I10 BENIGN ESSENTIAL HYPERTENSION: ICD-10-CM

## 2020-09-23 DIAGNOSIS — M72.0 DUPUYTREN'S CONTRACTURE OF LEFT HAND: ICD-10-CM

## 2020-09-23 DIAGNOSIS — M16.11 PRIMARY OSTEOARTHRITIS OF RIGHT HIP: ICD-10-CM

## 2020-09-23 DIAGNOSIS — Z13.6 CARDIOVASCULAR SCREENING; LDL GOAL LESS THAN 130: ICD-10-CM

## 2020-09-23 DIAGNOSIS — Z23 NEED FOR PROPHYLACTIC VACCINATION AND INOCULATION AGAINST INFLUENZA: ICD-10-CM

## 2020-09-23 DIAGNOSIS — E87.1 HYPONATREMIA: ICD-10-CM

## 2020-09-23 DIAGNOSIS — E03.9 HYPOTHYROIDISM, UNSPECIFIED TYPE: ICD-10-CM

## 2020-09-23 DIAGNOSIS — R26.89 BALANCE PROBLEMS: ICD-10-CM

## 2020-09-23 LAB
ANION GAP SERPL CALCULATED.3IONS-SCNC: 6 MMOL/L (ref 3–14)
BUN SERPL-MCNC: 23 MG/DL (ref 7–30)
CALCIUM SERPL-MCNC: 10.1 MG/DL (ref 8.5–10.1)
CHLORIDE SERPL-SCNC: 100 MMOL/L (ref 94–109)
CHOLEST SERPL-MCNC: 194 MG/DL
CO2 SERPL-SCNC: 24 MMOL/L (ref 20–32)
CREAT SERPL-MCNC: 0.84 MG/DL (ref 0.52–1.04)
CRP SERPL-MCNC: <2.9 MG/L (ref 0–8)
GFR SERPL CREATININE-BSD FRML MDRD: 68 ML/MIN/{1.73_M2}
GLUCOSE SERPL-MCNC: 96 MG/DL (ref 70–99)
HDLC SERPL-MCNC: 79 MG/DL
LDLC SERPL CALC-MCNC: 102 MG/DL
NONHDLC SERPL-MCNC: 115 MG/DL
POTASSIUM SERPL-SCNC: 4.3 MMOL/L (ref 3.4–5.3)
SODIUM SERPL-SCNC: 130 MMOL/L (ref 133–144)
T4 FREE SERPL-MCNC: 1.11 NG/DL (ref 0.76–1.46)
TRIGL SERPL-MCNC: 67 MG/DL
TSH SERPL DL<=0.005 MIU/L-ACNC: 0.06 MU/L (ref 0.4–4)

## 2020-09-23 PROCEDURE — G0008 ADMIN INFLUENZA VIRUS VAC: HCPCS | Performed by: FAMILY MEDICINE

## 2020-09-23 PROCEDURE — 86140 C-REACTIVE PROTEIN: CPT | Performed by: FAMILY MEDICINE

## 2020-09-23 PROCEDURE — 80061 LIPID PANEL: CPT | Performed by: FAMILY MEDICINE

## 2020-09-23 PROCEDURE — 86431 RHEUMATOID FACTOR QUANT: CPT | Performed by: FAMILY MEDICINE

## 2020-09-23 PROCEDURE — 36415 COLL VENOUS BLD VENIPUNCTURE: CPT | Performed by: FAMILY MEDICINE

## 2020-09-23 PROCEDURE — 84443 ASSAY THYROID STIM HORMONE: CPT | Performed by: FAMILY MEDICINE

## 2020-09-23 PROCEDURE — 84439 ASSAY OF FREE THYROXINE: CPT | Performed by: FAMILY MEDICINE

## 2020-09-23 PROCEDURE — 80048 BASIC METABOLIC PNL TOTAL CA: CPT | Performed by: FAMILY MEDICINE

## 2020-09-23 PROCEDURE — 90662 IIV NO PRSV INCREASED AG IM: CPT | Performed by: FAMILY MEDICINE

## 2020-09-23 PROCEDURE — 99214 OFFICE O/P EST MOD 30 MIN: CPT | Mod: 25 | Performed by: FAMILY MEDICINE

## 2020-09-23 RX ORDER — LEVOTHYROXINE SODIUM 137 UG/1
137 TABLET ORAL DAILY
Qty: 90 TABLET | Refills: 3 | Status: SHIPPED | OUTPATIENT
Start: 2020-09-23 | End: 2020-09-24 | Stop reason: DRUGHIGH

## 2020-09-23 RX ORDER — CELECOXIB 200 MG/1
200 CAPSULE ORAL DAILY
Qty: 90 CAPSULE | Refills: 3 | Status: SHIPPED | OUTPATIENT
Start: 2020-09-23 | End: 2021-08-30

## 2020-09-23 ASSESSMENT — MIFFLIN-ST. JEOR: SCORE: 1333.23

## 2020-09-23 ASSESSMENT — PAIN SCALES - GENERAL: PAINLEVEL: EXTREME PAIN (8)

## 2020-09-23 NOTE — PROGRESS NOTES
Subjective     Marilee Marks is a 74 year old female who presents to clinic today for the following health issues:    HPI   Chief Complaint   Patient presents with     Arthritis     Patient is having trouble with pain throughout her body. Patient is having more crabbiness and falls due to pain. Patient now is waking up with pain in her eyes. Patient rates pain at a 8/10 and is taking ibu 800 for discomfort,      Flu Shot     Health Maintenance     mammogram         Hypothyroidism Follow-up      Since last visit, patient describes the following symptoms: Weight stable, no hair loss, no skin changes, no constipation, no loose stools      How many servings of fruits and vegetables do you eat daily?  2-3    On average, how many sweetened beverages do you drink each day (Examples: soda, juice, sweet tea, etc.  Do NOT count diet or artificially sweetened beverages)?   0    How many days per week do you exercise enough to make your heart beat faster? 3 or less    How many minutes a day do you exercise enough to make your heart beat faster? active    How many days per week do you miss taking your medication? 0    Has been taking both celebrex 200 mg daily AND taking ibuprofen 800 mg three times daily.  The ibuprofen needs to stop.  She should take acetaminophen 1000 mg three times daily     Patient notices more balance problems, refuses PT.  She has generalized arthritic pain, wondering what more can be done, doesn't want to do therapy.  Has a hot tub but it's broken.    Did hand therapy for Dupuytren's contracture of the left hand.  Has seen specialist.   Doesn't want to continue any therapy for that either.    She has dry eyes or pain in her eyes at night, has been getting up and taking benadryl.  I did tell her to stop the benadryl due to increased risk of dementia.         Review of Systems   Constitutional, HEENT, cardiovascular, pulmonary, gi and gu systems are negative, except as otherwise noted.     "  Objective    /56   Pulse 67   Temp 98.3  F (36.8  C) (Tympanic)   Resp 16   Ht 1.626 m (5' 4\")   Wt 84.8 kg (187 lb)   SpO2 98%   Breastfeeding No   BMI 32.10 kg/m    Body mass index is 32.1 kg/m .  Physical Exam   GENERAL: healthy, alert and no distress  NECK: no adenopathy, no asymmetry, masses, or scars and thyroid normal to palpation  RESP: lungs clear to auscultation - no rales, rhonchi or wheezes  CV: regular rate and rhythm, normal S1 S2, no S3 or S4, no murmur, click or rub, no peripheral edema and peripheral pulses strong  ABDOMEN: soft, nontender, no hepatosplenomegaly, no masses and bowel sounds normal  MS: left hand palmar fibrosis with contractures  SKIN: senile purpura of the wrist            Assessment & Plan     Primary osteoarthritis, unspecified site     - celecoxib (CELEBREX) 200 MG capsule; Take 1 capsule (200 mg) by mouth daily  - Rheumatoid factor  - CRP, inflammation    Primary osteoarthritis of right hip       Hypothyroidism, unspecified type     - levothyroxine (SYNTHROID/LEVOTHROID) 137 MCG tablet; Take 1 tablet (137 mcg) by mouth daily  - TSH with free T4 reflex    Benign essential hypertension   well controlled  Continue hydrochlorothiazide    - Basic metabolic panel    CARDIOVASCULAR SCREENING; LDL GOAL LESS THAN 130     - Lipid panel reflex to direct LDL Non-fasting    Dupuytren's contracture of left hand       Balance problems  Refuses PT    Need for prophylactic vaccination and inoculation against influenza     - FLUZONE HIGH DOSE 65+  [66429]  - Vaccine Administration, Initial [10715]     BMI:   Estimated body mass index is 32.1 kg/m  as calculated from the following:    Height as of this encounter: 1.626 m (5' 4\").    Weight as of this encounter: 84.8 kg (187 lb).               No follow-ups on file.    Tomasa Rios MD  [facility]    "

## 2020-09-24 LAB — RHEUMATOID FACT SER NEPH-ACNC: <7 IU/ML (ref 0–20)

## 2020-09-24 RX ORDER — LEVOTHYROXINE SODIUM 125 UG/1
125 TABLET ORAL DAILY
Qty: 90 TABLET | Refills: 1 | Status: SHIPPED | OUTPATIENT
Start: 2020-09-24 | End: 2021-06-23

## 2020-09-24 NOTE — RESULT ENCOUNTER NOTE
Your TSH is low, we need to decrease the levothyroxine dose to 125 mcg.    New prescription sent to pharmacy.    Cholesterol is good.  CRP - looks for inflammation is normal.     Sodium is low.  This is likely due to hydrochlorothiazide.  I would recommend you recheck sodium in 2 weeks.  Keep an eye on your blood pressure. Let me know if >140/90.    Tomasa Rios M.D.

## 2020-10-01 ENCOUNTER — MYC MEDICAL ADVICE (OUTPATIENT)
Dept: FAMILY MEDICINE | Facility: CLINIC | Age: 75
End: 2020-10-01

## 2020-10-02 NOTE — TELEPHONE ENCOUNTER
"Called [facility] for patient.  Spoke with representative who states that the celecoxib is under \"doctor's review\".  Spoke with pharmacist.  She wants to be sure that patient does not take the celebrex with the ibuprofen 800mg tablets.  Will remind patient to discontinue ibuprofen while taking celebrex.    Ileana Tan RN    "

## 2020-10-07 ENCOUNTER — ANCILLARY PROCEDURE (OUTPATIENT)
Dept: MAMMOGRAPHY | Facility: CLINIC | Age: 75
End: 2020-10-07
Attending: FAMILY MEDICINE
Payer: COMMERCIAL

## 2020-10-07 DIAGNOSIS — Z12.31 VISIT FOR SCREENING MAMMOGRAM: ICD-10-CM

## 2020-10-07 PROCEDURE — 77067 SCR MAMMO BI INCL CAD: CPT | Performed by: RADIOLOGY

## 2020-11-14 ENCOUNTER — HEALTH MAINTENANCE LETTER (OUTPATIENT)
Age: 75
End: 2020-11-14

## 2021-01-27 ENCOUNTER — MYC MEDICAL ADVICE (OUTPATIENT)
Dept: FAMILY MEDICINE | Facility: CLINIC | Age: 76
End: 2021-01-27

## 2021-01-28 ENCOUNTER — OFFICE VISIT (OUTPATIENT)
Dept: FAMILY MEDICINE | Facility: CLINIC | Age: 76
End: 2021-01-28
Payer: COMMERCIAL

## 2021-01-28 VITALS
TEMPERATURE: 98.6 F | HEART RATE: 71 BPM | RESPIRATION RATE: 16 BRPM | HEIGHT: 64 IN | DIASTOLIC BLOOD PRESSURE: 64 MMHG | WEIGHT: 189.8 LBS | OXYGEN SATURATION: 100 % | BODY MASS INDEX: 32.4 KG/M2 | SYSTOLIC BLOOD PRESSURE: 148 MMHG

## 2021-01-28 DIAGNOSIS — S61.412A LACERATION OF LEFT HAND WITHOUT FOREIGN BODY, INITIAL ENCOUNTER: ICD-10-CM

## 2021-01-28 DIAGNOSIS — G47.33 OSA (OBSTRUCTIVE SLEEP APNEA): ICD-10-CM

## 2021-01-28 DIAGNOSIS — M24.542 CONTRACTURE OF HAND JOINT, LEFT: ICD-10-CM

## 2021-01-28 DIAGNOSIS — Z86.718 PERSONAL HISTORY OF DVT (DEEP VEIN THROMBOSIS): ICD-10-CM

## 2021-01-28 DIAGNOSIS — E03.9 HYPOTHYROIDISM, UNSPECIFIED TYPE: ICD-10-CM

## 2021-01-28 DIAGNOSIS — I10 ESSENTIAL HYPERTENSION: ICD-10-CM

## 2021-01-28 DIAGNOSIS — Z01.818 PREOP GENERAL PHYSICAL EXAM: Primary | ICD-10-CM

## 2021-01-28 PROBLEM — R03.0 ELEVATED BLOOD PRESSURE READING WITHOUT DIAGNOSIS OF HYPERTENSION: Status: RESOLVED | Noted: 2017-09-25 | Resolved: 2021-01-28

## 2021-01-28 PROBLEM — E66.01 MORBID OBESITY (H): Status: RESOLVED | Noted: 2018-10-15 | Resolved: 2021-01-28

## 2021-01-28 LAB
ANION GAP SERPL CALCULATED.3IONS-SCNC: 4 MMOL/L (ref 3–14)
BUN SERPL-MCNC: 22 MG/DL (ref 7–30)
CALCIUM SERPL-MCNC: 10.2 MG/DL (ref 8.5–10.1)
CHLORIDE SERPL-SCNC: 109 MMOL/L (ref 94–109)
CO2 SERPL-SCNC: 23 MMOL/L (ref 20–32)
CREAT SERPL-MCNC: 0.91 MG/DL (ref 0.52–1.04)
GFR SERPL CREATININE-BSD FRML MDRD: 61 ML/MIN/{1.73_M2}
GLUCOSE SERPL-MCNC: 87 MG/DL (ref 70–99)
HGB BLD-MCNC: 11.7 G/DL (ref 11.7–15.7)
POTASSIUM SERPL-SCNC: 4.2 MMOL/L (ref 3.4–5.3)
SODIUM SERPL-SCNC: 136 MMOL/L (ref 133–144)
TSH SERPL DL<=0.005 MIU/L-ACNC: 3.01 MU/L (ref 0.4–4)

## 2021-01-28 PROCEDURE — 99214 OFFICE O/P EST MOD 30 MIN: CPT | Performed by: NURSE PRACTITIONER

## 2021-01-28 PROCEDURE — 84443 ASSAY THYROID STIM HORMONE: CPT | Performed by: NURSE PRACTITIONER

## 2021-01-28 PROCEDURE — 85018 HEMOGLOBIN: CPT | Performed by: NURSE PRACTITIONER

## 2021-01-28 PROCEDURE — 36415 COLL VENOUS BLD VENIPUNCTURE: CPT | Performed by: NURSE PRACTITIONER

## 2021-01-28 PROCEDURE — 80048 BASIC METABOLIC PNL TOTAL CA: CPT | Performed by: NURSE PRACTITIONER

## 2021-01-28 RX ORDER — TRIAMTERENE AND HYDROCHLOROTHIAZIDE 37.5; 25 MG/1; MG/1
1 CAPSULE ORAL DAILY
Qty: 90 CAPSULE | Refills: 0 | Status: SHIPPED | OUTPATIENT
Start: 2021-01-28 | End: 2021-01-29 | Stop reason: ALTCHOICE

## 2021-01-28 ASSESSMENT — MIFFLIN-ST. JEOR: SCORE: 1340.93

## 2021-01-28 NOTE — PROGRESS NOTES
[facility]  [id number] [address] [facility] [address]  Phone: [phone]  Primary Provider: Tomasa Rios  Pre-op Performing Provider: CHAI MCKENZIE    PREOPERATIVE EVALUATION:  Today's date: 1/28/2021    Marilee Marks is a 75 year old female who presents for a preoperative evaluation.    Surgical Information:  Surgery/Procedure: Left hand palmar Fasciectomy  Surgery Location: [facility]   Surgeon: Dr. Lopez  Surgery Date: 2/2/21  Time of Surgery: tbd  Where patient plans to recover: At home with family  Fax number for surgical facility: [phone]    Type of Anesthesia Anticipated: to be determined    Subjective     HPI related to upcoming procedure: Contracture of left hand after traumatic fall    Preop Questions 1/28/2021   1. Have you ever had a heart attack or stroke? No   2. Have you ever had surgery on your heart or blood vessels, such as a stent placement, a coronary artery bypass, or surgery on an artery in your head, neck, heart, or legs? No   3. Do you have chest pain with activity? No   4. Do you have a history of  heart failure? No   5. Do you currently have a cold, bronchitis or symptoms of other infection? No   6. Do you have a cough, shortness of breath, or wheezing? No   7. Do you or anyone in your family have previous history of blood clots? YES- After knee surgery in 2014, wasn't taken her post op Xarelto as prescribed for prevention and was on HRT at that time.   8. Do you or does anyone in your family have a serious bleeding problem such as prolonged bleeding following surgeries or cuts? No   9. Have you ever had problems with anemia or been told to take iron pills? No   10. Have you had any abnormal blood loss such as black, tarry or bloody stools, or abnormal vaginal bleeding? No   11. Have you ever had a blood transfusion? No   12. Are you willing to have a blood transfusion if it is medically needed before, during, or after your  surgery? Yes   13. Have you or any of your relatives ever had problems with anesthesia? No   14. Do you have sleep apnea, excessive snoring or daytime drowsiness? No   15. Do you have any artifical heart valves or other implanted medical devices like a pacemaker, defibrillator, or continuous glucose monitor? No   16. Do you have artificial joints? YES - R hip and knee   17. Are you allergic to latex? No       Health Care Directive:  Patient does not have a Health Care Directive or Living Will: Patient states has Advance Directive and will bring in a copy to clinic.    Preoperative Review of :   reviewed - no record of controlled substances prescribed.  \  PDMP Review       Value Time User    State PDMP site checked  Yes 1/28/2021  9:38 AM Arabella Parmar APRN CNP            Status of Chronic Conditions:  HYPERTENSION - Patient has longstanding history of HTN , currently denies any symptoms referable to elevated blood pressure. Specifically denies chest pain, palpitations, dyspnea, orthopnea, PND or peripheral edema. Blood pressure readings have not been in normal range. Current medication regimen is as listed below. Patient denies any side effects of medication.     HYPOTHYROIDISM - Patient has a longstanding history of chronic Hypothyroidism. Patient has been doing well, noting no tremor, insomnia, hair loss or changes in skin texture. Continues to take medications as directed, without adverse reactions or side effects. Last TSH   Lab Results   Component Value Date    TSH 0.06 (L) 09/23/2020   .        Review of Systems  CONSTITUTIONAL: NEGATIVE for fever, chills, change in weight  INTEGUMENTARY/SKIN: POSITIVE for superficial laceration  EYES: NEGATIVE for vision changes or irritation  ENT/MOUTH: NEGATIVE for ear, mouth and throat problems  RESP: NEGATIVE for significant cough or SOB  CV: NEGATIVE for chest pain, palpitations or peripheral edema  GI: NEGATIVE for nausea, abdominal pain, heartburn, or  change in bowel habits  : NEGATIVE for frequency, dysuria, or hematuria  MUSCULOSKELETAL: NEGATIVE for significant arthralgias or myalgia  NEURO: NEGATIVE for weakness, dizziness or paresthesias  ENDOCRINE: NEGATIVE for temperature intolerance, skin/hair changes  HEME: NEGATIVE for bleeding problems  PSYCHIATRIC: NEGATIVE for changes in mood or affect    Patient Active Problem List    Diagnosis Date Noted     Benign essential hypertension 11/26/2019     Priority: Medium     Personal history of DVT (deep vein thrombosis) 11/26/2019     Priority: Medium     Obesity (BMI 35.0-39.9) with comorbidity (H) 10/15/2018     Priority: Medium     Elevated blood pressure reading without diagnosis of hypertension 09/25/2017     Priority: Medium     Non morbid obesity 12/19/2016     Priority: Medium     Hypothyroidism, unspecified type 09/19/2016     Priority: Medium     Osteopenia 07/15/2015     Priority: Medium     Status post total knee replacement RIGHT 10/30/2014     Priority: Medium     Enchondroma of right femur 07/16/2014     Priority: Medium     BRUCE (obstructive sleep apnea) 04/19/2013     Priority: Medium     Mild without significant desaturations.  Currently not using CPAP.       Health Care Home 12/17/2012     Priority: Medium     Susan Larsen, RN-PHN  FPA / [facility]   [phone]    DX V65.8 REPLACED WITH 911[address] HEALTH CARE HOME (04/08/2013)       Advanced directives, counseling/discussion 10/17/2011     Priority: Medium     Patient has completed an Advance/Health Care Directive (HCD), to bring in copy to be scanned into Epic.    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     Past Surgical History:   Procedure Laterality Date     ARTHROPLASTY KNEE Right 10/30/2014     "Procedure: ARTHROPLASTY KNEE;  Surgeon: Fabian Deras MD;  Location: [facility] OR     ARTHROSCOPY KNEE Right 8/20/2015    Procedure: ARTHROSCOPY KNEE;  Surgeon: Fabian Deras MD;  Location: [facility] OR     BREAST LUMPECTOMY, RT/LT  1986    LT non-ca     C/SECTION, CLASSICAL  1973     COLONOSCOPY  1/2007    repeat in 10 years     HC REVISION GASTROPLASTY,OBESITY, NON-OBDULIA RESTRICT DEVICE  1979     LAPAROSCOPIC HERNIORRHAPHY VENTRAL  11/21/2011    Procedure:LAPAROSCOPIC HERNIORRHAPHY VENTRAL; Laparoscopic Left Spigelian Herniorraphy With Mesh & Repair of Left Lower Quadrant Hernia With  Mesh; Surgeon:TODD PHAN; Location:[facility] OR     SURGICAL HISTORY OF -   7/27/2009    Left Heart Cath, Left Ventriculography, Coronary Angiogram, Closure [place], [facility]     TUBAL LIGATION       Current Outpatient Medications   Medication Sig Dispense Refill     CALTRATE 600+D 600-400 MG-UNIT PO TABS Take one tablet by mouth every day       celecoxib (CELEBREX) 200 MG capsule Take 1 capsule (200 mg) by mouth daily 90 capsule 3     CENTRUM SILVER PO TABS Take one tablet by mouth every day  3     levothyroxine (SYNTHROID/LEVOTHROID) 125 MCG tablet Take 1 tablet (125 mcg) by mouth daily 90 tablet 1     topiramate (TOPAMAX) 50 MG tablet TAKE 1 TABLET TWICE A  tablet 3     triamterene-HCTZ (DYAZIDE) 37.5-25 MG capsule Take 1 capsule by mouth daily 90 capsule 0     VITAMIN D3 1000 UNIT PO CAPS 5000mg tablet,  1tab daily         Allergies   Allergen Reactions     Codeine Itching        Social History     Tobacco Use     Smoking status: Never Smoker     Smokeless tobacco: Never Used   Substance Use Topics     Alcohol use: Yes     Comment: OCCAS.       History   Drug Use No         Objective     BP (!) 148/64   Pulse 71   Temp 98.6  F (37  C) (Tympanic)   Resp 16   Ht 1.626 m (5' 4\")   Wt 86.1 kg (189 lb 12.8 oz)   SpO2 100%   BMI 32.58 kg/m      Physical Exam    GENERAL APPEARANCE: healthy, alert and no distress     EYES: " EOMI, PERRL     HENT: ear canals and TM's normal and nose and mouth without ulcers or lesions     NECK: no adenopathy, no asymmetry, masses, or scars and thyroid normal to palpation     RESP: lungs clear to auscultation - no rales, rhonchi or wheezes     CV: regular rates and rhythm, normal S1 S2, no S3 or S4 and no murmur, click or rub     ABDOMEN:  soft, nontender, no HSM or masses and bowel sounds normal     MS: extremities normal- no gross deformities noted, no evidence of inflammation in joints, FROM in all extremities.     SKIN: superficial abrasion to dorsal side of left hand, no erythema, edema, warmth or purulent drainage     NEURO: Normal strength and tone, sensory exam grossly normal, mentation intact and speech normal     PSYCH: mentation appears normal. and affect normal/bright     LYMPHATICS: No cervical adenopathy    Recent Labs   Lab Test 09/23/20  1537 04/27/20  1419 11/26/19  0936 11/08/19  0915   HGB  --   --  12.5 12.2   PLT  --   --  263 235   * 136 140 142   POTASSIUM 4.3 3.5 4.2 4.4   CR 0.84 0.84 0.84 0.71        Diagnostics:  Recent Results (from the past 48 hour(s))   Basic metabolic panel  (Ca, Cl, CO2, Creat, Gluc, K, Na, BUN)    Collection Time: 01/28/21  9:58 AM   Result Value Ref Range    Sodium 136 133 - 144 mmol/L    Potassium 4.2 3.4 - 5.3 mmol/L    Chloride 109 94 - 109 mmol/L    Carbon Dioxide 23 20 - 32 mmol/L    Anion Gap 4 3 - 14 mmol/L    Glucose 87 70 - 99 mg/dL    Urea Nitrogen 22 7 - 30 mg/dL    Creatinine 0.91 0.52 - 1.04 mg/dL    GFR Estimate 61 >60 mL/min/[1.73_m2]    GFR Estimate If Black 71 >60 mL/min/[1.73_m2]    Calcium 10.2 (H) 8.5 - 10.1 mg/dL   Hemoglobin    Collection Time: 01/28/21  9:58 AM   Result Value Ref Range    Hemoglobin 11.7 11.7 - 15.7 g/dL   TSH with free T4 reflex    Collection Time: 01/28/21  9:58 AM   Result Value Ref Range    TSH 3.01 0.40 - 4.00 mU/L      No EKG required, no history of coronary heart disease, significant arrhythmia,  peripheral arterial disease or other structural heart disease.    Revised Cardiac Risk Index (RCRI):  The patient has the following serious cardiovascular risks for perioperative complications:   - No serious cardiac risks = 0 points     RCRI Interpretation: 0 points: Class I (very low risk - 0.4% complication rate)         Assessment & Plan   The proposed surgical procedure is considered LOW risk.    Preop general physical exam    - Basic metabolic panel  (Ca, Cl, CO2, Creat, Gluc, K, Na, BUN)  - Hemoglobin    Contracture of hand joint, left      Essential hypertension  Uncontrolled, will discontinue hydrochlorothiazide and switch to Dyazide. Patient will monitor BP at home and report readings in 2-3 weeks via Mychart to PCP.  - triamterene-HCTZ (DYAZIDE) 37.5-25 MG capsule; Take 1 capsule by mouth daily    Hypothyroidism, unspecified type    - TSH with free T4 reflex    Laceration of left hand without foreign body, initial encounter  No signs of infection, instructed to use topical antibiotic ointment BID and notify us for signs of infection.     BRUCE (obstructive sleep apnea)    Risks and Recommendations:  The patient has the following additional risks and recommendations for perioperative complications:   - Hx of BRUCE- staff are advised to observe for oxygen desaturations    Medication Instructions:   - celecoxib (Celebrex): HOLD 3 days before surgery. May continue without modification for management of severe pain.     RECOMMENDATION:  APPROVAL GIVEN to proceed with proposed procedure, without further diagnostic evaluation.    Signed Electronically by: ALANIS Guerrero CNP    Copy of this evaluation report is provided to requesting physician.    [facility] Preop Guidelines    Revised Cardiac Risk Index

## 2021-01-28 NOTE — PATIENT INSTRUCTIONS

## 2021-01-29 ENCOUNTER — MYC MEDICAL ADVICE (OUTPATIENT)
Dept: FAMILY MEDICINE | Facility: CLINIC | Age: 76
End: 2021-01-29

## 2021-01-29 DIAGNOSIS — I10 ESSENTIAL HYPERTENSION: ICD-10-CM

## 2021-01-29 RX ORDER — HYDROCHLOROTHIAZIDE 12.5 MG/1
12.5 TABLET ORAL DAILY
Qty: 90 TABLET | Refills: 1 | Status: SHIPPED | OUTPATIENT
Start: 2021-01-29 | End: 2021-04-13 | Stop reason: ALTCHOICE

## 2021-01-29 NOTE — CONFIDENTIAL NOTE
2 part questions on MyChart.  I answered her question about the COVID vaccine, however she has the following question:    When I got home and was ready to put the new blood pressure medication in my weekly boxes, I realized I hadn't taken hydrochlorothiazide for the past 10 days.  OOPS.  So I would like to go back on that medication since it has worked in the past and not switch medications if you think that would be ok.       Ileana Tan RN

## 2021-02-10 ENCOUNTER — IMMUNIZATION (OUTPATIENT)
Dept: FAMILY MEDICINE | Facility: CLINIC | Age: 76
End: 2021-02-10
Payer: COMMERCIAL

## 2021-02-10 PROCEDURE — 91300 PR COVID VAC PFIZER DIL RECON 30 MCG/0.3 ML IM: CPT

## 2021-02-10 PROCEDURE — 0001A PR COVID VAC PFIZER DIL RECON 30 MCG/0.3 ML IM: CPT

## 2021-03-03 ENCOUNTER — IMMUNIZATION (OUTPATIENT)
Dept: FAMILY MEDICINE | Facility: CLINIC | Age: 76
End: 2021-03-03
Attending: FAMILY MEDICINE
Payer: COMMERCIAL

## 2021-03-03 PROCEDURE — 91300 PR COVID VAC PFIZER DIL RECON 30 MCG/0.3 ML IM: CPT

## 2021-03-03 PROCEDURE — 0002A PR COVID VAC PFIZER DIL RECON 30 MCG/0.3 ML IM: CPT

## 2021-04-13 ENCOUNTER — OFFICE VISIT (OUTPATIENT)
Dept: FAMILY MEDICINE | Facility: CLINIC | Age: 76
End: 2021-04-13
Payer: COMMERCIAL

## 2021-04-13 VITALS
HEIGHT: 64 IN | BODY MASS INDEX: 32.68 KG/M2 | HEART RATE: 67 BPM | SYSTOLIC BLOOD PRESSURE: 142 MMHG | WEIGHT: 191.4 LBS | OXYGEN SATURATION: 100 % | DIASTOLIC BLOOD PRESSURE: 64 MMHG | TEMPERATURE: 98.3 F | RESPIRATION RATE: 16 BRPM

## 2021-04-13 DIAGNOSIS — F43.23 ADJUSTMENT DISORDER WITH MIXED ANXIETY AND DEPRESSED MOOD: ICD-10-CM

## 2021-04-13 DIAGNOSIS — F32.1 MODERATE MAJOR DEPRESSION (H): Primary | ICD-10-CM

## 2021-04-13 DIAGNOSIS — I10 ESSENTIAL HYPERTENSION: ICD-10-CM

## 2021-04-13 PROCEDURE — 99214 OFFICE O/P EST MOD 30 MIN: CPT | Performed by: NURSE PRACTITIONER

## 2021-04-13 RX ORDER — BUPROPION HYDROCHLORIDE 150 MG/1
150 TABLET ORAL EVERY MORNING
Qty: 90 TABLET | Refills: 0 | Status: SHIPPED | OUTPATIENT
Start: 2021-04-13 | End: 2021-04-13

## 2021-04-13 RX ORDER — BUPROPION HYDROCHLORIDE 150 MG/1
150 TABLET ORAL EVERY MORNING
Qty: 90 TABLET | Refills: 0 | Status: SHIPPED | OUTPATIENT
Start: 2021-04-13 | End: 2021-06-24

## 2021-04-13 RX ORDER — TRIAMTERENE AND HYDROCHLOROTHIAZIDE 37.5; 25 MG/1; MG/1
1 CAPSULE ORAL DAILY
Qty: 90 CAPSULE | Refills: 0
Start: 2021-04-13 | End: 2021-10-06

## 2021-04-13 ASSESSMENT — ANXIETY QUESTIONNAIRES
1. FEELING NERVOUS, ANXIOUS, OR ON EDGE: MORE THAN HALF THE DAYS
5. BEING SO RESTLESS THAT IT IS HARD TO SIT STILL: NOT AT ALL
2. NOT BEING ABLE TO STOP OR CONTROL WORRYING: NEARLY EVERY DAY
6. BECOMING EASILY ANNOYED OR IRRITABLE: SEVERAL DAYS
3. WORRYING TOO MUCH ABOUT DIFFERENT THINGS: NEARLY EVERY DAY
7. FEELING AFRAID AS IF SOMETHING AWFUL MIGHT HAPPEN: NEARLY EVERY DAY
GAD7 TOTAL SCORE: 14

## 2021-04-13 ASSESSMENT — PATIENT HEALTH QUESTIONNAIRE - PHQ9
SUM OF ALL RESPONSES TO PHQ QUESTIONS 1-9: 12
5. POOR APPETITE OR OVEREATING: MORE THAN HALF THE DAYS

## 2021-04-13 ASSESSMENT — MIFFLIN-ST. JEOR: SCORE: 1348.18
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
## 2021-04-13 NOTE — PATIENT INSTRUCTIONS
Patient Education     Grief Reaction  Grief is the feeling that we all have when we lose someone or something that has been important in our life. Grief is an unavoidable and normal reaction to this loss. It can last from months to years. The amount of time depends on different factors. These include how close the person was to you and how much support you have through the grief process. Symptoms can be both physical and emotional.   Reactions to grief of a physical nature include:     Loss of appetite or overeating    Changes in weight    Trouble getting to sleep or staying asleep    Hair loss    Upset stomach, indigestion, heart burn, belly pain, cramping, diarrhea    Sense of trouble breathing    Trembling, shakiness  Reactions to grief of an emotional nature include:     Sadness    Anxiety    Feeling depressed or helpless    Difficulty concentrating    Detachment or withdrawal from those around you    Loss of interest in your normal life and work  Home care  Suggestions to care for yourself at home include the following:     Allow yourself to feel the pain of your loss. For some, this can be a key part of healing grief. Talk about your pain with others who understand. Share good memories that involve the person, pet, or possession you lost.    Take time for yourself. Make it a point to do things that you enjoy. This might be gardening, walking in nature, or going to a movie.    Take care of your physical body. Eat a balanced diet (low in saturated fat and high in fruits and vegetables) and establish an exercise plan at least 3 times a week for 30 minutes. Even mild-moderate exercise such as brisk walking can make you feel better. Get plenty of sleep.    Don't use alcohol or drugs to cover your emotional pain. This only slows down the emotional healing process.    Don't isolate yourself from others. Have daily contact with family or friends. Talk about your loss to those closest to you.    For additional  support, meet with your , , or rabbi, a counselor or therapist, or your own healthcare provider.    Consider joining a grief support group. Ask your healthcare provider or our staff for information on how to find one in your area.    If you have been prescribed a medicine to help with your symptoms, take it only as directed. Do not use it with alcohol.    Respect your feelings and your self-care needs. Friends or family members may try to be helpful but give unsolicited advice on how you should feel or what you should do. Sometimes this advice can cause more stress than comfort. Say no thank you to suggestions when you need to do so.  Follow-up care  Follow up with your healthcare provider, or as advised.  Call 911  Call  911 if any of these happen:     Trouble breathing    Very confused    Very drowsy or trouble awakening    Fainting or loss of consciousness    Rapid heart rate    Seizure    New chest pain that becomes more severe, lasts longer, or spreads into your shoulder, arm, neck, jaw, or back    Have suicidal thoughts, a suicide plan, and the means to carry out the plan    Have serious thoughts of hurting someone else   When to seek medical advice  Call your healthcare provider right away if any of these happen:    Worsening symptoms    Not eating or sleeping for 3 days in a row    Feeling extreme depression, fear, anxiety, or anger toward yourself or others    Feeling out of control    Feeling that you may try to harm yourself    Having family or friends express concern over your behavior and ask you to get help  [employer] last reviewed this educational content on 7/1/2020 2000-2021 [employer]. All rights reserved. This information is not intended as a substitute for professional medical care. Always follow your healthcare professional's instructions.           Patient Education     Moving Through Grief    Feeling better won t happen overnight. At first, it may be all you can do  just to get through the day. But there is hope. Know that you will feel better with time, as long as you let yourself grieve. You need to grieve in order to heal. It hurts, but it is a normal part of healing process.  The first response  Your first response is often the most intense. You may cry a lot. Or you may feel a deep numbness or shock. Everyone grieves in his or her own way, but there are common signs of grief:    Having intense mood swings    Anxiety and loneliness because you are not with your loved one, and you want to bring the person back.    Sleeping too much or too little    Eating too much or too little    Having trouble thinking clearly    Wanting to be alone all the time  For most people, these symptoms lessen within 6 to 12 months. Talk to your healthcare provider if your symptoms last longer than 12 months.   Give yourself a break  Try not to expect too much of yourself right away. It may be hard to work, take care of family responsibilities, or focus on projects for a while. Give yourself more time than usual to get things done, since you may be distracted. Don't be afraid to ask for help. Take time for yourself. Do some things that you enjoy. Go for a ride in the country. Read. It may feel like nothing brings you danitza. But know that time really does help.  Know your grief process  Let yourself feel all of your feelings and go through your grief fully. The process is full of ups and downs. One day you may feel a lot better. The next day, you may cry again. Try not to think:  I should be over this by now.  There are no  shoulds  to grief. Let yourself mourn your loss as long as you need to. Remember the good times you had with your loved one. It might help to think of ways you dealt with a loss in the past. That way grief won t seem so scary and overwhelming. Talk with your healthcare provider if you are concerned about your grief process.  [employer] last reviewed this educational content on  2/1/2018 2000-2021 [employer]. All rights reserved. This information is not intended as a substitute for professional medical care. Always follow your healthcare professional's instructions.           Patient Education     Understanding Adjustment Disorders  Most people have stress in their lives, and sometimes you may have more than you can handle. You may find it hard to cope with a stressful event. As a result, you may become anxious and depressed. You might even get sick. These can be symptoms of an adjustment disorder. But you don t have to suffer. Ask your healthcare provider or mental health professional for help.     Symptoms of adjustment disorders  Symptoms of adjustment disorders include:    Hopelessness    Frequent crying    Depressed mood    Trembling or twitching    Fast, pounding, or fluttering heartbeat (palpitations)    Health problems    Withdrawal from social contacts and situations    Anxiety or tension    Sleeping too much or too little  What is an adjustment disorder?  Adjustment disorders sometimes occur when life gets to be too much. They often appear within 3 months of a stressful time. The symptoms vary widely. You might pretend the stressful event never happened. Or you might think about it so much you can t eat or sleep. In most cases, your feelings may seem beyond your control.   What causes it?  The events that trigger an adjustment disorder vary from person to person. Adults may be troubled by work, money, or marriage problems. Teens are often more likely bothered by school or conflict with parents. They also may find it hard to cope with a divorce or sexual issues. The death of a loved one can be especially hard to face for all family members. So can major life changes such as a move. Poverty or a lack of social skills may make matters worse.   What can be done?  Adjustment disorders can almost always be helped by therapy. You may feel relieved just to talk to  someone. In some cases, only you and your therapist will meet. In others, your whole family may be involved. You might also join a group for people with this disorder. The support and concern of others can help you recover more quickly.   [employer] last reviewed this educational content on 12/1/2019 2000-2021 [employer]. All rights reserved. This information is not intended as a substitute for professional medical care. Always follow your healthcare professional's instructions.

## 2021-04-14 ASSESSMENT — ANXIETY QUESTIONNAIRES: GAD7 TOTAL SCORE: 14

## 2021-05-28 ENCOUNTER — RECORDS - HEALTHEAST (OUTPATIENT)
Dept: ADMINISTRATIVE | Facility: CLINIC | Age: 76
End: 2021-05-28

## 2021-05-31 ENCOUNTER — RECORDS - HEALTHEAST (OUTPATIENT)
Dept: ADMINISTRATIVE | Facility: CLINIC | Age: 76
End: 2021-05-31

## 2021-06-01 ENCOUNTER — RECORDS - HEALTHEAST (OUTPATIENT)
Dept: ADMINISTRATIVE | Facility: CLINIC | Age: 76
End: 2021-06-01

## 2021-06-21 DIAGNOSIS — E03.9 HYPOTHYROIDISM, UNSPECIFIED TYPE: ICD-10-CM

## 2021-06-21 DIAGNOSIS — F32.1 MODERATE MAJOR DEPRESSION (H): ICD-10-CM

## 2021-06-21 DIAGNOSIS — F43.23 ADJUSTMENT DISORDER WITH MIXED ANXIETY AND DEPRESSED MOOD: ICD-10-CM

## 2021-06-23 RX ORDER — LEVOTHYROXINE SODIUM 125 UG/1
TABLET ORAL
Qty: 90 TABLET | Refills: 3 | Status: SHIPPED | OUTPATIENT
Start: 2021-06-23 | End: 2022-05-12

## 2021-06-23 NOTE — TELEPHONE ENCOUNTER
"Requested Prescriptions   Pending Prescriptions Disp Refills     buPROPion (WELLBUTRIN XL) 150 MG 24 hr tablet [Pharmacy Med Name: BUPROPION HCL XL TABS 150MG] 90 tablet 3     Sig: TAKE 1 TABLET EVERY MORNING       SSRIs Protocol Failed - 6/21/2021  8:36 AM        Failed - PHQ-9 score less than 5 in past 6 months     Please review last PHQ-9 score.           Passed - Medication is Bupropion     If the medication is Bupropion (Wellbutrin), and the patient is taking for smoking cessation; OK to refill.          Passed - Medication is active on med list        Passed - Patient is age 18 or older        Passed - No active pregnancy on record        Passed - No positive pregnancy test in last 12 months        Passed - Recent (6 mo) or future (30 days) visit within the authorizing provider's specialty     Patient had office visit in the last 6 months or has a visit in the next 30 days with authorizing provider or within the authorizing provider's specialty.  See \"Patient Info\" tab in inbasket, or \"Choose Columns\" in Meds & Orders section of the refill encounter.                 "

## 2021-06-24 ENCOUNTER — MYC REFILL (OUTPATIENT)
Dept: FAMILY MEDICINE | Facility: CLINIC | Age: 76
End: 2021-06-24

## 2021-06-24 DIAGNOSIS — F43.23 ADJUSTMENT DISORDER WITH MIXED ANXIETY AND DEPRESSED MOOD: ICD-10-CM

## 2021-06-24 DIAGNOSIS — F32.1 MODERATE MAJOR DEPRESSION (H): ICD-10-CM

## 2021-06-24 RX ORDER — BUPROPION HYDROCHLORIDE 150 MG/1
TABLET ORAL
Qty: 90 TABLET | Refills: 0 | Status: SHIPPED | OUTPATIENT
Start: 2021-06-24 | End: 2021-09-01

## 2021-06-24 RX ORDER — BUPROPION HYDROCHLORIDE 150 MG/1
150 TABLET ORAL EVERY MORNING
Qty: 90 TABLET | Refills: 0 | Status: CANCELLED | OUTPATIENT
Start: 2021-06-24

## 2021-06-24 NOTE — CONFIDENTIAL NOTE
Filled Rx for 1 month. Patient is due for follow up office visit, please notify. Thank you. ALANIS Rivera CNP

## 2021-07-08 ENCOUNTER — OFFICE VISIT (OUTPATIENT)
Dept: FAMILY MEDICINE | Facility: CLINIC | Age: 76
End: 2021-07-08
Payer: COMMERCIAL

## 2021-07-08 VITALS
HEART RATE: 73 BPM | OXYGEN SATURATION: 99 % | SYSTOLIC BLOOD PRESSURE: 142 MMHG | DIASTOLIC BLOOD PRESSURE: 60 MMHG | TEMPERATURE: 98 F | HEIGHT: 64 IN | WEIGHT: 189 LBS | RESPIRATION RATE: 18 BRPM | BODY MASS INDEX: 32.27 KG/M2

## 2021-07-08 DIAGNOSIS — M54.41 ACUTE RIGHT-SIDED LOW BACK PAIN WITH RIGHT-SIDED SCIATICA: Primary | ICD-10-CM

## 2021-07-08 DIAGNOSIS — N62 HYPERTROPHY OF BREAST: ICD-10-CM

## 2021-07-08 PROCEDURE — 99214 OFFICE O/P EST MOD 30 MIN: CPT | Performed by: FAMILY MEDICINE

## 2021-07-08 ASSESSMENT — MIFFLIN-ST. JEOR: SCORE: 1337.3

## 2021-07-08 NOTE — PROGRESS NOTES
"    Assessment & Plan     Acute right-sided low back pain with right-sided sciatica  Patient feels she has failed PT as she has had a therapist work on it about three times at her home. She's wondering if an injection would be helpful  Will have her see orthopedics (non-surgical)  - Spine Referral; Future    Hypertrophy of breast     - PLASTIC SURGERY REFERRAL             BMI:   Estimated body mass index is 32.44 kg/m  as calculated from the following:    Height as of this encounter: 1.626 m (5' 4\").    Weight as of this encounter: 85.7 kg (189 lb).           No follow-ups on file.    Tomasa Rios MD  [facility]    Howie Hunt is a 75 year old who presents for the following health issues  accompanied by herself:    HPI     Back Pain  Onset/Duration: 2 months  Description:   Location of pain: low back right, feels she has sciatica pain.  Character of pain: bad ache  Pain radiation: radiates into the right buttocks, radiates into the right leg and radiates into the right foot at times.  New numbness or weakness in legs, not attributed to pain: no   Intensity: moderate, severe  Progression of Symptoms: worsening  History:   Specific cause: none  Pain interferes with job: YES- With her daily life.  History of back problems: no prior back problems  Any previous MRI or X-rays: None  Sees a specialist for back pain: No  Alleviating factors:   Improved by: None    Precipitating factors:  Worsened by: Sitting in the car or recliner.  Therapies tried and outcome: Celebrex and acetaminophen (Tylenol) for her arthritis.  Chiropractor at times.  Has had a Physical Therapist look at it and treat her x 3 at her home (friend is retired PT).    Patient would also like referral to plastic surgery to discuss breast reduction mammoplasty.    Despite losing 70 lbs, she has large, pendulous breasts and this causes back/shoulder discomfort.  She wears a brace at home to help with this pain. " "      Accompanying Signs & Symptoms:  Risk of Fracture: Age >64  Risk of Cauda Equina: None  Risk of Infection: None  Risk of Cancer: None  Risk of Ankylosing Spondylitis: Onset at age <35, male, AND morning back stiffness  no         REFERRAL:  She would like to discuss about getting a referral for a breast reduction.  She has lost 70 pounds and feels it is time for a reduction.      Review of Systems   Constitutional, HEENT, cardiovascular, pulmonary, gi and gu systems are negative, except as otherwise noted.      Objective    BP (!) 142/60   Pulse 73   Temp 98  F (36.7  C) (Tympanic)   Resp 18   Ht 1.626 m (5' 4\")   Wt 85.7 kg (189 lb)   SpO2 99%   BMI 32.44 kg/m    Body mass index is 32.44 kg/m .  Physical Exam   GENERAL APPEARANCE: healthy, alert and no distress  Comprehensive back pain exam:  Tenderness of right piriformis, Range of motion not limited by pain, Lower extremity strength functional and equal on both sides,  , Lower extremity sensation normal and equal on both sides and Straight leg positive on  right, indicating possible ipsilateral radiculopathy                "

## 2021-07-13 ENCOUNTER — RECORDS - HEALTHEAST (OUTPATIENT)
Dept: ADMINISTRATIVE | Facility: CLINIC | Age: 76
End: 2021-07-13

## 2021-07-16 ENCOUNTER — MYC MEDICAL ADVICE (OUTPATIENT)
Dept: FAMILY MEDICINE | Facility: CLINIC | Age: 76
End: 2021-07-16

## 2021-07-16 DIAGNOSIS — N62 HYPERTROPHY OF BREAST: Primary | ICD-10-CM

## 2021-07-22 ENCOUNTER — OFFICE VISIT (OUTPATIENT)
Dept: ORTHOPEDICS | Facility: CLINIC | Age: 76
End: 2021-07-22
Payer: COMMERCIAL

## 2021-07-22 ENCOUNTER — ANCILLARY PROCEDURE (OUTPATIENT)
Dept: GENERAL RADIOLOGY | Facility: CLINIC | Age: 76
End: 2021-07-22
Attending: FAMILY MEDICINE
Payer: COMMERCIAL

## 2021-07-22 VITALS
DIASTOLIC BLOOD PRESSURE: 72 MMHG | BODY MASS INDEX: 32.27 KG/M2 | HEIGHT: 64 IN | WEIGHT: 189 LBS | SYSTOLIC BLOOD PRESSURE: 136 MMHG

## 2021-07-22 DIAGNOSIS — M54.41 ACUTE RIGHT-SIDED LOW BACK PAIN WITH RIGHT-SIDED SCIATICA: Primary | ICD-10-CM

## 2021-07-22 DIAGNOSIS — M54.41 ACUTE RIGHT-SIDED LOW BACK PAIN WITH RIGHT-SIDED SCIATICA: ICD-10-CM

## 2021-07-22 PROCEDURE — 72100 X-RAY EXAM L-S SPINE 2/3 VWS: CPT | Performed by: RADIOLOGY

## 2021-07-22 PROCEDURE — 99204 OFFICE O/P NEW MOD 45 MIN: CPT | Performed by: FAMILY MEDICINE

## 2021-07-22 RX ORDER — METHYLPREDNISOLONE 4 MG
TABLET, DOSE PACK ORAL
Qty: 21 TABLET | Refills: 0 | Status: SHIPPED | OUTPATIENT
Start: 2021-07-22 | End: 2021-10-06

## 2021-07-22 ASSESSMENT — MIFFLIN-ST. JEOR: SCORE: 1337.3

## 2021-07-22 NOTE — LETTER
7/22/2021         RE: Marilee Marks  [address]  [place] [address]        Dear Colleague,    Thank you for referring your patient, Marilee Marks, to the [facility]. Please see a copy of my visit note below.    ASSESSMENT & PLAN    Marilee was seen today for pain.    Diagnoses and all orders for this visit:    Acute right-sided low back pain with right-sided sciatica  -     Spine Referral      This issue is acute and Worsening.    # Acute Right Lumbar Radiculopathy: Pain notable over the past couple of months in the setting of working in her garden.  There is pain over the right lower back worse with palpation over the sciatic notch.  Reviewed x-rays today showing significant disc degeneration at L4-5 and L5-S1 levels.  Etiology of her pain likely acute lumbar radiculopathy (sciatica).  No weakness on examination today.  Given she has failed conservative treatments including chiropractic manipulations as well as physical therapy plan to treat as below and follow-up if not improving.  Image Findings: lumbar degeneration at L4/5, L5/S1  Treatment: Activities as tolerated, home exercises given today  Medications/Injections: Limited tylenol/ibuprofen for pain for 1-2 weeks, Medrol dosepack  Follow-up: In one month if symptoms do not improve, sooner if worsening  Can consider lumbar epidural steroid injection, will need lumbar MRI prior to this.    Manuel Reich MD  [facility]    -----  Chief Complaint   Patient presents with     Lower Back - Pain       SUBJECTIVE  Marilee Marks is a/an 75 year old female who is seen in consultation at the request of  Tomasa Rios M.D. for evaluation of low back pain.      The patient is seen by themselves.    Onset: 2 month(s) ago. Patient describes injury as bending over to pull weeds.  Tried PT and chiropractor no improvement.  Tried HEP.  [place] next  "week  Location of Pain: right sided low back radiating to buttock and posterior thigh   Worsened by: sitting   Better with: sleeping   Treatments tried:  Chiropractor, Tylenol, Physical therapy with friend   Associated symptoms: no distal numbness or tingling; denies swelling or warmth    Orthopedic/Surgical history: YES - [facility]-Dr. Deras 2014, right ELDER 11/2019  Social History/Occupation: Retired 3M    No family history pertinent to patient's problem today.       REVIEW OF SYSTEMS:  Review of Systems  Constitutional, HEENT, cardiovascular, pulmonary, GI, , musculoskeletal, neuro, skin, endocrine and psych systems are negative, except as otherwise noted.    OBJECTIVE:  Ht 1.626 m (5' 4\")   Wt 85.7 kg (189 lb)   BMI 32.44 kg/m     General: healthy, alert and in no distress  HEENT: no scleral icterus or conjunctival erythema  Skin: no suspicious lesions or rash. No jaundice.  CV: distal perfusion intact    Resp: normal respiratory effort without conversational dyspnea   Psych: normal mood and affect  Gait: normal steady gait with appropriate coordination and balance    Neuro: Normal light sensory exam of right lower extremity     THORACIC/LUMBAR SPINE  Inspection:    No redness, swelling, overlying skin change, gross deformity/asymmetry, scapular winging  Palpation:    Tender about the right sciatic notch. Otherwise remainder of landmarks are nontender.  Range of Motion:     Lumbar flexion full    Lumbar extension full    Right side bend full    Left side bend full    Right rotation full    Left rotation full  Strength:    5/5 - quadriceps, hamstrings, tibialis anterior, gastrocsoleus, and extensor hallicus longus  Special Tests:    Positive: straight leg raise (right)  Negative: straight leg raise (left), slump test (bilateral), PAMELA BARAJAS      RADIOLOGY:  I independently  ordered, visualized and reviewed these images with the patient  Significant lumbar disc degeneration at L4/5 and L5/S1     56}   "         Again, thank you for allowing me to participate in the care of your patient.        Sincerely,        Manuel Reich MD

## 2021-07-22 NOTE — PROGRESS NOTES
ASSESSMENT & PLAN    Marilee was seen today for pain.    Diagnoses and all orders for this visit:    Acute right-sided low back pain with right-sided sciatica  -     Spine Referral      This issue is acute and Worsening.    # Acute Right Lumbar Radiculopathy: Pain notable over the past couple of months in the setting of working in her garden.  There is pain over the right lower back worse with palpation over the sciatic notch.  Reviewed x-rays today showing significant disc degeneration at L4-5 and L5-S1 levels.  Etiology of her pain likely acute lumbar radiculopathy (sciatica).  No weakness on examination today.  Given she has failed conservative treatments including chiropractic manipulations as well as physical therapy plan to treat as below and follow-up if not improving.  Image Findings: lumbar degeneration at L4/5, L5/S1  Treatment: Activities as tolerated, home exercises given today  Medications/Injections: Limited tylenol/ibuprofen for pain for 1-2 weeks, Medrol dosepack  Follow-up: In one month if symptoms do not improve, sooner if worsening  Can consider lumbar epidural steroid injection, will need lumbar MRI prior to this.    Manuel Reich MD  [facility]    -----  Chief Complaint   Patient presents with     Lower Back - Pain       SUBJECTIVE  Marilee Marks is a/an 75 year old female who is seen in consultation at the request of  Tomasa Rios M.D. for evaluation of low back pain.      The patient is seen by themselves.    Onset: 2 month(s) ago. Patient describes injury as bending over to pull weeds.  Tried PT and chiropractor no improvement.  Tried HEP.  [place] next week  Location of Pain: right sided low back radiating to buttock and posterior thigh   Worsened by: sitting   Better with: sleeping   Treatments tried:  Chiropractor, Tylenol, Physical therapy with friend   Associated symptoms: no distal numbness or tingling; denies swelling or  "warmth    Orthopedic/Surgical history: YES - [facility]-Dr. Deras 2014, right ELDER 11/2019  Social History/Occupation: Retired 3M    No family history pertinent to patient's problem today.       REVIEW OF SYSTEMS:  Review of Systems  Constitutional, HEENT, cardiovascular, pulmonary, GI, , musculoskeletal, neuro, skin, endocrine and psych systems are negative, except as otherwise noted.    OBJECTIVE:  Ht 1.626 m (5' 4\")   Wt 85.7 kg (189 lb)   BMI 32.44 kg/m     General: healthy, alert and in no distress  HEENT: no scleral icterus or conjunctival erythema  Skin: no suspicious lesions or rash. No jaundice.  CV: distal perfusion intact    Resp: normal respiratory effort without conversational dyspnea   Psych: normal mood and affect  Gait: normal steady gait with appropriate coordination and balance    Neuro: Normal light sensory exam of right lower extremity     THORACIC/LUMBAR SPINE  Inspection:    No redness, swelling, overlying skin change, gross deformity/asymmetry, scapular winging  Palpation:    Tender about the right sciatic notch. Otherwise remainder of landmarks are nontender.  Range of Motion:     Lumbar flexion full    Lumbar extension full    Right side bend full    Left side bend full    Right rotation full    Left rotation full  Strength:    5/5 - quadriceps, hamstrings, tibialis anterior, gastrocsoleus, and extensor hallicus longus  Special Tests:    Positive: straight leg raise (right)  Negative: straight leg raise (left), slump test (bilateral), PAMELA BARAJAS      RADIOLOGY:  I independently  ordered, visualized and reviewed these images with the patient  Significant lumbar disc degeneration at L4/5 and L5/S1     56}       "

## 2021-07-22 NOTE — PATIENT INSTRUCTIONS
# Acute Right Lumbar Radiculopathy: Pain notable over the past couple of months in the setting of working in her garden.  There is pain over the right lower back worse with palpation over the sciatic notch.  Reviewed x-rays today showing significant disc degeneration at L4-5 and L5-S1 levels.  Etiology of her pain likely acute lumbar radiculopathy (sciatica).  No weakness on examination today.  Given she has failed conservative treatments including chiropractic manipulations as well as physical therapy plan to treat as below and follow-up if not improving.  Image Findings: lumbar degeneration at L4/5, L5/S1  Treatment: Activities as tolerated, home exercises given today  Medications/Injections: Limited tylenol/ibuprofen for pain for 1-2 weeks, Medrol dosepack  Follow-up: In one month if symptoms do not improve, sooner if worsening  Can consider lumbar epidural steroid injection, will need lumbar MRI prior to this.    Please call [phone]    Ask for my team if you have any questions or concerns    It was great seeing you today!    Manuel Reich MD, [facility]       Patient Education     Understanding Lumbar Radiculopathy    Lumbar radiculopathy is irritation or inflammation of a nerve root in the low back. It causes symptoms that spread out from the back down one or both legs. To understand this condition, it helps to understand the parts of the spine:    Vertebrae. These are bones that stack to form the spine. The lumbar spine contains 5 vertebrae near the bottom of your spine.    Disks. These are soft pads of tissue between the vertebrae. They act as shock absorbers for the spine.    Spinal canal. This is a tunnel formed within the stacked vertebrae. In the lumbar spine, nerves run through this canal.    Nerves. These branch off and leave the spinal canal, traveling out to parts of the body. As they leave the spinal canal, nerves pass through openings between the vertebrae. The nerve root is the part of the  nerve that is closest to the spinal canal.    Sciatic nerve. This is a large nerve formed from several nerve roots in the low back. This nerve extends down the back of the leg to the foot.  With lumbar radiculopathy, nerve roots in the low back become irritated. This leads to pain and symptoms. The sciatic nerve is commonly involved, so the condition is often called sciatica.  What causes lumbar radiculopathy?  Aging, injury, poor posture, extra body weight, and other issues can lead to problems in the low back. These problems may then irritate nerve roots. They include:    Damage to a disk in the lumbar spine. The damaged disk may then press on nearby nerve roots.    Degeneration from wear and tear, and aging. This can lead to narrowing (stenosis) of the openings between the vertebrae. The narrowed openings press on nerve roots as they leave the spinal canal.    Unstable spine. This is when a vertebra slips forward. It can then press on a nerve root.  Other, less common things can put pressure on nerves in the low back. These include diabetes, infection, or a tumor.  Symptoms of lumbar radiculopathy  These include:    Pain in the low back    Pain, numbness, tingling, or weakness that travels into the buttocks, hip, groin, or leg    Muscle spasms in the low back, or leg  Treatment for lumbar radiculopathy  In most cases, your healthcare provider will first try treatments that help relieve symptoms. These may include:    Prescription and over-the-counter pain medicines. These help relieve pain, swelling, and irritation.    Limits on positions and activities that increase pain. But lying in bed or avoiding all movement is only recommended for a short period of time.    Physical therapy, including exercises and stretches. This helps decrease pain and increase movement and function.    Steroid shots into the lower back. This may help relieve symptoms for a time.    Weight-loss program. If you are overweight, losing  extra pounds (kilograms) may help relieve symptoms.  In some cases, you may need surgery to fix the underlying problem. This depends on the cause, the symptoms, and how long the pain has lasted.  Possible complications  Over time, an irritated and inflamed nerve may become damaged. This may lead to long-lasting (permanent) numbness or weakness in your legs and feet. If symptoms change suddenly or get worse, be sure to let your healthcare provider know.  When to call your healthcare provider  Call your healthcare provider right away if you have any of these:    New pain or pain that gets worse    New or increasing weakness, tingling, or numbness in your leg or foot    Problems controlling your bladder or bowel  [employer] last reviewed this educational content on 2/1/2020 2000-2021 [employer]. All rights reserved. This information is not intended as a substitute for professional medical care. Always follow your healthcare professional's instructions.

## 2021-08-30 DIAGNOSIS — M19.91 PRIMARY OSTEOARTHRITIS, UNSPECIFIED SITE: ICD-10-CM

## 2021-08-30 DIAGNOSIS — F43.23 ADJUSTMENT DISORDER WITH MIXED ANXIETY AND DEPRESSED MOOD: ICD-10-CM

## 2021-08-30 DIAGNOSIS — F32.1 MODERATE MAJOR DEPRESSION (H): ICD-10-CM

## 2021-08-30 RX ORDER — CELECOXIB 200 MG/1
CAPSULE ORAL
Qty: 90 CAPSULE | Refills: 3 | Status: SHIPPED | OUTPATIENT
Start: 2021-08-30 | End: 2022-10-14

## 2021-08-30 RX ORDER — TOPIRAMATE 50 MG/1
TABLET, FILM COATED ORAL
Qty: 180 TABLET | Refills: 3 | Status: SHIPPED | OUTPATIENT
Start: 2021-08-30 | End: 2022-05-12

## 2021-08-31 NOTE — TELEPHONE ENCOUNTER
"Requested Prescriptions   Pending Prescriptions Disp Refills     buPROPion (WELLBUTRIN XL) 150 MG 24 hr tablet [Pharmacy Med Name: BUPROPION HCL XL TABS 150MG] 90 tablet 3     Sig: TAKE 1 TABLET EVERY MORNING       SSRIs Protocol Failed - 8/30/2021  8:54 AM        Failed - PHQ-9 score less than 5 in past 6 months     Please review last PHQ-9 score.           Passed - Medication is Bupropion     If the medication is Bupropion (Wellbutrin), and the patient is taking for smoking cessation; OK to refill.          Passed - Medication is active on med list        Passed - Patient is age 18 or older        Passed - No active pregnancy on record        Passed - No positive pregnancy test in last 12 months        Passed - Recent (6 mo) or future (30 days) visit within the authorizing provider's specialty     Patient had office visit in the last 6 months or has a visit in the next 30 days with authorizing provider or within the authorizing provider's specialty.  See \"Patient Info\" tab in inbasket, or \"Choose Columns\" in Meds & Orders section of the refill encounter.                 "

## 2021-09-01 RX ORDER — BUPROPION HYDROCHLORIDE 150 MG/1
TABLET ORAL
Qty: 90 TABLET | Refills: 3 | Status: SHIPPED | OUTPATIENT
Start: 2021-09-01 | End: 2022-05-12

## 2021-09-07 ENCOUNTER — MYC MEDICAL ADVICE (OUTPATIENT)
Dept: FAMILY MEDICINE | Facility: CLINIC | Age: 76
End: 2021-09-07

## 2021-09-08 ENCOUNTER — MYC MEDICAL ADVICE (OUTPATIENT)
Dept: FAMILY MEDICINE | Facility: CLINIC | Age: 76
End: 2021-09-08

## 2021-09-08 NOTE — TELEPHONE ENCOUNTER
Please see Keepio message.    BP Readings from Last 3 Encounters:   07/22/21 136/72   07/08/21 (!) 142/60   04/13/21 (!) 142/64     Ileana Tan RN

## 2021-09-12 ENCOUNTER — HEALTH MAINTENANCE LETTER (OUTPATIENT)
Age: 76
End: 2021-09-12

## 2021-09-22 ENCOUNTER — TELEPHONE (OUTPATIENT)
Dept: SURGERY | Facility: CLINIC | Age: 76
End: 2021-09-22

## 2021-09-22 NOTE — TELEPHONE ENCOUNTER
Patient called to complete previsit in regards to appointment with  on 9/24/21 No answer, left general message for patient to call the [facility] back at [phone]....Janet De La Rosa RN

## 2021-09-24 ENCOUNTER — OFFICE VISIT (OUTPATIENT)
Dept: SURGERY | Facility: CLINIC | Age: 76
End: 2021-09-24
Payer: COMMERCIAL

## 2021-09-24 VITALS — BODY MASS INDEX: 32.98 KG/M2 | WEIGHT: 193.19 LBS | HEIGHT: 64 IN

## 2021-09-24 DIAGNOSIS — N62 BREAST HYPERTROPHY: Primary | ICD-10-CM

## 2021-09-24 PROCEDURE — 99203 OFFICE O/P NEW LOW 30 MIN: CPT | Performed by: PLASTIC SURGERY

## 2021-09-24 RX ORDER — HYDROCHLOROTHIAZIDE 12.5 MG/1
12.5 TABLET ORAL DAILY
COMMUNITY
End: 2021-10-06

## 2021-09-24 ASSESSMENT — MIFFLIN-ST. JEOR: SCORE: 1356.29

## 2021-09-24 NOTE — PROGRESS NOTES
REFERRING PROVIDER:  Tomasa Rios MD    PRESENTING COMPLAINT:  Consultation for breast reduction.    HISTORY OF PRESENTING COMPLAINT:  Ms. Gallegos 75 years old.  She has been large breasted all of her adult and adolescent life.  She would like a breast reduction.  She has a lot of upper back, neck, shoulder pain, shoulder grooving from bra straps and inframammary fold rashes during summers.  She was DDD bra.  She would like to be around a C cup.  Last mammogram was in 10/2020, was negative.  She has used over-the-counter and prescribed medications without continued relief and appropriate supportive garments.    PAST MEDICAL HISTORY:  Hypertension, hypothyroidism, vitiligo, depression.    PAST SURGICAL HISTORY:  Knee surgery, hernia repair, gastroplasty, lumpectomy, left-sided and tubal ligation.    MEDICATIONS:    1.  Bupropion.  2.  Celecoxib   3.  Hydrochlorothiazide.  4.  Levothyroxine.  5.  Triamterene/hydrochlorothiazide,.    ALLERGIES:  Codeine.    SOCIAL HISTORY:  Does not smoke, does not drink.  Lives in [place], is retired.    REVIEW OF SYSTEMS:  Denies chest pain, shortness of breath, MI, CVA, DVT and PE.    PHYSICAL EXAMINATION:  Vital signs stable.  She is afebrile, in no obvious distress.  She is 5 feet 5-1/2 inches, 193 pounds, BMI of 32 kg/m2, body surface area of 1.97 kg/m2.  On examination of her breasts, she has grade 3 ptosis, right side is significantly larger than the left by about 20%-25%.  Sternal notch to nipple distance under 40 cm.    ASSESSMENT AND PLAN:  Based on the above findings, a diagnosis of symptomatic bilateral breast hypertrophy was made.  I had a sariah discussion with the patient and her  about breast reduction, explained what it would entail, showed them where the scars would be.  I was very clear about expectations including asymmetries of the breasts, numbness of the nipples, inability to promise a cup size, permanent and prominent scarring.  They were okay with  that.  Risks of pain, infection, bleeding, scarring, asymmetry, seromas, hematomas, wound breakdown, wound dehiscence, skin necrosis, fat necrosis, nipple necrosis, T-junction site necrosis, requirement of a free nipple graft, DVT, PE, MI, CVA, pneumonia, renal failure and death were explained.  She understood them all and wants to proceed.      I look forward to helping her out in the near future.  Based on her Schnur scale, 620 grams needs to be removed from each breast.  I think that is possible and leave her about a B cup or 80% smaller.  She was okay with that.  She needs a fresh mammogram.      We will get prior authorization and proceed as indicated.  All questions answered.  All exam and discussion done in the presence of a chaperone.    Total time spent with chart review, visit itself post-visit paperwork was 30 minutes.    cc:   Tomasa Rios MD   [facility]  [address] [place] [address]

## 2021-09-24 NOTE — LETTER
9/24/2021         RE: Marilee Marks  [address] [address] [address]        Dear Colleague,    Thank you for referring your patient, Marilee Marks, to the [facility]. Please see a copy of my visit note below.    REFERRING PROVIDER:  Tomasa Rios MD    PRESENTING COMPLAINT:  Consultation for breast reduction.    HISTORY OF PRESENTING COMPLAINT:  Ms. Gallegos 75 years old.  She has been large breasted all of her adult and adolescent life.  She would like a breast reduction.  She has a lot of upper back, neck, shoulder pain, shoulder grooving from bra straps and inframammary fold rashes during summers.  She was DDD bra.  She would like to be around a C cup.  Last mammogram was in 10/2020, was negative.  She has used over-the-counter and prescribed medications without continued relief and appropriate supportive garments.    PAST MEDICAL HISTORY:  Hypertension, hypothyroidism, vitiligo, depression.    PAST SURGICAL HISTORY:  Knee surgery, hernia repair, gastroplasty, lumpectomy, left-sided and tubal ligation.    MEDICATIONS:    1.  Bupropion.  2.  Celecoxib   3.  Hydrochlorothiazide.  4.  Levothyroxine.  5.  Triamterene/hydrochlorothiazide,.    ALLERGIES:  Codeine.    SOCIAL HISTORY:  Does not smoke, does not drink.  Lives in [place], is retired.    REVIEW OF SYSTEMS:  Denies chest pain, shortness of breath, MI, CVA, DVT and PE.    PHYSICAL EXAMINATION:  Vital signs stable.  She is afebrile, in no obvious distress.  She is 5 feet 5-1/2 inches, 193 pounds, BMI of 32 kg/m2, body surface area of 1.97 kg/m2.  On examination of her breasts, she has grade 3 ptosis, right side is significantly larger than the left by about 20%-25%.  Sternal notch to nipple distance under 40 cm.    ASSESSMENT AND PLAN:  Based on the above findings, a diagnosis of symptomatic bilateral breast hypertrophy was made.  I had a sariah discussion with the patient and her  about breast  reduction, explained what it would entail, showed them where the scars would be.  I was very clear about expectations including asymmetries of the breasts, numbness of the nipples, inability to promise a cup size, permanent and prominent scarring.  They were okay with that.  Risks of pain, infection, bleeding, scarring, asymmetry, seromas, hematomas, wound breakdown, wound dehiscence, skin necrosis, fat necrosis, nipple necrosis, T-junction site necrosis, requirement of a free nipple graft, DVT, PE, MI, CVA, pneumonia, renal failure and death were explained.  She understood them all and wants to proceed.      I look forward to helping her out in the near future.  Based on her Schnur scale, 620 grams needs to be removed from each breast.  I think that is possible and leave her about a B cup or 80% smaller.  She was okay with that.  She needs a fresh mammogram.      We will get prior authorization and proceed as indicated.  All questions answered.  All exam and discussion done in the presence of a chaperone.    Total time spent with chart review, visit itself post-visit paperwork was 30 minutes.    cc:   Tomasa Rios MD   [facility]  [address] [place] [address]           Again, thank you for allowing me to participate in the care of your patient.        Sincerely,        FELIX Madrigal MD

## 2021-09-24 NOTE — NURSING NOTE
Per Dr. Madrigal bilateral breast pictures to be taken. Pictures taken facing forward, 45 and 90 degree angle on both sides.        Cintia Case on 9/24/2021 at 4:20 PM

## 2021-09-25 ENCOUNTER — MYC MEDICAL ADVICE (OUTPATIENT)
Dept: FAMILY MEDICINE | Facility: CLINIC | Age: 76
End: 2021-09-25

## 2021-09-27 ENCOUNTER — TELEPHONE (OUTPATIENT)
Dept: PEDIATRICS | Facility: CLINIC | Age: 76
End: 2021-09-27

## 2021-09-27 DIAGNOSIS — Z12.31 VISIT FOR SCREENING MAMMOGRAM: Primary | ICD-10-CM

## 2021-09-27 DIAGNOSIS — N62 HYPERTROPHY OF BREAST: ICD-10-CM

## 2021-09-27 NOTE — TELEPHONE ENCOUNTER
PA submitted and pending for BRM    PB DOS: tbd  Type of Procedure: brm  CPT Codes: 41082  ICD10 Codes: n62  Surgeon/Ordering provider: vanessa  Pre-cert/Authorization completed:  Pa pending  Payer: [facility]  Date Checked: 9/27/21  Spoke to [facility] fax  Ref. #/ Auth #   Valid Dates:

## 2021-09-28 NOTE — TELEPHONE ENCOUNTER
Future Mammogram orders placed. Pt verbalized understanding and states she has an appointment already for the Mammogram on 10/18. No further questions or concerns at this time...Janet De La Rosa RN

## 2021-09-29 ENCOUNTER — HOSPITAL ENCOUNTER (OUTPATIENT)
Facility: AMBULATORY SURGERY CENTER | Age: 76
End: 2021-09-29
Attending: PLASTIC SURGERY | Admitting: PLASTIC SURGERY
Payer: COMMERCIAL

## 2021-09-29 ENCOUNTER — MYC MEDICAL ADVICE (OUTPATIENT)
Dept: FAMILY MEDICINE | Facility: CLINIC | Age: 76
End: 2021-09-29

## 2021-09-29 DIAGNOSIS — N62 HYPERTROPHY OF BREAST: ICD-10-CM

## 2021-09-29 DIAGNOSIS — N62 HYPERTROPHY OF BREAST: Primary | ICD-10-CM

## 2021-09-29 RX ORDER — CEFAZOLIN SODIUM 2 G/50ML
2 SOLUTION INTRAVENOUS
Status: CANCELLED | OUTPATIENT
Start: 2021-09-29

## 2021-09-29 RX ORDER — CEFAZOLIN SODIUM 2 G/50ML
2 SOLUTION INTRAVENOUS SEE ADMIN INSTRUCTIONS
Status: CANCELLED | OUTPATIENT
Start: 2021-09-29

## 2021-09-29 NOTE — TELEPHONE ENCOUNTER
Date Scheduled: 10-22-21  Facility: [facility]  Surgeon: Dr. Madrigal   Post-op appointment scheduled:   Next 5 appointments (look out 90 days)    Oct 06, 2021  2:40 PM  Segundo Lancaster with Tomasa Rios MD  [facility] ([facility] ) [address] [address] [address]  [phone]   Oct 18, 2021 11:30 AM  Pre-procedure Covid with CL COVID LAB  [facility] ([facility] ) [address] [facility] [address]  [phone]        10-29-21 (1 week post op instead of 2 weeks due to patient being out of town)   scheduled?: No  Surgery packet/instructions confirmed received?  No  Special Considerations:   Marilu Davila, Surgery Scheduling Coordinator

## 2021-09-30 DIAGNOSIS — Z11.59 ENCOUNTER FOR SCREENING FOR OTHER VIRAL DISEASES: ICD-10-CM

## 2021-10-06 ENCOUNTER — OFFICE VISIT (OUTPATIENT)
Dept: FAMILY MEDICINE | Facility: CLINIC | Age: 76
End: 2021-10-06
Payer: COMMERCIAL

## 2021-10-06 VITALS
HEART RATE: 89 BPM | SYSTOLIC BLOOD PRESSURE: 102 MMHG | BODY MASS INDEX: 32.95 KG/M2 | HEIGHT: 64 IN | DIASTOLIC BLOOD PRESSURE: 50 MMHG | WEIGHT: 193 LBS | RESPIRATION RATE: 16 BRPM | OXYGEN SATURATION: 96 % | TEMPERATURE: 98.5 F

## 2021-10-06 DIAGNOSIS — Z01.818 PREOP GENERAL PHYSICAL EXAM: Primary | ICD-10-CM

## 2021-10-06 DIAGNOSIS — Z23 NEED FOR PROPHYLACTIC VACCINATION AND INOCULATION AGAINST INFLUENZA: ICD-10-CM

## 2021-10-06 DIAGNOSIS — Z86.718 PERSONAL HISTORY OF DVT (DEEP VEIN THROMBOSIS): ICD-10-CM

## 2021-10-06 DIAGNOSIS — R94.31 ABNORMAL ELECTROCARDIOGRAM (ECG) (EKG): ICD-10-CM

## 2021-10-06 DIAGNOSIS — N62 HYPERTROPHY OF BREAST: ICD-10-CM

## 2021-10-06 DIAGNOSIS — I10 BENIGN ESSENTIAL HYPERTENSION: ICD-10-CM

## 2021-10-06 DIAGNOSIS — Z23 HIGH PRIORITY FOR 2019-NCOV VACCINE: ICD-10-CM

## 2021-10-06 DIAGNOSIS — I44.7 LBBB (LEFT BUNDLE BRANCH BLOCK): ICD-10-CM

## 2021-10-06 LAB
ANION GAP SERPL CALCULATED.3IONS-SCNC: 5 MMOL/L (ref 3–14)
BUN SERPL-MCNC: 21 MG/DL (ref 7–30)
CALCIUM SERPL-MCNC: 9.6 MG/DL (ref 8.5–10.1)
CHLORIDE BLD-SCNC: 108 MMOL/L (ref 94–109)
CO2 SERPL-SCNC: 25 MMOL/L (ref 20–32)
CREAT SERPL-MCNC: 0.96 MG/DL (ref 0.52–1.04)
GFR SERPL CREATININE-BSD FRML MDRD: 58 ML/MIN/1.73M2
GLUCOSE BLD-MCNC: 104 MG/DL (ref 70–99)
POTASSIUM BLD-SCNC: 4.2 MMOL/L (ref 3.4–5.3)
SODIUM SERPL-SCNC: 138 MMOL/L (ref 133–144)

## 2021-10-06 PROCEDURE — 90662 IIV NO PRSV INCREASED AG IM: CPT | Performed by: FAMILY MEDICINE

## 2021-10-06 PROCEDURE — 93000 ELECTROCARDIOGRAM COMPLETE: CPT | Performed by: FAMILY MEDICINE

## 2021-10-06 PROCEDURE — 36415 COLL VENOUS BLD VENIPUNCTURE: CPT | Performed by: FAMILY MEDICINE

## 2021-10-06 PROCEDURE — 80048 BASIC METABOLIC PNL TOTAL CA: CPT | Performed by: FAMILY MEDICINE

## 2021-10-06 PROCEDURE — 0004A COVID-19,PF,PFIZER (12+ YRS): CPT | Performed by: FAMILY MEDICINE

## 2021-10-06 PROCEDURE — 99214 OFFICE O/P EST MOD 30 MIN: CPT | Mod: 25 | Performed by: FAMILY MEDICINE

## 2021-10-06 PROCEDURE — 91300 COVID-19,PF,PFIZER (12+ YRS): CPT | Performed by: FAMILY MEDICINE

## 2021-10-06 PROCEDURE — G0008 ADMIN INFLUENZA VIRUS VAC: HCPCS | Performed by: FAMILY MEDICINE

## 2021-10-06 RX ORDER — HYDROCHLOROTHIAZIDE 12.5 MG/1
12.5 TABLET ORAL DAILY
Qty: 90 TABLET | Refills: 3 | Status: SHIPPED | OUTPATIENT
Start: 2021-10-06 | End: 2022-09-14

## 2021-10-06 ASSESSMENT — MIFFLIN-ST. JEOR: SCORE: 1355.44

## 2021-10-06 ASSESSMENT — PAIN SCALES - GENERAL: PAINLEVEL: NO PAIN (0)

## 2021-10-06 NOTE — PATIENT INSTRUCTIONS
N[facility] Stress in [place] [phone]    Our Clinic hours are:  Mondays    7:20 am - 7 pm  Tues -  Fri  7:20 am - 5 pm    Clinic Phone: [phone]    The clinic lab opens at 7:30 am Mon - Fri and appointments are required.    [facility]  Ph. [phone]  Monday  8 am - 7pm  Tues - Fri 8 am - 5:30 pm

## 2021-10-06 NOTE — PROGRESS NOTES
[facility]  [address]  [facility] [address]  Phone: [phone]  Primary Provider: Tomsaa Espinal  Pre-op Performing Provider: TOMASA ESPINAL      Chief Complaint   Patient presents with     Pre-Op Exam     Imm/Inj     COVID-19 VACCINE     Imm/Inj     Flu Shot     Hypertension     Patient is concerned that her blood pressure medication was changed without discussing. Would like to talk to PCP about medication.       PREOPERATIVE EVALUATION:  Today's date: 10/6/2021    Marilee Marks is a 75 year old female who presents for a preoperative evaluation.    Surgical Information:  Surgery/Procedure: MAMMOPLASTY, REDUCTION, BILATERAL  Surgery Location: [facility]   Surgeon: FELIX Madrigal MD  Surgery Date: 10/22/21  Time of Surgery: 7:00 AM  Where patient plans to recover: At home with family  Fax number for surgical facility: Note does not need to be faxed, will be available electronically in Epic.    Type of Anesthesia Anticipated: Combined General with Block    Assessment & Plan     The proposed surgical procedure is considered LOW risk.    Preop general physical exam     - EKG 12-lead complete w/read - Clinics    Hypertrophy of breast       Need for prophylactic vaccination and inoculation against influenza     - INFLUENZA, QUAD, HIGH DOSE, PF, 65YR + (FLUZONE HD)    High priority for 2019-nCoV vaccine     - COVID-19,PF,PFIZER    Benign essential hypertension     - hydrochlorothiazide (HYDRODIURIL) 12.5 MG tablet; Take 1 tablet (12.5 mg) by mouth daily  - Basic metabolic panel  (Ca, Cl, CO2, Creat, Gluc, K, Na, BUN); Future  - EKG 12-lead complete w/read - Clinics    Personal history of DVT (deep vein thrombosis)             Risks and Recommendations:  The patient has the following additional risks and recommendations for perioperative complications:  Cardiovascular:   - given new LBBB and her age, will have her do stress testing prior to clearance for surgery,  patient understands this.    Medication Instructions:   - Diuretics: HOLD on the day of surgery.    RECOMMENDATION:  APPROVAL GIVEN to proceed with proposed procedure, without further diagnostic evaluation.      Subjective     HPI related to upcoming procedure: breast hypertrophy causing back/neck pain      Preop Questions 1/28/2021   1. Have you ever had a heart attack or stroke? No   2. Have you ever had surgery on your heart or blood vessels, such as a stent placement, a coronary artery bypass, or surgery on an artery in your head, neck, heart, or legs? No   3. Do you have chest pain with activity? No   4. Do you have a history of  heart failure? No   5. Do you currently have a cold, bronchitis or symptoms of other infection? No   6. Do you have a cough, shortness of breath, or wheezing? No   7. Do you or anyone in your family have previous history of blood clots? No   8. Do you or does anyone in your family have a serious bleeding problem such as prolonged bleeding following surgeries or cuts? No   9. Have you ever had problems with anemia or been told to take iron pills? No   10. Have you had any abnormal blood loss such as black, tarry or bloody stools, or abnormal vaginal bleeding? No   11. Have you ever had a blood transfusion? No   12. Are you willing to have a blood transfusion if it is medically needed before, during, or after your surgery? Yes   13. Have you or any of your relatives ever had problems with anesthesia? No   14. Do you have sleep apnea, excessive snoring or daytime drowsiness? No   15. Do you have any artifical heart valves or other implanted medical devices like a pacemaker, defibrillator, or continuous glucose monitor? No   16. Do you have artificial joints? YES - knees   17. Are you allergic to latex? No     Health Care Directive:  Patient does not have a Health Care Directive or Living Will:     Preoperative Review of :   reviewed - no record of controlled substances  prescribed.      Status of Chronic Conditions:  See problem list for active medical problems.  Problems all longstanding and stable, except as noted/documented.  See ROS for pertinent symptoms related to these conditions.        Review of Systems  CONSTITUTIONAL: NEGATIVE for fever, chills, change in weight  ENT/MOUTH: NEGATIVE for ear, mouth and throat problems  RESP: NEGATIVE for significant cough or SOB  CV: NEGATIVE for chest pain, palpitations or peripheral edema    Patient Active Problem List    Diagnosis Date Noted     Hypertrophy of breast 09/29/2021     Priority: Medium     Added automatically from request for surgery [id number]       Moderate major depression (H) 04/13/2021     Priority: Medium     Benign essential hypertension 11/26/2019     Priority: Medium     Personal history of DVT (deep vein thrombosis) 11/26/2019     Priority: Medium     After knee surgery in 2014, wasn't taken her post op Xarelto as prescribed for prevention and was on HRT at that time.       Non morbid obesity 12/19/2016     Priority: Medium     Hypothyroidism, unspecified type 09/19/2016     Priority: Medium     Osteopenia 07/15/2015     Priority: Medium     Status post total knee replacement RIGHT 10/30/2014     Priority: Medium     Enchondroma of right femur 07/16/2014     Priority: Medium     BRUCE (obstructive sleep apnea) 04/19/2013     Priority: Medium     Mild without significant desaturations.  Currently not using CPAP.       Health Care Home 12/17/2012     Priority: Medium     Susan Larsen RN-PHN  FPA / FMG [facility]   [phone]    DX V65.8 REPLACED WITH 55211 HEALTH CARE HOME (04/08/2013)       Advanced directives, counseling/discussion 10/17/2011     Priority: Medium     Patient has completed an Advance/Health Care Directive (HCD), to bring in copy to be scanned into Epic.    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     Past Surgical History:   Procedure Laterality Date     ARTHROPLASTY KNEE Right 10/30/2014    Procedure: ARTHROPLASTY KNEE;  Surgeon: Fabian Deras MD;  Location: [facility] OR     ARTHROSCOPY KNEE Right 8/20/2015    Procedure: ARTHROSCOPY KNEE;  Surgeon: Fabian Deras MD;  Location: [facility] OR     BREAST LUMPECTOMY, RT/LT  1986    LT non-ca     C/SECTION, CLASSICAL  1973     COLONOSCOPY  1/2007    repeat in 10 years     HC REVISION GASTROPLASTY,OBESITY, NON-OBDULIA RESTRICT DEVICE  1979     IR MISCELLANEOUS PROCEDURE  7/27/2009     LAPAROSCOPIC HERNIORRHAPHY VENTRAL  11/21/2011    Procedure:LAPAROSCOPIC HERNIORRHAPHY VENTRAL; Laparoscopic Left Spigelian Herniorraphy With Mesh & Repair of Left Lower Quadrant Hernia With  Mesh; Surgeon:TODD PHAN; Location:[facility] OR     SURGICAL HISTORY OF -   7/27/2009    Left Heart Cath, Left Ventriculography, Coronary Angiogram, Closure [place], [facility]     TUBAL LIGATION       Current Outpatient Medications   Medication Sig Dispense Refill     buPROPion (WELLBUTRIN XL) 150 MG 24 hr tablet TAKE 1 TABLET EVERY MORNING 90 tablet 3     CALTRATE 600+D 600-400 MG-UNIT PO TABS Take one tablet by mouth every day       celecoxib (CELEBREX) 200 MG capsule TAKE 1 CAPSULE DAILY (DISCONTINUE IBUPROFEN) 90 capsule 3     CENTRUM SILVER PO TABS Take one tablet by mouth every day  3     hydrochlorothiazide (HYDRODIURIL) 12.5 MG tablet Take 12.5 mg by mouth daily       levothyroxine (SYNTHROID/LEVOTHROID) 125 MCG tablet TAKE 1 TABLET DAILY (DISCONTINUE LEVOTHYROXINE 137 MCG) 90 tablet 3     topiramate (TOPAMAX) 50 MG tablet TAKE 1 TABLET TWICE A  tablet 3     VITAMIN D3 1000 UNIT PO CAPS 5000mg tablet,  1tab daily       triamterene-HCTZ (DYAZIDE) 37.5-25 MG capsule Take 1 capsule by mouth daily (Patient not taking: Reported on 9/24/2021) 90  "capsule 0       Allergies   Allergen Reactions     Codeine Itching        Social History     Tobacco Use     Smoking status: Never Smoker     Smokeless tobacco: Never Used   Substance Use Topics     Alcohol use: Not Currently     Family History   Problem Relation Age of Onset     Respiratory Mother         emphysema age 78     Heart Disease Father         MI   ATGE 76     Thyroid Disease Brother      History   Drug Use No         Objective     /50   Pulse 89   Temp 98.5  F (36.9  C) (Tympanic)   Resp 16   Ht 1.626 m (5' 4\")   Wt 87.5 kg (193 lb)   LMP  (LMP Unknown)   SpO2 96%   Breastfeeding No   BMI 33.13 kg/m      Physical Exam  GENERAL APPEARANCE: healthy, alert and no distress  HENT: ear canals and TM's normal and nose and mouth without ulcers or lesions  RESP: lungs clear to auscultation - no rales, rhonchi or wheezes  CV: regular rate and rhythm, normal S1 S2, no S3 or S4 and no murmur, click or rub   ABDOMEN: soft, nontender, no HSM or masses and bowel sounds normal  NEURO: Normal strength and tone, sensory exam grossly normal, mentation intact and speech normal    Recent Labs   Lab Test 01/28/21  0958 09/23/20  1537 04/27/20  1419 11/26/19  0936 11/08/19  0915 11/08/19  0915   HGB 11.7  --   --  12.5   < > 12.2   PLT  --   --   --  263  --  235    130*   < > 140   < > 142   POTASSIUM 4.2 4.3   < > 4.2   < > 4.4   CR 0.91 0.84   < > 0.84   < > 0.71    < > = values in this interval not displayed.        Diagnostics:  Labs pending at this time.  Results will be reviewed when available.   EKG: Left Bundle Branch Block, this is a change from last ekg 11/2019    Revised Cardiac Risk Index (RCRI):  The patient has the following serious cardiovascular risks for perioperative complications:   - No serious cardiac risks = 0 points     RCRI Interpretation: 0 points: Class I (very low risk - 0.4% complication rate)         Signed Electronically by: Tomasa Rios MD  Copy of this evaluation " report is provided to requesting physician.

## 2021-10-07 NOTE — RESULT ENCOUNTER NOTE
Marilee,      Your kidney function is very slightly reduced, however, this change often occurs over time, we will keep an eye on this.     Avoid NSAIDS like ibuprofen/aleve.  The Celebrex is also an NSAID, while I know this is helpful for you, it may be leading to some kidney issues over time.        Please contact my office if you have questions.    Tomasa Rios M.D.

## 2021-10-08 ENCOUNTER — MYC MEDICAL ADVICE (OUTPATIENT)
Dept: FAMILY MEDICINE | Facility: CLINIC | Age: 76
End: 2021-10-08

## 2021-10-08 NOTE — TELEPHONE ENCOUNTER
Patient called and asked for other locations.    Called [facility] their first opening is Oct 28th    [facility].    Patient notified and pre-procedure instructions given.

## 2021-10-18 ENCOUNTER — LAB (OUTPATIENT)
Dept: LAB | Facility: CLINIC | Age: 76
End: 2021-10-18
Payer: COMMERCIAL

## 2021-10-18 ENCOUNTER — ANCILLARY PROCEDURE (OUTPATIENT)
Dept: MAMMOGRAPHY | Facility: CLINIC | Age: 76
End: 2021-10-18
Payer: COMMERCIAL

## 2021-10-18 DIAGNOSIS — Z11.59 ENCOUNTER FOR SCREENING FOR OTHER VIRAL DISEASES: ICD-10-CM

## 2021-10-18 DIAGNOSIS — Z12.31 VISIT FOR SCREENING MAMMOGRAM: ICD-10-CM

## 2021-10-18 PROCEDURE — U0003 INFECTIOUS AGENT DETECTION BY NUCLEIC ACID (DNA OR RNA); SEVERE ACUTE RESPIRATORY SYNDROME CORONAVIRUS 2 (SARS-COV-2) (CORONAVIRUS DISEASE [COVID-19]), AMPLIFIED PROBE TECHNIQUE, MAKING USE OF HIGH THROUGHPUT TECHNOLOGIES AS DESCRIBED BY CMS-2020-01-R: HCPCS

## 2021-10-18 PROCEDURE — 77067 SCR MAMMO BI INCL CAD: CPT | Mod: TC | Performed by: RADIOLOGY

## 2021-10-18 PROCEDURE — 77063 BREAST TOMOSYNTHESIS BI: CPT | Mod: TC | Performed by: RADIOLOGY

## 2021-10-18 PROCEDURE — U0005 INFEC AGEN DETEC AMPLI PROBE: HCPCS

## 2021-10-19 ENCOUNTER — HOSPITAL ENCOUNTER (OUTPATIENT)
Dept: NUCLEAR MEDICINE | Facility: CLINIC | Age: 76
Setting detail: NUCLEAR MEDICINE
End: 2021-10-19
Attending: FAMILY MEDICINE
Payer: COMMERCIAL

## 2021-10-19 ENCOUNTER — HOSPITAL ENCOUNTER (OUTPATIENT)
Dept: CARDIOLOGY | Facility: CLINIC | Age: 76
End: 2021-10-19
Attending: FAMILY MEDICINE
Payer: COMMERCIAL

## 2021-10-19 DIAGNOSIS — I44.7 LBBB (LEFT BUNDLE BRANCH BLOCK): ICD-10-CM

## 2021-10-19 DIAGNOSIS — R94.31 ABNORMAL ELECTROCARDIOGRAM (ECG) (EKG): ICD-10-CM

## 2021-10-19 DIAGNOSIS — Z01.818 PREOP GENERAL PHYSICAL EXAM: ICD-10-CM

## 2021-10-19 DIAGNOSIS — I10 BENIGN ESSENTIAL HYPERTENSION: ICD-10-CM

## 2021-10-19 PROBLEM — F32.9 MAJOR DEPRESSION: Status: ACTIVE | Noted: 2021-04-13

## 2021-10-19 LAB
CV STRESS MAX HR HE: 93 BPM
NUC STRESS EJECTION FRACTION: 60 %
RATE PRESSURE PRODUCT: NORMAL
SARS-COV-2 RNA RESP QL NAA+PROBE: NEGATIVE
STRESS ECHO BASELINE DIASTOLIC HE: 90
STRESS ECHO BASELINE HR: 71 BPM
STRESS ECHO BASELINE SYSTOLIC BP: 154
STRESS ECHO CALCULATED PERCENT HR: 64 %
STRESS ECHO LAST STRESS DIASTOLIC BP: 85
STRESS ECHO LAST STRESS SYSTOLIC BP: 135
STRESS ECHO TARGET HR: 145

## 2021-10-19 PROCEDURE — 250N000011 HC RX IP 250 OP 636

## 2021-10-19 PROCEDURE — 93018 CV STRESS TEST I&R ONLY: CPT | Performed by: INTERNAL MEDICINE

## 2021-10-19 PROCEDURE — 78452 HT MUSCLE IMAGE SPECT MULT: CPT | Mod: 26 | Performed by: INTERNAL MEDICINE

## 2021-10-19 PROCEDURE — 78452 HT MUSCLE IMAGE SPECT MULT: CPT

## 2021-10-19 PROCEDURE — A9502 TC99M TETROFOSMIN: HCPCS | Performed by: FAMILY MEDICINE

## 2021-10-19 PROCEDURE — 93017 CV STRESS TEST TRACING ONLY: CPT

## 2021-10-19 PROCEDURE — 93016 CV STRESS TEST SUPVJ ONLY: CPT | Performed by: INTERNAL MEDICINE

## 2021-10-19 PROCEDURE — 343N000001 HC RX 343: Performed by: FAMILY MEDICINE

## 2021-10-19 RX ORDER — REGADENOSON 0.08 MG/ML
INJECTION, SOLUTION INTRAVENOUS
Status: COMPLETED
Start: 2021-10-19 | End: 2021-10-19

## 2021-10-19 RX ORDER — CAFFEINE CITRATE 20 MG/ML
60 SOLUTION INTRAVENOUS
Status: DISCONTINUED | OUTPATIENT
Start: 2021-10-19 | End: 2021-10-20 | Stop reason: HOSPADM

## 2021-10-19 RX ORDER — AMINOPHYLLINE 25 MG/ML
50-100 INJECTION, SOLUTION INTRAVENOUS
Status: DISCONTINUED | OUTPATIENT
Start: 2021-10-19 | End: 2021-10-20 | Stop reason: HOSPADM

## 2021-10-19 RX ORDER — REGADENOSON 0.08 MG/ML
0.4 INJECTION, SOLUTION INTRAVENOUS ONCE
Status: COMPLETED | OUTPATIENT
Start: 2021-10-19 | End: 2021-10-19

## 2021-10-19 RX ORDER — ACYCLOVIR 200 MG/1
0-1 CAPSULE ORAL
Status: DISCONTINUED | OUTPATIENT
Start: 2021-10-19 | End: 2021-10-20 | Stop reason: HOSPADM

## 2021-10-19 RX ORDER — ALBUTEROL SULFATE 90 UG/1
2 AEROSOL, METERED RESPIRATORY (INHALATION) EVERY 5 MIN PRN
Status: DISCONTINUED | OUTPATIENT
Start: 2021-10-19 | End: 2021-10-20 | Stop reason: HOSPADM

## 2021-10-19 RX ADMIN — TETROFOSMIN 10.7 MCI.: 1.38 INJECTION, POWDER, LYOPHILIZED, FOR SOLUTION INTRAVENOUS at 08:54

## 2021-10-19 RX ADMIN — REGADENOSON 0.4 MG: 0.08 INJECTION, SOLUTION INTRAVENOUS at 10:23

## 2021-10-19 RX ADMIN — TETROFOSMIN 31.5 MCI.: 1.38 INJECTION, POWDER, LYOPHILIZED, FOR SOLUTION INTRAVENOUS at 10:19

## 2021-10-20 ENCOUNTER — E-CONSULT (OUTPATIENT)
Dept: CARDIOLOGY | Facility: CLINIC | Age: 76
End: 2021-10-20
Payer: COMMERCIAL

## 2021-10-20 DIAGNOSIS — R94.39 ABNORMAL CARDIOVASCULAR STRESS TEST: Primary | ICD-10-CM

## 2021-10-20 PROCEDURE — 99451 NTRPROF PH1/NTRNET/EHR 5/>: CPT | Performed by: INTERNAL MEDICINE

## 2021-10-20 PROCEDURE — 99207 E-CONSULT TO CARDIOLOGY (ADULT OUTPT PROVIDER TO SPECIALIST WRITTEN QUESTION & RESPONSE): CPT | Performed by: FAMILY MEDICINE

## 2021-10-20 NOTE — PROGRESS NOTES
Talked with patient - referred to cardiology.   Will put her surgery on hold at this time.    Tomasa Rios M.D.

## 2021-10-20 NOTE — PROGRESS NOTES
74 yo female with hypertension, in for preoperative evaluation on 10/6 and EKG showed new LBBB (change from EKG 11/2019).  She is asymptomatic with 4 METS of activity.   She is scheduled for breast reduction mammoplasty on 10/22/2021 and would like to go through with surgery.  Will e-consult cardiology for their input as well as have her do a formal consultation in the next few weeks.      10/19/2021 Lexiscan:       The nuclear stress test is abnormal.     There is nontransmural infarction in the anterior, apical and anteroseptal segment(s) of the left ventricle.     Left ventricular function is normal.     The left ventricular ejection fraction at stress is 60%.     There is no prior study for comparison.      Tomasa Rios M.D.

## 2021-11-02 NOTE — PROGRESS NOTES
CARDIOLOGY CONSULT    REASON FOR CONSULT: Left bundle branch block, abnormal stress    PRIMARY CARE PHYSICIAN:  Tomasa Rios    HISTORY OF PRESENT ILLNESS:  75-year-old female seen for abnormal stress test.  She has hypertension, DVT, hypothyroid, sleep apnea.    She has been feeling quite well recently.  She will do some light walking with no chest pain.  She has some minimal dyspnea with exertion.  She denies any lightheadedness, dizziness, or palpitations.    She is scheduled for a breast reduction surgery.  Preop EKG showed a left bundle branch block which is new.    Nuclear stress October 2021 showed nontransmural infarct of the anterior, apical, and anteroseptal segments, no ischemia, EF 60%.    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       ALLERGIES:  Allergies   Allergen Reactions     Codeine Itching       SOCIAL HISTORY:  I have reviewed this patient's social history and updated it with pertinent information if needed. Marilee Marks  reports that she has never smoked. She has never used smokeless tobacco. She reports previous alcohol use. She reports that she does not use drugs.    FAMILY HISTORY:  I have reviewed this patient's family history and updated it with pertinent information if needed.   Family History   Problem Relation Age of Onset     Respiratory Mother         emphysema age 78     Heart Disease Father         MI   ATGE 76     Thyroid Disease Brother         REVIEW OF SYSTEMS:  Constitutional:  No weight loss, fever, chills, weakness or fatigue.  HEENT:  Eyes:  No visual loss, blurred vision, double vision or yellow sclerae. No hearing loss, sneezing, congestion, runny nose or sore throat.  Skin:  No rash or itching.  Cardiovascular: per HPI  Respiratory: per HPI  GI:  No anorexia, nausea, vomiting or diarrhea. No abdominal pain or blood.  :  No dysurea, hematuria  Neurologic:  No headache, dizziness, syncope, paralysis, ataxia, numbness or tingling in the extremities. No change in bowel or bladder control.  Musculoskeletal:  No muscle, back pain, joint pain or stiffness.  Hematologic:  No anemia, bleeding or bruising.  Lymphatics:  No enlarged nodes. No history of splenectomy.  Psychiatric:  No history of depression or anxiety.  Endocrine:  No reports of sweating, cold or heat intolerance. No polyuria or polydipsia.  Allergies:  No history of asthma, hives, eczema or rhinitis.    PHYSICAL EXAM:  /63   Pulse 70   Wt 86.1 kg (189 lb 12.8 oz)   LMP  (LMP Unknown)   BMI 32.58 kg/m      Constitutional: awake, alert, no distress  Eyes: PERRL, sclera nonicteric  ENT: trachea midline  Respiratory: Lungs clear  Cardiovascular: Regular rate and rhythm, no murmurs  GI: nondistended, nontender, bowel sounds present  Lymph/Hematologic: no lymphadenopathy  Skin: dry, no rash  Musculoskeletal: good muscle tone, strength 5/5 in upper and lower extremities  Neurologic: no focal deficits  Neuropsychiatric: appropriate affact    DATA:  Labs:   Recent Labs   Lab Test 09/23/20  1537 02/03/14  1057   CHOL 194 192   HDL 79 46*   * 122   TRIG 67 118   CHOLHDLRATIO  --  4.0     October 6, 2021: Potassium 4.2, creatinine 0.9    EKG, October 2021: Sinus rhythm, left bundle branch block.  Left bundle branch block is new compared to EKG from November 2019.    ASSESSMENT:  75-year-old female seen for left bundle branch block and abnormal stress test.  She has no anginal or  chest pain type symptoms.  The nuclear stress does have an anteroseptal fixed defect, this could be infarct, but could be artifact from the left bundle branch block.  Coronary CTA will be done.  Echo will be done as well to check ejection fraction.    If there is no obstructive coronary disease, she would be low risk for her breast reduction surgery.    RECOMMENDATIONS:  1.  Left bundle branch block with abnormal stress test  -Coronary CTA, take metoprolol 50 mg the night before and the morning of the study  -Echocardiogram    Follow-up as needed based on test results.    Earnest Pierce MD  Cardiology - [facility]  Pager:  [phone]  Text Page  November 4, 2021    Addendum:   Coronary CTA showed mild to moderate diffuse coronary disease, calcium score 400, 83rd percentile.  There is a very small OM branch with possibly more severe stenosis, but this is too small for any mechanical intervention.    Her nuclear stress abnormality was abnormal due to the left bundle branch block, there is no evidence of LAD disease on CTA.    She would be lower cardiac risk for breast reduction surgery.  No new cardiology recommendations.    She will follow-up in the cardiology clinic in about 1 year for risk factor management.    Earnest Pierce MD  Cardiology - [facility]  Pager: [phone]  Text Page  November 9, 2021

## 2021-11-04 ENCOUNTER — OFFICE VISIT (OUTPATIENT)
Dept: CARDIOLOGY | Facility: CLINIC | Age: 76
End: 2021-11-04
Payer: COMMERCIAL

## 2021-11-04 VITALS
BODY MASS INDEX: 32.58 KG/M2 | SYSTOLIC BLOOD PRESSURE: 132 MMHG | WEIGHT: 189.8 LBS | HEART RATE: 70 BPM | DIASTOLIC BLOOD PRESSURE: 63 MMHG

## 2021-11-04 DIAGNOSIS — I44.7 LBBB (LEFT BUNDLE BRANCH BLOCK): Primary | ICD-10-CM

## 2021-11-04 DIAGNOSIS — R94.39 ABNORMAL CARDIOVASCULAR STRESS TEST: ICD-10-CM

## 2021-11-04 PROCEDURE — 99204 OFFICE O/P NEW MOD 45 MIN: CPT | Performed by: INTERNAL MEDICINE

## 2021-11-04 RX ORDER — METOPROLOL TARTRATE 50 MG
50 TABLET ORAL DAILY PRN
Qty: 2 TABLET | Refills: 0 | Status: SHIPPED | OUTPATIENT
Start: 2021-11-04 | End: 2021-12-28

## 2021-11-04 NOTE — PATIENT INSTRUCTIONS
Your recent EKG showed you have a left bundle branch block pattern.  This means the electrical part of your heart is conducting more slowly in the lower left chamber.  This can be an incidental finding, but sometimes this can be from a blockage in the heart artery or some other problem.    I would recommend doing an echocardiogram, which is an ultrasound of the heart, this looks at the heart function and the valves.    Your nuclear stress test had an abnormality, however this may be due to the left bundle branch block, rather than a true blockage.  I would like to do a CT scan of the heart where we get a look at the arteries to see if there is any severe blockage.    Echocardiogram  Will schedule an echocardiogram, or ultrasound of the heart.  This looks at the size and function of the heart and valves.  It generally takes about 20-30 minutes.  This will tell if there is any reduced heart function or problem with any of the valves.    CT Angiogram    Will schedule a coronary CTA.  This is a CT scan where we take pictures of your heart while contrast is injected through an IV in the vein.  The contrast fills the arteries of the heart and we are able to get a good look at how much plaque or blockage there may be in the coronary arteries.  The amount of plaque will also be added up and reported as a calcium score.  This test cannot give specific percentiles of a blockage, but can tell if there is a mild, moderate, or severe blockage.  If there are any severe blockages, usually the next step is to discuss a coronary angiogram, which is the definitive test to look at the heart arteries and fix any severe blockages by placing a stent.    We need your heart rate to be slow, preferably less than 60 bpm to get good picture quality.  Take metoprolol 50 mg the night before and the morning of the test.  This will slow the heart rate for a good quality scan.    Your procedure will be done at [facility]  "[address].   3rd floor of [facility]~[address]~[place], [place]. Please park in the  [address]  ramp on the west side of [facility] on [address]. Take the skyway over [facility] to the hospital. Go up one floor to the 3rd floor, where your procedure will be done.  How do I get ready for this exam?  Please allow two hours for this test. Bring any scans or x-rays taken at other hospitals if similar tests were done. Also bring a current list of your medications including vitamins, minerals and over-the-counter medications. It is safest to leave personal items at home.     The day before your exam, drink extra fluids, water is best. Drink at least six 8 oz glasses unless your doctor has asked you to restrict your fluid intake.    No caffeine AND No smoking the day of your test.    Do not eat or drink for 3 hours before your exam.    If you are not on a beta blocker medication you will be taking 2 doses of a medication called, \"Metoprolol\". Take 50 mg the evening before your procedure and 50 mg the morning of your procedure. This medication is used to slow your heart rate so we can get clear pictures.    You may have or need a blood test (creatinine test) within 30 days of your exam. Staff will let you know when you are there.     You may take your morning medications with a small sip of water unless below.    Do not take diuretics (furosemide; HCTZ; torsemide; bumex) the morning of your test.    Do not take NSAIDS (ibuprofen; naproxen; indomethacin) the morning of your test.    Do not take your oral diabetic medication(s) the day of your test. Restart these medication(s) 48 hours after testing.    Do not take Viagra, Levitra or Cialis for 48 hours prior to your test.  Be sure to tell your doctor:    If you have any allergies, including any reaction to contrast.     If there is a chance you may be pregnant.    If you are breastfeeding.    If you have any special needs.  What happens during the " exam?  Please wear loose clothing such as a sweat suit or jogging clothes. Avoid snaps, zippers and other metal. We may have you undress and put on a hospital gown.    We will place pads (EKG leads) on your chest.    You will lie on a table with your arms extended above your head. The table will move through the scanner. The scanner is a short tube. You will not be enclosed.    We will place a small needle (IV) into a vein in your arm. The contrast fluid flows through this needle. You may received medicine to slow your heart rate as well.    The scanner will take a series of pictures.You must lie very still during this time but you can talk to staff via a 2-way speaker system.  Is it safe?  As with any scan that uses radiation there is a very small increased risk of cancer. Your doctor orders a scan when he or she feels the potential benefits outweigh the risks of the scan. Please talk with your doctor if you have any concerns before your exam.  When will I know the result?  The doctor who did your exam and the radiologist (x-ray doctor) will prepare a report of your results. The report will be sent to your doctor who will discuss the result with you.  Who should I call with questions?  Please call the [facility] diagnostic imaging scheduling department at [phone]  with any questions.

## 2021-11-04 NOTE — LETTER
11/4/2021    Tomasa Rios MD  [address]  [facility] [address]    RE: Marilee Marks       Dear Colleague,    I had the pleasure of seeing Marilee Marks in the [facility].  CARDIOLOGY CONSULT    REASON FOR CONSULT: Left bundle branch block, abnormal stress    PRIMARY CARE PHYSICIAN:  Tomasa Rios    HISTORY OF PRESENT ILLNESS:  75-year-old female seen for abnormal stress test.  She has hypertension, DVT, hypothyroid, sleep apnea.    She has been feeling quite well recently.  She will do some light walking with no chest pain.  She has some minimal dyspnea with exertion.  She denies any lightheadedness, dizziness, or palpitations.    She is scheduled for a breast reduction surgery.  Preop EKG showed a left bundle branch block which is new.    Nuclear stress October 2021 showed nontransmural infarct of the anterior, apical, and anteroseptal segments, no ischemia, EF 60%.    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       ALLERGIES:  Allergies   Allergen Reactions     Codeine Itching       SOCIAL HISTORY:  I have reviewed this patient's social history and updated it with pertinent information if needed. Marilee Marks  reports that she has never smoked. She has never used smokeless tobacco. She reports previous alcohol use. She reports that she does not use drugs.    FAMILY HISTORY:  I have  reviewed this patient's family history and updated it with pertinent information if needed.   Family History   Problem Relation Age of Onset     Respiratory Mother         emphysema age 78     Heart Disease Father         MI   ATGE 76     Thyroid Disease Brother        REVIEW OF SYSTEMS:  Constitutional:  No weight loss, fever, chills, weakness or fatigue.  HEENT:  Eyes:  No visual loss, blurred vision, double vision or yellow sclerae. No hearing loss, sneezing, congestion, runny nose or sore throat.  Skin:  No rash or itching.  Cardiovascular: per HPI  Respiratory: per HPI  GI:  No anorexia, nausea, vomiting or diarrhea. No abdominal pain or blood.  :  No dysurea, hematuria  Neurologic:  No headache, dizziness, syncope, paralysis, ataxia, numbness or tingling in the extremities. No change in bowel or bladder control.  Musculoskeletal:  No muscle, back pain, joint pain or stiffness.  Hematologic:  No anemia, bleeding or bruising.  Lymphatics:  No enlarged nodes. No history of splenectomy.  Psychiatric:  No history of depression or anxiety.  Endocrine:  No reports of sweating, cold or heat intolerance. No polyuria or polydipsia.  Allergies:  No history of asthma, hives, eczema or rhinitis.    PHYSICAL EXAM:  /63   Pulse 70   Wt 86.1 kg (189 lb 12.8 oz)   LMP  (LMP Unknown)   BMI 32.58 kg/m      Constitutional: awake, alert, no distress  Eyes: PERRL, sclera nonicteric  ENT: trachea midline  Respiratory: Lungs clear  Cardiovascular: Regular rate and rhythm, no murmurs  GI: nondistended, nontender, bowel sounds present  Lymph/Hematologic: no lymphadenopathy  Skin: dry, no rash  Musculoskeletal: good muscle tone, strength 5/5 in upper and lower extremities  Neurologic: no focal deficits  Neuropsychiatric: appropriate affact    DATA:  Labs:   Recent Labs   Lab Test 09/23/20  1537 02/03/14  1057   CHOL 194 192   HDL 79 46*   * 122   TRIG 67 118   CHOLHDLRATIO  --  4.0     October 6, 2021: Potassium 4.2,  creatinine 0.9    EKG, October 2021: Sinus rhythm, left bundle branch block.  Left bundle branch block is new compared to EKG from November 2019.    ASSESSMENT:  75-year-old female seen for left bundle branch block and abnormal stress test.  She has no anginal or chest pain type symptoms.  The nuclear stress does have an anteroseptal fixed defect, this could be infarct, but could be artifact from the left bundle branch block.  Coronary CTA will be done.  Echo will be done as well to check ejection fraction.    If there is no obstructive coronary disease, she would be low risk for her breast reduction surgery.    RECOMMENDATIONS:  1.  Left bundle branch block with abnormal stress test  -Coronary CTA, take metoprolol 50 mg the night before and the morning of the study  -Echocardiogram    Follow-up as needed based on test results.    Earnest Pierce MD  Cardiology - [facility]  Pager:  [phone]  Text Page  November 4, 2021    Addendum:   Coronary CTA showed mild to moderate diffuse coronary disease, calcium score 400, 83rd percentile.  There is a very small OM branch with possibly more severe stenosis, but this is too small for any mechanical intervention.    Her nuclear stress abnormality was abnormal due to the left bundle branch block, there is no evidence of LAD disease on CTA.    She would be lower cardiac risk for breast reduction surgery.  No new cardiology recommendations.    She will follow-up in the cardiology clinic in about 1 year for risk factor management.      Earnest Pierce MD  Cardiology - [facility]  Pager: [phone]  Text Page  November 9, 2021

## 2021-11-09 ENCOUNTER — MYC MEDICAL ADVICE (OUTPATIENT)
Dept: FAMILY MEDICINE | Facility: CLINIC | Age: 76
End: 2021-11-09

## 2021-11-09 ENCOUNTER — HOSPITAL ENCOUNTER (OUTPATIENT)
Dept: CARDIOLOGY | Facility: CLINIC | Age: 76
End: 2021-11-09
Attending: INTERNAL MEDICINE
Payer: COMMERCIAL

## 2021-11-09 VITALS — DIASTOLIC BLOOD PRESSURE: 65 MMHG | SYSTOLIC BLOOD PRESSURE: 115 MMHG | HEART RATE: 57 BPM

## 2021-11-09 DIAGNOSIS — I44.7 LBBB (LEFT BUNDLE BRANCH BLOCK): Primary | ICD-10-CM

## 2021-11-09 DIAGNOSIS — I44.7 LBBB (LEFT BUNDLE BRANCH BLOCK): ICD-10-CM

## 2021-11-09 DIAGNOSIS — R94.39 ABNORMAL CARDIOVASCULAR STRESS TEST: ICD-10-CM

## 2021-11-09 LAB
CREAT BLD-MCNC: 1.1 MG/DL (ref 0.5–1)
GFR SERPL CREATININE-BSD FRML MDRD: 49 ML/MIN/1.73M2

## 2021-11-09 PROCEDURE — 82565 ASSAY OF CREATININE: CPT

## 2021-11-09 PROCEDURE — 258N000003 HC RX IP 258 OP 636: Performed by: INTERNAL MEDICINE

## 2021-11-09 PROCEDURE — 250N000011 HC RX IP 250 OP 636: Performed by: INTERNAL MEDICINE

## 2021-11-09 PROCEDURE — 250N000013 HC RX MED GY IP 250 OP 250 PS 637: Performed by: INTERNAL MEDICINE

## 2021-11-09 PROCEDURE — 75574 CT ANGIO HRT W/3D IMAGE: CPT | Mod: 26 | Performed by: INTERNAL MEDICINE

## 2021-11-09 PROCEDURE — 75574 CT ANGIO HRT W/3D IMAGE: CPT

## 2021-11-09 RX ORDER — DILTIAZEM HYDROCHLORIDE 5 MG/ML
10-15 INJECTION INTRAVENOUS
Status: DISCONTINUED | OUTPATIENT
Start: 2021-11-09 | End: 2021-11-10 | Stop reason: HOSPADM

## 2021-11-09 RX ORDER — DILTIAZEM HCL 60 MG
120 TABLET ORAL
Status: DISCONTINUED | OUTPATIENT
Start: 2021-11-09 | End: 2021-11-10 | Stop reason: HOSPADM

## 2021-11-09 RX ORDER — ONDANSETRON 2 MG/ML
4 INJECTION INTRAMUSCULAR; INTRAVENOUS
Status: DISCONTINUED | OUTPATIENT
Start: 2021-11-09 | End: 2021-11-10 | Stop reason: HOSPADM

## 2021-11-09 RX ORDER — NITROGLYCERIN 0.4 MG/1
0.4 TABLET SUBLINGUAL
Status: DISCONTINUED | OUTPATIENT
Start: 2021-11-09 | End: 2021-11-10 | Stop reason: HOSPADM

## 2021-11-09 RX ORDER — IOPAMIDOL 755 MG/ML
500 INJECTION, SOLUTION INTRAVASCULAR ONCE
Status: COMPLETED | OUTPATIENT
Start: 2021-11-09 | End: 2021-11-09

## 2021-11-09 RX ORDER — METOPROLOL TARTRATE 25 MG/1
25-100 TABLET, FILM COATED ORAL
Status: DISCONTINUED | OUTPATIENT
Start: 2021-11-09 | End: 2021-11-10 | Stop reason: HOSPADM

## 2021-11-09 RX ORDER — DIPHENHYDRAMINE HYDROCHLORIDE 50 MG/ML
25-50 INJECTION INTRAMUSCULAR; INTRAVENOUS
Status: DISCONTINUED | OUTPATIENT
Start: 2021-11-09 | End: 2021-11-10 | Stop reason: HOSPADM

## 2021-11-09 RX ORDER — ACYCLOVIR 200 MG/1
0-1 CAPSULE ORAL
Status: DISCONTINUED | OUTPATIENT
Start: 2021-11-09 | End: 2021-11-10 | Stop reason: HOSPADM

## 2021-11-09 RX ORDER — METHYLPREDNISOLONE SODIUM SUCCINATE 125 MG/2ML
125 INJECTION, POWDER, LYOPHILIZED, FOR SOLUTION INTRAMUSCULAR; INTRAVENOUS
Status: DISCONTINUED | OUTPATIENT
Start: 2021-11-09 | End: 2021-11-10 | Stop reason: HOSPADM

## 2021-11-09 RX ORDER — IVABRADINE 5 MG/1
5-15 TABLET, FILM COATED ORAL
Status: DISCONTINUED | OUTPATIENT
Start: 2021-11-09 | End: 2021-11-10 | Stop reason: HOSPADM

## 2021-11-09 RX ORDER — DIPHENHYDRAMINE HCL 25 MG
25 CAPSULE ORAL
Status: DISCONTINUED | OUTPATIENT
Start: 2021-11-09 | End: 2021-11-10 | Stop reason: HOSPADM

## 2021-11-09 RX ORDER — METOPROLOL TARTRATE 1 MG/ML
5-15 INJECTION, SOLUTION INTRAVENOUS
Status: DISCONTINUED | OUTPATIENT
Start: 2021-11-09 | End: 2021-11-10 | Stop reason: HOSPADM

## 2021-11-09 RX ADMIN — SODIUM CHLORIDE 100 ML: 9 INJECTION, SOLUTION INTRAVENOUS at 11:30

## 2021-11-09 RX ADMIN — NITROGLYCERIN 0.4 MG: 0.4 TABLET SUBLINGUAL at 11:22

## 2021-11-09 RX ADMIN — IOPAMIDOL 120 ML: 755 INJECTION, SOLUTION INTRAVENOUS at 11:30

## 2021-11-10 ENCOUNTER — TELEPHONE (OUTPATIENT)
Dept: SURGERY | Facility: CLINIC | Age: 76
End: 2021-11-10
Payer: COMMERCIAL

## 2021-11-10 DIAGNOSIS — N62 HYPERTROPHY OF BREAST: Primary | ICD-10-CM

## 2021-11-10 RX ORDER — CEFAZOLIN SODIUM 2 G/50ML
2 SOLUTION INTRAVENOUS SEE ADMIN INSTRUCTIONS
Status: CANCELLED | OUTPATIENT
Start: 2021-11-10

## 2021-11-10 RX ORDER — CEFAZOLIN SODIUM 2 G/50ML
2 SOLUTION INTRAVENOUS
Status: CANCELLED | OUTPATIENT
Start: 2021-11-10

## 2021-11-10 NOTE — TELEPHONE ENCOUNTER
Contacted the patient to confirm the scheduled dates and provide the following information:     Surgeon/surgery date/location:  Dr. Madrigal on 1/13 at [facility].  Arrival:   10:00 AM   Pre-op consult:   Dr. Madirgal on 1/4.   Pre-op physical with:   PCP. Patient is aware this needs to be completed within 30 days of surgery.  COVID-19 test:   1/10.  Post-op:   1/25.    The surgery packet was provided via [employer].

## 2021-11-10 NOTE — TELEPHONE ENCOUNTER
Called patient back as we had a spot open on 1/6. Patient agreed to new OR date. Rescheduled to 1/6 and rescheduled pre-op consult with Dr. Madrigal, COVID-19 test, and post-op appointment. Sent new OR packet.

## 2021-11-29 ENCOUNTER — HOSPITAL ENCOUNTER (OUTPATIENT)
Dept: CARDIOLOGY | Facility: CLINIC | Age: 76
Discharge: HOME | End: 2021-11-29
Attending: INTERNAL MEDICINE | Admitting: INTERNAL MEDICINE
Payer: COMMERCIAL

## 2021-11-29 DIAGNOSIS — I44.7 LBBB (LEFT BUNDLE BRANCH BLOCK): ICD-10-CM

## 2021-11-29 LAB — LVEF ECHO: NORMAL

## 2021-11-29 PROCEDURE — 93306 TTE W/DOPPLER COMPLETE: CPT | Mod: 26 | Performed by: INTERNAL MEDICINE

## 2021-11-29 PROCEDURE — 93306 TTE W/DOPPLER COMPLETE: CPT

## 2021-12-10 ENCOUNTER — MYC MEDICAL ADVICE (OUTPATIENT)
Dept: FAMILY MEDICINE | Facility: CLINIC | Age: 76
End: 2021-12-10
Payer: COMMERCIAL

## 2021-12-12 DIAGNOSIS — Z11.59 ENCOUNTER FOR SCREENING FOR OTHER VIRAL DISEASES: ICD-10-CM

## 2021-12-23 ENCOUNTER — OFFICE VISIT (OUTPATIENT)
Dept: FAMILY MEDICINE | Facility: CLINIC | Age: 76
End: 2021-12-23
Payer: COMMERCIAL

## 2021-12-23 VITALS
SYSTOLIC BLOOD PRESSURE: 124 MMHG | RESPIRATION RATE: 12 BRPM | WEIGHT: 194 LBS | HEART RATE: 73 BPM | BODY MASS INDEX: 33.12 KG/M2 | HEIGHT: 64 IN | TEMPERATURE: 98.8 F | DIASTOLIC BLOOD PRESSURE: 70 MMHG | OXYGEN SATURATION: 97 %

## 2021-12-23 DIAGNOSIS — N62 HYPERTROPHY OF BREAST: ICD-10-CM

## 2021-12-23 DIAGNOSIS — Z01.818 PREOP GENERAL PHYSICAL EXAM: Primary | ICD-10-CM

## 2021-12-23 DIAGNOSIS — I44.7 LBBB (LEFT BUNDLE BRANCH BLOCK): ICD-10-CM

## 2021-12-23 DIAGNOSIS — I10 BENIGN ESSENTIAL HYPERTENSION: ICD-10-CM

## 2021-12-23 LAB
ANION GAP SERPL CALCULATED.3IONS-SCNC: 4 MMOL/L (ref 3–14)
BUN SERPL-MCNC: 27 MG/DL (ref 7–30)
CALCIUM SERPL-MCNC: 10 MG/DL (ref 8.5–10.1)
CHLORIDE BLD-SCNC: 113 MMOL/L (ref 94–109)
CO2 SERPL-SCNC: 26 MMOL/L (ref 20–32)
CREAT SERPL-MCNC: 1.34 MG/DL (ref 0.52–1.04)
GFR SERPL CREATININE-BSD FRML MDRD: 41 ML/MIN/1.73M2
GLUCOSE BLD-MCNC: 109 MG/DL (ref 70–99)
POTASSIUM BLD-SCNC: 4.4 MMOL/L (ref 3.4–5.3)
SODIUM SERPL-SCNC: 143 MMOL/L (ref 133–144)

## 2021-12-23 PROCEDURE — 36415 COLL VENOUS BLD VENIPUNCTURE: CPT | Performed by: FAMILY MEDICINE

## 2021-12-23 PROCEDURE — 99214 OFFICE O/P EST MOD 30 MIN: CPT | Performed by: FAMILY MEDICINE

## 2021-12-23 PROCEDURE — 80048 BASIC METABOLIC PNL TOTAL CA: CPT | Performed by: FAMILY MEDICINE

## 2021-12-23 ASSESSMENT — MIFFLIN-ST. JEOR: SCORE: 1354.98

## 2021-12-23 NOTE — PROGRESS NOTES
[facility]  [address]  [facility] [address]  Phone: [phone]  Primary Provider: Tomasa Espinal  Pre-op Performing Provider: TOMASA ESPINAL      PREOPERATIVE EVALUATION:  Today's date: 12/23/2021    Marilee Marks is a 76 year old female who presents for a preoperative evaluation.    Surgical Information:  Surgery/Procedure: MAMMOPLASTY, REDUCTION, BILATERAL  Surgery Location: [facility]  Surgeon: Dr. Madrigal  Surgery Date: 1/6/21  Time of Surgery: 12:40 PM  Where patient plans to recover: At home with family  Fax number for surgical facility: Note does not need to be faxed, will be available electronically in Epic.    Type of Anesthesia Anticipated: Combined General with block    Assessment & Plan     The proposed surgical procedure is considered LOW risk.    Preop general physical exam       Hypertrophy of breast       Benign essential hypertension   well controlled  - Basic metabolic panel  (Ca, Cl, CO2, Creat, Gluc, K, Na, BUN); Future  - Basic metabolic panel  (Ca, Cl, CO2, Creat, Gluc, K, Na, BUN)    LBBB (left bundle branch block)  Cardiac w/u done and thought to be low risk for breast surgery.   No further evaluation needed.            Risks and Recommendations:  The patient has the following additional risks and recommendations for perioperative complications:   - No identified additional risk factors other than previously addressed    Medication Instructions:  Patient is to take all scheduled medications on the day of surgery    RECOMMENDATION:  APPROVAL GIVEN to proceed with proposed procedure, without further diagnostic evaluation.                      Subjective     HPI related to upcoming procedure: breast hypertrophy causing back/neck pain.     Previously scheduled surgery was cancelled due to abnormal EKG.  Patient underwent stress testing, CT angiogram and echocardiogram as well as cardiology consultation in  November.     CTA showed mild to moderate diffuse disease, calcium score 400.  Small OM branch with more severe stenosis but too small for mechanical intervention.      Nuclear stress abnormal due to LBBB and there is no evidence of LAD disease on CTA.  Cardiology feels she is low risk for breast reduction surgery.        Preop Questions 12/23/2021   1. Have you ever had a heart attack or stroke? No   2. Have you ever had surgery on your heart or blood vessels, such as a stent placement, a coronary artery bypass, or surgery on an artery in your head, neck, heart, or legs? No   3. Do you have chest pain with activity? No   4. Do you have a history of  heart failure? No   5. Do you currently have a cold, bronchitis or symptoms of other infection? No   6. Do you have a cough, shortness of breath, or wheezing? No   7. Do you or anyone in your family have previous history of blood clots? No   8. Do you or does anyone in your family have a serious bleeding problem such as prolonged bleeding following surgeries or cuts? No   9. Have you ever had problems with anemia or been told to take iron pills? No   10. Have you had any abnormal blood loss such as black, tarry or bloody stools, or abnormal vaginal bleeding? No   11. Have you ever had a blood transfusion? No   12. Are you willing to have a blood transfusion if it is medically needed before, during, or after your surgery? Yes   13. Have you or any of your relatives ever had problems with anesthesia? No   14. Do you have sleep apnea, excessive snoring or daytime drowsiness? No   15. Do you have any artifical heart valves or other implanted medical devices like a pacemaker, defibrillator, or continuous glucose monitor? No   16. Do you have artificial joints? YES - Knees   17. Are you allergic to latex? No     Health Care Directive:  Patient does not have a Health Care Directive or Living Will:     Preoperative Review of :   reviewed - no record of controlled  substances prescribed.      Status of Chronic Conditions:  See problem list for active medical problems.  Problems all longstanding and stable, except as noted/documented.  See ROS for pertinent symptoms related to these conditions.      Review of Systems  CONSTITUTIONAL: NEGATIVE for fever, chills, change in weight  ENT/MOUTH: NEGATIVE for ear, mouth and throat problems  RESP: NEGATIVE for significant cough or SOB  CV: NEGATIVE for chest pain, palpitations or peripheral edema    Patient Active Problem List    Diagnosis Date Noted     Hypertrophy of breast 09/29/2021     Priority: Medium     Added automatically from request for surgery [id number]       Moderate major depression (H) 04/13/2021     Priority: Medium     Benign essential hypertension 11/26/2019     Priority: Medium     Personal history of DVT (deep vein thrombosis) 11/26/2019     Priority: Medium     After knee surgery in 2014, wasn't taken her post op Xarelto as prescribed for prevention and was on HRT at that time.       Non morbid obesity 12/19/2016     Priority: Medium     Hypothyroidism, unspecified type 09/19/2016     Priority: Medium     Osteopenia 07/15/2015     Priority: Medium     Status post total knee replacement RIGHT 10/30/2014     Priority: Medium     Enchondroma of right femur 07/16/2014     Priority: Medium     BRUCE (obstructive sleep apnea) 04/19/2013     Priority: Medium     Mild without significant desaturations.  Currently not using CPAP.       CARDIOVASCULAR SCREENING; LDL GOAL LESS THAN 130 10/31/2010     Priority: Medium     Vitiligo 07/19/2010     Priority: Medium     Health Care Home 12/17/2012     Priority: Low     Susan Larsen RN-PHN  FPA / [facility]   [phone]    DX V65.8 REPLACED WITH [id number] HEALTH CARE HOME (04/08/2013)       Advanced directives, counseling/discussion 10/17/2011     Priority: Low     Patient has completed an Advance/Health Care Directive (HCD), to bring in copy to be scanned into  Epic[employer]en    Sigrid Glover  October 17, 2011          Past Medical History:   Diagnosis Date     Benign essential hypertension      HTN, goal below 140/90      Hyperlipidemia      Hypothyroidism      Hypothyroidism, unspecified type      Right knee DJD      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     Past Surgical History:   Procedure Laterality Date     ARTHROPLASTY KNEE Right 10/30/2014    Procedure: ARTHROPLASTY KNEE;  Surgeon: Fabian Deras MD;  Location: [facility] OR     ARTHROSCOPY KNEE Right 8/20/2015    Procedure: ARTHROSCOPY KNEE;  Surgeon: Fabian Deras MD;  Location: [facility] OR     BREAST LUMPECTOMY, RT/LT  1986    LT non-ca     C/SECTION, CLASSICAL  1973     COLONOSCOPY  1/2007    repeat in 10 years     IR MISCELLANEOUS PROCEDURE  7/27/2009     LAPAROSCOPIC HERNIORRHAPHY VENTRAL  11/21/2011    Procedure:LAPAROSCOPIC HERNIORRHAPHY VENTRAL; Laparoscopic Left Spigelian Herniorraphy With Mesh & Repair of Left Lower Quadrant Hernia With  Mesh; Surgeon:TODD PHAN; Location:[facility] OR     SURGICAL HISTORY OF -   7/27/2009    Left Heart Cath, Left Ventriculography, Coronary Angiogram, Closure Devi,  [facility]     TUBAL LIGATION       ZZHC REVISION GASTROPLASTY,OBESITY, NON-OBDULIA RESTRICT DEVICE  1979     Current Outpatient Medications   Medication Sig Dispense Refill     buPROPion (WELLBUTRIN XL) 150 MG 24 hr tablet TAKE 1 TABLET EVERY MORNING 90 tablet 3     CALTRATE 600+D 600-400 MG-UNIT PO TABS Take one tablet by mouth every day       celecoxib (CELEBREX) 200 MG capsule TAKE 1 CAPSULE DAILY (DISCONTINUE IBUPROFEN) 90 capsule 3     CENTRUM SILVER PO TABS Take one tablet by mouth every day  3     hydrochlorothiazide (HYDRODIURIL) 12.5 MG tablet Take 1 tablet (12.5 mg) by mouth daily 90 tablet 3     levothyroxine (SYNTHROID/LEVOTHROID) 125 MCG tablet TAKE 1 TABLET DAILY (DISCONTINUE LEVOTHYROXINE 137 MCG) 90 tablet 3     metoprolol tartrate (LOPRESSOR) 50 MG tablet Take 1 tablet (50 mg) by mouth daily as needed (Take  "one tablet the night before and one the morning of the CT heart scan) 2 tablet 0     topiramate (TOPAMAX) 50 MG tablet TAKE 1 TABLET TWICE A  tablet 3     VITAMIN D3 1000 UNIT PO CAPS 5000mg tablet,  1tab daily         Allergies   Allergen Reactions     Codeine Itching        Social History     Tobacco Use     Smoking status: Never Smoker     Smokeless tobacco: Never Used   Substance Use Topics     Alcohol use: Not Currently     Family History   Problem Relation Age of Onset     Respiratory Mother         emphysema age 78     Heart Disease Father         MI   ATGE 76     Thyroid Disease Brother      History   Drug Use No         Objective     /70 (BP Location: Right arm, Patient Position: Chair, Cuff Size: Adult Regular)   Pulse 73   Temp 98.8  F (37.1  C) (Tympanic)   Resp 12   Ht 1.626 m (5' 4\")   Wt 88 kg (194 lb)   LMP  (LMP Unknown)   SpO2 97%   BMI 33.30 kg/m      Physical Exam  GENERAL APPEARANCE: healthy, alert and no distress  HENT: ear canals and TM's normal and nose and mouth without ulcers or lesions  RESP: lungs clear to auscultation - no rales, rhonchi or wheezes  CV: regular rate and rhythm, normal S1 S2, no S3 or S4 and no murmur, click or rub   ABDOMEN: soft, nontender, no HSM or masses and bowel sounds normal  NEURO: Normal strength and tone, sensory exam grossly normal, mentation intact and speech normal    Recent Labs   Lab Test 11/09/21  1022 10/06/21  1546 01/28/21  0958   HGB  --   --  11.7   NA  --  138 136   POTASSIUM  --  4.2 4.2   CR 1.1* 0.96 0.91        Diagnostics:  Labs pending at this time.  Results will be reviewed when available.     No EKG this visit, completed in the last 90 days.     See above for cardiac history in the HPI.     Revised Cardiac Risk Index (RCRI):  The patient has the following serious cardiovascular risks for perioperative complications:   - Coronary Artery Disease (MI, positive stress test, angina, Qs on EKG) = 1 point     RCRI " Interpretation: 1 point: Class II (low risk - 0.9% complication rate)           Signed Electronically by: Tomasa Rios MD  Copy of this evaluation report is provided to requesting physician.

## 2021-12-28 ENCOUNTER — OFFICE VISIT (OUTPATIENT)
Dept: PLASTIC SURGERY | Facility: CLINIC | Age: 76
End: 2021-12-28
Attending: PLASTIC SURGERY
Payer: COMMERCIAL

## 2021-12-28 VITALS
HEART RATE: 61 BPM | TEMPERATURE: 98 F | OXYGEN SATURATION: 99 % | WEIGHT: 197 LBS | SYSTOLIC BLOOD PRESSURE: 174 MMHG | DIASTOLIC BLOOD PRESSURE: 78 MMHG | HEIGHT: 64 IN | BODY MASS INDEX: 33.63 KG/M2 | RESPIRATION RATE: 14 BRPM

## 2021-12-28 DIAGNOSIS — N62 HYPERTROPHY OF BREAST: Primary | ICD-10-CM

## 2021-12-28 PROCEDURE — 99213 OFFICE O/P EST LOW 20 MIN: CPT | Performed by: PLASTIC SURGERY

## 2021-12-28 PROCEDURE — G0463 HOSPITAL OUTPT CLINIC VISIT: HCPCS

## 2021-12-28 ASSESSMENT — PAIN SCALES - GENERAL: PAINLEVEL: MILD PAIN (3)

## 2021-12-28 ASSESSMENT — MIFFLIN-ST. JEOR: SCORE: 1368.84

## 2021-12-28 NOTE — NURSING NOTE
"Oncology Rooming Note    December 28, 2021 11:25 AM   Marilee Marks is a 76 year old female who presents for:    Chief Complaint   Patient presents with     Oncology Clinic Visit     HYPERTROPHY OF BREAST; PRE - OP CONSULT     Initial Vitals: BP (!) 174/78 (BP Location: Right arm, Patient Position: Sitting, Cuff Size: Adult Large)   Pulse 61   Temp 98  F (36.7  C) (Oral)   Resp 14   Ht 1.626 m (5' 4.02\")   Wt 89.4 kg (197 lb)   LMP  (LMP Unknown)   SpO2 99%   BMI 33.80 kg/m   Estimated body mass index is 33.8 kg/m  as calculated from the following:    Height as of this encounter: 1.626 m (5' 4.02\").    Weight as of this encounter: 89.4 kg (197 lb). Body surface area is 2.01 meters squared.  Mild Pain (3) Comment: Data Unavailable   No LMP recorded (lmp unknown). Patient is postmenopausal.  Allergies reviewed: Yes  Medications reviewed: Yes    Medications: Medication refills not needed today.  Pharmacy name entered into EPIC:    [facility]  [facility] - [place], [place] - [address]  [facility] - [address]  [facility] - [address]    Clinical concerns: Surgical options.        Renée Raphael CMA            "

## 2021-12-28 NOTE — LETTER
12/28/2021         RE: Marilee Marks  [address]  [place] [address]        Dear Colleague,    Thank you for referring your patient, Marilee Marks, to the [facility]. Please see a copy of my visit note below.    PRESENTING COMPLAINT:  Preoperative visit for upcoming bilateral breast reduction on 01/06/2022.    HISTORY OF PRESENTING COMPLAINT:  Ms. Marks is 76 years old.  She is here for a preoperative visit for upcoming bilateral breast reduction.  No change in her history and physical exam.  She is getting cleared from a medical standpoint.  She is a DDD and wants to be around a C cup.  Schnur scale is 620 grams.  Her last mammogram was in 10/2021, read as BI-RADS 1 negative.    ASSESSMENT AND PLAN:  Based on the above findings, a diagnosis of symptomatic bilateral breast hypertrophy, here for preoperative visit for upcoming bilateral breast reduction was made.  I went over the planned procedure with the patient in detail.  All discussion from beginning to end, as well as examination, was done in presence of my nurse, Janet Guzman.  I went over the planned surgery with her in detail.  I was very clear about expectation management.  I was very clear about making sure she understood she will have scars that are permanent, that she will be asymmetric, that I cannot guarantee cup sizes, that she may lose sensation of her nipple, that she may lose the nipple viability.  All risks, benefits, and alternatives including pain, infection, bleeding, scarring, asymmetry, seromas, hematomas, wound breakdown, wound dehiscence, skin necrosis, fat necrosis, nipple necrosis, T junction site necrosis, requirement of free nipple graft, DVT, PE, MI, CVA, pneumonia, renal failure and death were all explained.  She understood them all and wants to proceed.  I look forward to helping her out in the near future.  She was happy with the visit.    Total time spent with  chart review, visit itself and post-visit paperwork was 20 minutes.      FELIX Madrigal MD

## 2021-12-28 NOTE — NURSING NOTE
Pre Op Teaching Flowsheet       Pre and Post op Patient Education  Relevant Diagnosis:  Breast hypertrophy  Surgical procedure:  valencia reduction  Teaching Topic:  Pre and post op teaching  Person Involved in teaching: Yes    Motivation Level:  Asks Questions: Yes  Eager to Learn: Yes  Cooperative: Yes  Receptive (willing/able to accept information):  Yes    Patient demonstrates understanding of the following:  Date of surgery:  1/6/22  Location of surgery:  [facility] - 5th Floor  History and Physical and any other testing necessary prior to surgery: Yes  Required time line for completion of History and Physical and any pre-op testing: Yes    Patient demonstrates understanding of the following:  Pre-op bowel prep:  N/A  Pre-op showering/scrub information with PCMX Soap: Yes  Blood thinner medications discussed and when to stop (if applicable):  Defer to PCP at pre-op    Infection Prevention:   Patient demonstrates understanding of the following:  Surgical procedure site care taught: Yes  Signs and symptoms of infection taught: Yes      Post-op follow-up:  Discussed how to contact the hospital, nurse, and clinic scheduling staff if necessary. (See packet information)

## 2021-12-28 NOTE — PROGRESS NOTES
PRESENTING COMPLAINT:  Preoperative visit for upcoming bilateral breast reduction on 01/06/2022.    HISTORY OF PRESENTING COMPLAINT:  Ms. Marks is 76 years old.  She is here for a preoperative visit for upcoming bilateral breast reduction.  No change in her history and physical exam.  She is getting cleared from a medical standpoint.  She is a DDD and wants to be around a C cup.  Schnur scale is 620 grams.  Her last mammogram was in 10/2021, read as BI-RADS 1 negative.    ASSESSMENT AND PLAN:  Based on the above findings, a diagnosis of symptomatic bilateral breast hypertrophy, here for preoperative visit for upcoming bilateral breast reduction was made.  I went over the planned procedure with the patient in detail.  All discussion from beginning to end, as well as examination, was done in presence of my nurse, Janet Guzman.  I went over the planned surgery with her in detail.  I was very clear about expectation management.  I was very clear about making sure she understood she will have scars that are permanent, that she will be asymmetric, that I cannot guarantee cup sizes, that she may lose sensation of her nipple, that she may lose the nipple viability.  All risks, benefits, and alternatives including pain, infection, bleeding, scarring, asymmetry, seromas, hematomas, wound breakdown, wound dehiscence, skin necrosis, fat necrosis, nipple necrosis, T junction site necrosis, requirement of free nipple graft, DVT, PE, MI, CVA, pneumonia, renal failure and death were all explained.  She understood them all and wants to proceed.  I look forward to helping her out in the near future.  She was happy with the visit.    Total time spent with chart review, visit itself and post-visit paperwork was 20 minutes.

## 2022-01-02 ENCOUNTER — HEALTH MAINTENANCE LETTER (OUTPATIENT)
Age: 77
End: 2022-01-02

## 2022-01-03 ENCOUNTER — LAB (OUTPATIENT)
Dept: LAB | Facility: CLINIC | Age: 77
End: 2022-01-03
Attending: PLASTIC SURGERY

## 2022-01-03 DIAGNOSIS — Z11.59 ENCOUNTER FOR SCREENING FOR OTHER VIRAL DISEASES: ICD-10-CM

## 2022-01-03 PROCEDURE — U0005 INFEC AGEN DETEC AMPLI PROBE: HCPCS

## 2022-01-03 PROCEDURE — U0003 INFECTIOUS AGENT DETECTION BY NUCLEIC ACID (DNA OR RNA); SEVERE ACUTE RESPIRATORY SYNDROME CORONAVIRUS 2 (SARS-COV-2) (CORONAVIRUS DISEASE [COVID-19]), AMPLIFIED PROBE TECHNIQUE, MAKING USE OF HIGH THROUGHPUT TECHNOLOGIES AS DESCRIBED BY CMS-2020-01-R: HCPCS

## 2022-01-04 LAB — SARS-COV-2 RNA RESP QL NAA+PROBE: NEGATIVE

## 2022-01-07 DIAGNOSIS — N62 HYPERTROPHY OF BREAST: Primary | ICD-10-CM

## 2022-01-07 NOTE — PROGRESS NOTES
Hello,  This patient has a lab appointment for a covid swab. There's no orders for her. Can you please place an order for her appointment.  Thank you,  Lab Staff

## 2022-01-10 ENCOUNTER — LAB (OUTPATIENT)
Dept: LAB | Facility: CLINIC | Age: 77
End: 2022-01-10
Payer: COMMERCIAL

## 2022-01-10 DIAGNOSIS — N62 HYPERTROPHY OF BREAST: ICD-10-CM

## 2022-01-10 PROCEDURE — U0003 INFECTIOUS AGENT DETECTION BY NUCLEIC ACID (DNA OR RNA); SEVERE ACUTE RESPIRATORY SYNDROME CORONAVIRUS 2 (SARS-COV-2) (CORONAVIRUS DISEASE [COVID-19]), AMPLIFIED PROBE TECHNIQUE, MAKING USE OF HIGH THROUGHPUT TECHNOLOGIES AS DESCRIBED BY CMS-2020-01-R: HCPCS

## 2022-01-10 PROCEDURE — U0005 INFEC AGEN DETEC AMPLI PROBE: HCPCS

## 2022-01-11 ENCOUNTER — ANESTHESIA EVENT (OUTPATIENT)
Dept: SURGERY | Facility: AMBULATORY SURGERY CENTER | Age: 77
End: 2022-01-11
Payer: COMMERCIAL

## 2022-01-11 LAB — SARS-COV-2 RNA RESP QL NAA+PROBE: NEGATIVE

## 2022-01-11 RX ORDER — NALOXONE HYDROCHLORIDE 0.4 MG/ML
0.4 INJECTION, SOLUTION INTRAMUSCULAR; INTRAVENOUS; SUBCUTANEOUS
Status: DISCONTINUED | OUTPATIENT
Start: 2022-01-11 | End: 2022-01-13 | Stop reason: HOSPADM

## 2022-01-11 RX ORDER — NALOXONE HYDROCHLORIDE 0.4 MG/ML
0.2 INJECTION, SOLUTION INTRAMUSCULAR; INTRAVENOUS; SUBCUTANEOUS
Status: DISCONTINUED | OUTPATIENT
Start: 2022-01-11 | End: 2022-01-13 | Stop reason: HOSPADM

## 2022-01-12 ENCOUNTER — ANESTHESIA (OUTPATIENT)
Dept: SURGERY | Facility: AMBULATORY SURGERY CENTER | Age: 77
End: 2022-01-12
Payer: COMMERCIAL

## 2022-01-12 ENCOUNTER — HOSPITAL ENCOUNTER (OUTPATIENT)
Facility: AMBULATORY SURGERY CENTER | Age: 77
End: 2022-01-12
Attending: PLASTIC SURGERY
Payer: COMMERCIAL

## 2022-01-12 VITALS
WEIGHT: 189 LBS | HEART RATE: 67 BPM | BODY MASS INDEX: 32.27 KG/M2 | DIASTOLIC BLOOD PRESSURE: 68 MMHG | RESPIRATION RATE: 18 BRPM | TEMPERATURE: 97.8 F | OXYGEN SATURATION: 92 % | HEIGHT: 64 IN | SYSTOLIC BLOOD PRESSURE: 171 MMHG

## 2022-01-12 DIAGNOSIS — N62 HYPERTROPHY OF BREAST: Primary | ICD-10-CM

## 2022-01-12 PROCEDURE — 88305 TISSUE EXAM BY PATHOLOGIST: CPT | Mod: TC | Performed by: PLASTIC SURGERY

## 2022-01-12 PROCEDURE — 88305 TISSUE EXAM BY PATHOLOGIST: CPT | Mod: 26 | Performed by: PATHOLOGY

## 2022-01-12 PROCEDURE — 19318 BREAST REDUCTION: CPT | Mod: 50 | Performed by: PLASTIC SURGERY

## 2022-01-12 PROCEDURE — 19318 BREAST REDUCTION: CPT | Mod: 50

## 2022-01-12 RX ORDER — ONDANSETRON 2 MG/ML
INJECTION INTRAMUSCULAR; INTRAVENOUS PRN
Status: DISCONTINUED | OUTPATIENT
Start: 2022-01-12 | End: 2022-01-12

## 2022-01-12 RX ORDER — DIAZEPAM 10 MG/2ML
2.5 INJECTION, SOLUTION INTRAMUSCULAR; INTRAVENOUS
Status: DISCONTINUED | OUTPATIENT
Start: 2022-01-12 | End: 2022-01-12 | Stop reason: HOSPADM

## 2022-01-12 RX ORDER — PROPOFOL 10 MG/ML
INJECTION, EMULSION INTRAVENOUS CONTINUOUS PRN
Status: DISCONTINUED | OUTPATIENT
Start: 2022-01-12 | End: 2022-01-12

## 2022-01-12 RX ORDER — SODIUM CHLORIDE, SODIUM LACTATE, POTASSIUM CHLORIDE, CALCIUM CHLORIDE 600; 310; 30; 20 MG/100ML; MG/100ML; MG/100ML; MG/100ML
INJECTION, SOLUTION INTRAVENOUS CONTINUOUS
Status: DISCONTINUED | OUTPATIENT
Start: 2022-01-12 | End: 2022-01-13 | Stop reason: HOSPADM

## 2022-01-12 RX ORDER — FENTANYL CITRATE 50 UG/ML
25-50 INJECTION, SOLUTION INTRAMUSCULAR; INTRAVENOUS
Status: DISCONTINUED | OUTPATIENT
Start: 2022-01-12 | End: 2022-01-12 | Stop reason: HOSPADM

## 2022-01-12 RX ORDER — HYDROMORPHONE HYDROCHLORIDE 1 MG/ML
0.2 INJECTION, SOLUTION INTRAMUSCULAR; INTRAVENOUS; SUBCUTANEOUS EVERY 5 MIN PRN
Status: DISCONTINUED | OUTPATIENT
Start: 2022-01-12 | End: 2022-01-12 | Stop reason: HOSPADM

## 2022-01-12 RX ORDER — CEFAZOLIN SODIUM 2 G/50ML
2 SOLUTION INTRAVENOUS SEE ADMIN INSTRUCTIONS
Status: DISCONTINUED | OUTPATIENT
Start: 2022-01-12 | End: 2022-01-12 | Stop reason: HOSPADM

## 2022-01-12 RX ORDER — MEPERIDINE HYDROCHLORIDE 25 MG/ML
12.5 INJECTION INTRAMUSCULAR; INTRAVENOUS; SUBCUTANEOUS
Status: DISCONTINUED | OUTPATIENT
Start: 2022-01-12 | End: 2022-01-13 | Stop reason: HOSPADM

## 2022-01-12 RX ORDER — BUPIVACAINE HYDROCHLORIDE 2.5 MG/ML
INJECTION, SOLUTION EPIDURAL; INFILTRATION; INTRACAUDAL
Status: COMPLETED | OUTPATIENT
Start: 2022-01-12 | End: 2022-01-12

## 2022-01-12 RX ORDER — GLYCOPYRROLATE 0.2 MG/ML
INJECTION, SOLUTION INTRAMUSCULAR; INTRAVENOUS PRN
Status: DISCONTINUED | OUTPATIENT
Start: 2022-01-12 | End: 2022-01-12

## 2022-01-12 RX ORDER — FENTANYL CITRATE 50 UG/ML
25 INJECTION, SOLUTION INTRAMUSCULAR; INTRAVENOUS EVERY 5 MIN PRN
Status: DISCONTINUED | OUTPATIENT
Start: 2022-01-12 | End: 2022-01-12 | Stop reason: HOSPADM

## 2022-01-12 RX ORDER — ACETAMINOPHEN 325 MG/1
975 TABLET ORAL ONCE
Status: COMPLETED | OUTPATIENT
Start: 2022-01-12 | End: 2022-01-12

## 2022-01-12 RX ORDER — DEXAMETHASONE SODIUM PHOSPHATE 10 MG/ML
4 INJECTION, SOLUTION INTRAMUSCULAR; INTRAVENOUS EVERY 10 MIN PRN
Status: DISCONTINUED | OUTPATIENT
Start: 2022-01-12 | End: 2022-01-13 | Stop reason: HOSPADM

## 2022-01-12 RX ORDER — ONDANSETRON 4 MG/1
4 TABLET, ORALLY DISINTEGRATING ORAL EVERY 30 MIN PRN
Status: DISCONTINUED | OUTPATIENT
Start: 2022-01-12 | End: 2022-01-13 | Stop reason: HOSPADM

## 2022-01-12 RX ORDER — AMOXICILLIN 250 MG
1-2 CAPSULE ORAL 2 TIMES DAILY
Qty: 30 TABLET | Refills: 0 | Status: SHIPPED | OUTPATIENT
Start: 2022-01-12 | End: 2022-05-09

## 2022-01-12 RX ORDER — DEXAMETHASONE SODIUM PHOSPHATE 4 MG/ML
INJECTION, SOLUTION INTRA-ARTICULAR; INTRALESIONAL; INTRAMUSCULAR; INTRAVENOUS; SOFT TISSUE PRN
Status: DISCONTINUED | OUTPATIENT
Start: 2022-01-12 | End: 2022-01-12

## 2022-01-12 RX ORDER — FENTANYL CITRATE 50 UG/ML
25 INJECTION, SOLUTION INTRAMUSCULAR; INTRAVENOUS
Status: DISCONTINUED | OUTPATIENT
Start: 2022-01-12 | End: 2022-01-13 | Stop reason: HOSPADM

## 2022-01-12 RX ORDER — OXYCODONE HYDROCHLORIDE 5 MG/1
5-10 TABLET ORAL EVERY 6 HOURS PRN
Qty: 20 TABLET | Refills: 0 | Status: SHIPPED | OUTPATIENT
Start: 2022-01-12 | End: 2022-05-09

## 2022-01-12 RX ORDER — FLUMAZENIL 0.1 MG/ML
0.2 INJECTION, SOLUTION INTRAVENOUS
Status: DISCONTINUED | OUTPATIENT
Start: 2022-01-12 | End: 2022-01-12 | Stop reason: HOSPADM

## 2022-01-12 RX ORDER — ALBUTEROL SULFATE 0.83 MG/ML
2.5 SOLUTION RESPIRATORY (INHALATION) EVERY 4 HOURS PRN
Status: DISCONTINUED | OUTPATIENT
Start: 2022-01-12 | End: 2022-01-12 | Stop reason: HOSPADM

## 2022-01-12 RX ORDER — EPHEDRINE SULFATE 50 MG/ML
INJECTION, SOLUTION INTRAMUSCULAR; INTRAVENOUS; SUBCUTANEOUS PRN
Status: DISCONTINUED | OUTPATIENT
Start: 2022-01-12 | End: 2022-01-12

## 2022-01-12 RX ORDER — SODIUM CHLORIDE, SODIUM LACTATE, POTASSIUM CHLORIDE, CALCIUM CHLORIDE 600; 310; 30; 20 MG/100ML; MG/100ML; MG/100ML; MG/100ML
INJECTION, SOLUTION INTRAVENOUS CONTINUOUS
Status: DISCONTINUED | OUTPATIENT
Start: 2022-01-12 | End: 2022-01-12 | Stop reason: HOSPADM

## 2022-01-12 RX ORDER — LIDOCAINE HYDROCHLORIDE 20 MG/ML
INJECTION, SOLUTION INFILTRATION; PERINEURAL PRN
Status: DISCONTINUED | OUTPATIENT
Start: 2022-01-12 | End: 2022-01-12

## 2022-01-12 RX ORDER — PROPOFOL 10 MG/ML
INJECTION, EMULSION INTRAVENOUS PRN
Status: DISCONTINUED | OUTPATIENT
Start: 2022-01-12 | End: 2022-01-12

## 2022-01-12 RX ORDER — LIDOCAINE 40 MG/G
CREAM TOPICAL
Status: DISCONTINUED | OUTPATIENT
Start: 2022-01-12 | End: 2022-01-12 | Stop reason: HOSPADM

## 2022-01-12 RX ORDER — ONDANSETRON 2 MG/ML
4 INJECTION INTRAMUSCULAR; INTRAVENOUS EVERY 30 MIN PRN
Status: DISCONTINUED | OUTPATIENT
Start: 2022-01-12 | End: 2022-01-13 | Stop reason: HOSPADM

## 2022-01-12 RX ORDER — OXYCODONE HYDROCHLORIDE 5 MG/1
5 TABLET ORAL EVERY 4 HOURS PRN
Status: DISCONTINUED | OUTPATIENT
Start: 2022-01-12 | End: 2022-01-13 | Stop reason: HOSPADM

## 2022-01-12 RX ORDER — CEFAZOLIN SODIUM 2 G/50ML
2 SOLUTION INTRAVENOUS
Status: COMPLETED | OUTPATIENT
Start: 2022-01-12 | End: 2022-01-12

## 2022-01-12 RX ORDER — ONDANSETRON 4 MG/1
4-8 TABLET, ORALLY DISINTEGRATING ORAL EVERY 8 HOURS PRN
Qty: 4 TABLET | Refills: 0 | Status: SHIPPED | OUTPATIENT
Start: 2022-01-12 | End: 2022-05-09

## 2022-01-12 RX ORDER — LABETALOL HYDROCHLORIDE 5 MG/ML
10 INJECTION, SOLUTION INTRAVENOUS
Status: DISCONTINUED | OUTPATIENT
Start: 2022-01-12 | End: 2022-01-12 | Stop reason: HOSPADM

## 2022-01-12 RX ADMIN — FENTANYL CITRATE 25 MCG: 50 INJECTION, SOLUTION INTRAMUSCULAR; INTRAVENOUS at 12:38

## 2022-01-12 RX ADMIN — SODIUM CHLORIDE, SODIUM LACTATE, POTASSIUM CHLORIDE, CALCIUM CHLORIDE: 600; 310; 30; 20 INJECTION, SOLUTION INTRAVENOUS at 10:49

## 2022-01-12 RX ADMIN — PROPOFOL 150 MCG/KG/MIN: 10 INJECTION, EMULSION INTRAVENOUS at 12:38

## 2022-01-12 RX ADMIN — LIDOCAINE HYDROCHLORIDE 100 MG: 20 INJECTION, SOLUTION INFILTRATION; PERINEURAL at 12:38

## 2022-01-12 RX ADMIN — SODIUM CHLORIDE, SODIUM LACTATE, POTASSIUM CHLORIDE, CALCIUM CHLORIDE: 600; 310; 30; 20 INJECTION, SOLUTION INTRAVENOUS at 12:31

## 2022-01-12 RX ADMIN — FENTANYL CITRATE 25 MCG: 50 INJECTION, SOLUTION INTRAMUSCULAR; INTRAVENOUS at 12:54

## 2022-01-12 RX ADMIN — FENTANYL CITRATE 50 MCG: 50 INJECTION, SOLUTION INTRAMUSCULAR; INTRAVENOUS at 13:00

## 2022-01-12 RX ADMIN — Medication 0.5 MG: at 13:05

## 2022-01-12 RX ADMIN — PROPOFOL 150 MG: 10 INJECTION, EMULSION INTRAVENOUS at 12:38

## 2022-01-12 RX ADMIN — PROPOFOL 50 MG: 10 INJECTION, EMULSION INTRAVENOUS at 13:05

## 2022-01-12 RX ADMIN — BUPIVACAINE HYDROCHLORIDE 30 ML: 2.5 INJECTION, SOLUTION EPIDURAL; INFILTRATION; INTRACAUDAL at 11:43

## 2022-01-12 RX ADMIN — ACETAMINOPHEN 975 MG: 325 TABLET ORAL at 10:49

## 2022-01-12 RX ADMIN — GLYCOPYRROLATE 0.2 MG: 0.2 INJECTION, SOLUTION INTRAMUSCULAR; INTRAVENOUS at 12:46

## 2022-01-12 RX ADMIN — CEFAZOLIN SODIUM 2 G: 2 SOLUTION INTRAVENOUS at 12:40

## 2022-01-12 RX ADMIN — Medication 0.5 MG: at 13:34

## 2022-01-12 RX ADMIN — EPHEDRINE SULFATE 10 MG: 50 INJECTION, SOLUTION INTRAMUSCULAR; INTRAVENOUS; SUBCUTANEOUS at 12:42

## 2022-01-12 RX ADMIN — FENTANYL CITRATE 50 MCG: 50 INJECTION, SOLUTION INTRAMUSCULAR; INTRAVENOUS at 11:34

## 2022-01-12 RX ADMIN — ONDANSETRON 4 MG: 2 INJECTION INTRAMUSCULAR; INTRAVENOUS at 12:45

## 2022-01-12 RX ADMIN — DEXAMETHASONE SODIUM PHOSPHATE 4 MG: 4 INJECTION, SOLUTION INTRA-ARTICULAR; INTRALESIONAL; INTRAMUSCULAR; INTRAVENOUS; SOFT TISSUE at 12:45

## 2022-01-12 ASSESSMENT — MIFFLIN-ST. JEOR: SCORE: 1332.3

## 2022-01-12 NOTE — ANESTHESIA CARE TRANSFER NOTE
Patient: Marilee Marks    Procedure: Procedure(s):  MAMMOPLASTY, REDUCTION, BILATERAL       Diagnosis: Hypertrophy of breast [N62]  Diagnosis Additional Information: No value filed.    Anesthesia Type:   General     Note:    Oropharynx: oropharynx clear of all foreign objects  Level of Consciousness: awake  Oxygen Supplementation: face mask    Independent Airway: airway patency satisfactory and stable  Dentition: dentition unchanged  Vital Signs Stable: post-procedure vital signs reviewed and stable  Report to RN Given: handoff report given  Patient transferred to: PACU    Handoff Report: Identifed the Patient, Identified the Reponsible Provider, Reviewed the pertinent medical history, Discussed the surgical course, Reviewed Intra-OP anesthesia mangement and issues during anesthesia, Set expectations for post-procedure period and Allowed opportunity for questions and acknowledgement of understanding      Vitals:  Vitals Value Taken Time   BP     Temp     Pulse 93 01/12/22 1451   Resp 14 01/12/22 1451   SpO2 100 % 01/12/22 1451   Vitals shown include unvalidated device data.    Electronically Signed By: ALANIS Park CRNA  January 12, 2022  2:52 PM

## 2022-01-12 NOTE — ANESTHESIA PROCEDURE NOTES
Pectoralis Procedure Note    Pre-Procedure   Staff -        Anesthesiologist:  Mau Chavis MD       Performed By: anesthesiologist       Location: pre-op       Pre-Anesthestic Checklist: patient identified, IV checked, site marked, risks and benefits discussed, informed consent, monitors and equipment checked, pre-op evaluation, at physician/surgeon's request and post-op pain management  Timeout:       Correct Patient: Yes        Correct Procedure: Yes        Correct Site: Yes        Correct Position: Yes        Correct Laterality: Yes        Site Marked: Yes  Procedure Documentation  Procedure: Pectoralis       Diagnosis: POST OPERATIVE PAIN       Laterality: bilateral       Patient Position: supine       Patient Prep/Sterile Barriers: sterile gloves, mask       Skin prep: Chloraprep       Needle Type: short bevel       Needle Gauge: 21.        Needle Length (millimeters): 110        Ultrasound guided       1. Ultrasound was used to identify targeted nerve, plexus, vascular marker, or fascial plane and place a needle adjacent to it in real-time.       2. Ultrasound was used to visualize the spread of anesthetic in close proximity to the above referenced structure.       3. A permanent image is entered into the patient's record.    Assessment/Narrative         The placement was negative for: blood aspirated, painful injection and site bleeding       Paresthesias: No.     Bolus given via needle..        Secured via.        Insertion/Infusion Method: Single Shot       Complications: none       Injection made incrementally with aspirations every 5 mL.    Medication(s) Administered   Bupivacaine 0.25% PF (Infiltration), 30 mL  Bupivacaine liposome (Exparel) 1.3% LA inj susp (Infiltration), 20 mL  Medication Administration Time: 1/12/2022 11:43 AM     Comments:  266mg exparel used

## 2022-01-12 NOTE — OR NURSING
Bilateral PECS block administered with exparel. Pt. Ramirez. Well. VSS throughout. 50 mcg fent administered.

## 2022-01-12 NOTE — OP NOTE
PREOPERATIVE DIAGNOSIS: Symptomatic bilateral breast hypertrophy.     POSTOPERATIVE DIAGNOSIS: Symptomatic bilateral breast hypertrophy.     PROCEDURES: Bilateral superomedial pedicle inverted T skin closure breast reduction.     SURGEON: Yonas Madrigal MD.     RESIDENT: Aly Carter MD.    ANESTHESIA: General anesthesia with endotracheal intubation.     COMPLICATIONS: Nil.     DRAINS: Nil.     Blood Loss: 150 mL    SPECIMENS: Skin and breast tissue from right breast measuring about 914 g, left breast measuring about 653 g.     DESCRIPTION OF PROCEDURE: After informed consent was taken, the proper site and procedure was ascertained with the patient and was appropriately marked and taken to in the operating room.  She was placed in supine position with the knees comfortably flexed with pillows underneath them, and pneumoboots placed and running prior to induction of anesthesia. Preoperative antibiotics given in the OR. All pressure points were appropriately padded. General anesthesia was administered without any complications. She was placed in such a position that she could be flexed to about 50 degrees. Her arms were padded and abducted to about 50 degrees. She was prepped and draped in a standard surgical fashion. I began by first remarking the preop markings and marking a 42 mm areola on each side. I then marked out a superior medial pedicle on each side. I then de-epithelialized the pedicle on each side. I then dissected out the pedicle on each side without actually seeing the deep fascia and also released the pedicle such that it could rotate into its new nipple position without any tension. I then went ahead and on each side excised the inferomedial, inferior, inferolateral and lateral aspects of the breast according to a vertical pattern reduction. Strict hemostasis was ensured during this entire part of the case. Once this was done, I then temporarily closed the patient's breast in an inverted T fashion, sat the  patient up ensured symmetry and then marked out the new areolar opening symmetrically on each side. I then went ahead and closed the horizontal incision using 2-0 Monocryl suture in a deep dermal layer. Then I made sure that the nipple areolar complex could be retrieved without any tension, which is could on each side. I then checked that they were both pink and viable, which they were. I then went ahead and excised the skin of the new areolar opening and de-epithelialized epithelialized the portion that was involved in the pedicle on each side. I then sutured in the nipple areolar complex using 2-0 Monocryl suture in a deep dermal fashion circumferentially. I then closed the vertical incision using 2-0 Monocryl suture to approximate the medial and lateral pillars, and then the deep dermis. I then ran all the incisions with 3-0 Strattafix suture in a running intracuticular manner followed by placement of Prineo, and then followed by an ACE wrap. At the end of the case, the patient's breasts were soft, nipples were pink, and breasts were symmetric. The patient tolerated the procedure well. All counts correct at the end of the case. The patient was extubated and sent to recovery room in a stable condition.

## 2022-01-12 NOTE — ANESTHESIA PREPROCEDURE EVALUATION
Anesthesia Pre-Procedure Evaluation    Patient: Marilee Marks   MRN: [id number] : 1945        Preoperative Diagnosis: Hypertrophy of breast [N62]    Procedure : Procedure(s):  MAMMOPLASTY, REDUCTION, BILATERAL          Past Medical History:   Diagnosis Date     Benign essential hypertension      HTN, goal below 140/90      Hyperlipidemia      Hypothyroidism      Hypothyroidism, unspecified type      LBBB (left bundle branch block) 2021     Right knee DJD      Spigelian hernia 2011      Past Surgical History:   Procedure Laterality Date     ARTHROPLASTY KNEE Right 10/30/2014    Procedure: ARTHROPLASTY KNEE;  Surgeon: Fabian Deras MD;  Location: [facility] OR     ARTHROSCOPY KNEE Right 2015    Procedure: ARTHROSCOPY KNEE;  Surgeon: Fabian Deras MD;  Location: [facility] OR     BREAST LUMPECTOMY, RT/LT      LT non-ca     C/SECTION, CLASSICAL  1973     COLONOSCOPY  2007    repeat in 10 years     IR MISCELLANEOUS PROCEDURE  2009     LAPAROSCOPIC HERNIORRHAPHY VENTRAL  2011    Procedure:LAPAROSCOPIC HERNIORRHAPHY VENTRAL; Laparoscopic Left Spigelian Herniorraphy With Mesh & Repair of Left Lower Quadrant Hernia With  Mesh; Surgeon:TODD PHAN; Location:[facility] OR     SURGICAL HISTORY OF -   2009    Left Heart Cath, Left Ventriculography, Coronary Angiogram, Closure [place], [facility]     TUBAL LIGATION       ZZHC REVISION GASTROPLASTY,OBESITY, NON-OBDULIA RESTRICT DEVICE  1979      Allergies   Allergen Reactions     Codeine Itching      Social History     Tobacco Use     Smoking status: Never Smoker     Smokeless tobacco: Never Used   Substance Use Topics     Alcohol use: Not Currently      Wt Readings from Last 1 Encounters:   21 89.4 kg (197 lb)        Anesthesia Evaluation            ROS/MED HX  ENT/Pulmonary:     (+) sleep apnea,     Neurologic:       Cardiovascular:     (+) hypertension-----    METS/Exercise Tolerance:  Comment: LBBB   Hematologic:     (+)  History of blood clots,     Musculoskeletal:       GI/Hepatic:       Renal/Genitourinary:       Endo:     (+) Obesity,     Psychiatric/Substance Use:     (+) psychiatric history depression     Infectious Disease:       Malignancy:       Other:            Physical Exam    Airway        Mallampati: II       Respiratory Devices and Support         Dental           Cardiovascular          Rhythm and rate: regular and normal     Pulmonary           breath sounds clear to auscultation           OUTSIDE LABS:  CBC:   Lab Results   Component Value Date    WBC 4.8 11/26/2019    WBC 4.0 11/08/2019    HGB 11.7 01/28/2021    HGB 12.5 11/26/2019    HCT 40.0 11/26/2019    HCT 38.6 11/08/2019     11/26/2019     11/08/2019     BMP:   Lab Results   Component Value Date     12/23/2021     10/06/2021    POTASSIUM 4.4 12/23/2021    POTASSIUM 4.2 10/06/2021    CHLORIDE 113 (H) 12/23/2021    CHLORIDE 108 10/06/2021    CO2 26 12/23/2021    CO2 25 10/06/2021    BUN 27 12/23/2021    BUN 21 10/06/2021    CR 1.34 (H) 12/23/2021    CR 1.1 (H) 11/09/2021     (H) 12/23/2021     (H) 10/06/2021     COAGS: No results found for: PTT, INR, FIBR  POC: No results found for: BGM, HCG, HCGS  HEPATIC:   Lab Results   Component Value Date    ALBUMIN 3.7 10/04/2019    PROTTOTAL 6.7 (L) 10/04/2019    ALT 32 11/08/2019    AST 17 11/08/2019    ALKPHOS 108 10/04/2019    BILITOTAL 0.6 10/04/2019     OTHER:   Lab Results   Component Value Date    OPAL 10.0 12/23/2021    PHOS 3.0 06/29/2015    MAG 2.3 10/04/2019    TSH 3.01 01/28/2021    T4 1.11 09/23/2020    CRP <2.9 09/23/2020    SED 17 06/29/2015       Anesthesia Plan    ASA Status:  3      Anesthesia Type: General.     - Airway: LMA   Induction: Intravenous.   Maintenance: TIVA.        Consents    Anesthesia Plan(s) and associated risks, benefits, and realistic alternatives discussed. Questions answered and patient/representative(s) expressed understanding.    -  Discussed:     - Discussed with:  Patient         Postoperative Care    Pain management: Oral pain medications, IV analgesics, Multi-modal analgesia, Peripheral nerve block (Single Shot).   PONV prophylaxis: Ondansetron (or other 5HT-3), Dexamethasone or Solumedrol     Comments:                Mau Chavis MD

## 2022-01-12 NOTE — DISCHARGE INSTRUCTIONS
"[facility]  Home Care Following Anesthesia  For 24 hours after surgery:  1. Get plenty of rest.  A responsible adult must stay with you for at least 24 hours after you leave the surgery center.  2. Do not drive or use heavy equipment.  If you have weakness or tingling, don't drive or use heavy equipment until this feeling goes away.   3. Do not drink alcohol.   4. Avoid strenuous or risky activities.  Ask for help when climbing stairs.  5. You may feel lightheaded.  IF so, sit for a few minutes before standing.  Have someone help you get up.   6. If you have nausea (feel sick to your stomach): Drink only clear liquids such as apple juice, ginger ale, broth or 7-Up.  Rest may also help.  Be sure to drink enough fluids.  Move to a regular diet as you feel able.   7. You may have a slight fever.  Call the doctor if your fever is over 100 F (37.7 C) (taken under the tongue) or lasts longer than 24 hours.  8. You may have a dry mouth, a sore throat, muscle aches or trouble sleeping. These should go away after 24 hours.  9. Do not make important or legal decisions.   10. It is recommended to avoid smoking.        Today you received an Exparel block to numb the nerves near your surgery site.  This is a block using local anesthetic or \"numbing\" medication injected around the nerves to anesthetize or \"numb\" the area supplied by those nerves.  This block is injected into the muscle layer near your surgical site.  This medication may numb the location where you had surgery up to 72 hours.  If your surgical site is an arm or leg you should be careful with your affected limb, since it is possible to injure your limb without being aware of it due to the numbing.  Until full feeling returns, you should guard against bumping or hitting your limb, and avoid extreme hot or cold temperatures on the skin.  As the block wears off, the feeling will return as a tingling or prickly sensation near your " surgical site.  You will experince more discomfort from your incision as the feeling returns.  You may want to take a pain pill (a narcotic or Tylenol if this was prescribed by your surgeon) when you start to experience mild pain before the pain beomes more severe.  If your pain medications do not control your pain, you should notify your surgeon.    Tips for taking pain medications  To get the best pain relief possible, remember these points:    Take pain medications as directed, before pain becomes severe.    Pain medication can upset your stomach: taking it with food may help.    Constipation is a common side effect of pain medication. Drink plenty of  fluids.    Eat foods high in fiber. Take a stool softener if recommended by your doctor or pharmacist.    Do not drink alcohol, drive or operate machinery while taking pain medications.    Ask about other ways to control pain, such as with heat, ice or relaxation.    Tylenol/Acetaminophen Consumption  To help encourage the safe use of acetaminophen, the makers of TYLENOL  have lowered the maximum daily dose for single-ingredient Extra Strength TYLENOL  (acetaminophen) products sold in the [place] from 8 pills per day (4,000 mg) to 6 pills per day (3,000 mg). The dosing interval has also changed from 2 pills every 4-6 hours to 2 pills every 6 hours.    If you feel your pain relief is insufficient, you may take Tylenol/Acetaminophen in addition to your narcotic pain medication.     Be careful not to exceed 3,000 mg of Tylenol/Acetaminophen in a 24 hour period from all sources.    If you are taking extra strength Tylenol/acetaminophen (500 mg), the maximum dose is 6 tablets in 24 hours.    If you are taking regular strength acetaminophen (325 mg), the maximum dose is 9 tablets in 24 hours.    Call a doctor for any of the followin. Signs of infection (fever, growing tenderness at the surgery site, a large amount of drainage or bleeding, severe pain, foul-smelling  drainage, redness, swelling).  2. It has been over 8 to 10 hours since surgery and you are still not able to urinate (pass water).  3. Headache for over 24 hours.  4. Numbness, tingling or weakness the day after surgery (if you had spinal anesthesia).  5. Signs of Covid-19 infection (temperature over 100 degrees, shortness of breath, cough, loss of taste/smell, generalized body aches, persistent headache, chills, sore throat, nausea/vomiting/diarrhea)  Your doctor is:  Dr. Yonas Madrigal, Plastic Surgery: [phone]                    Or dial [phone] and ask for the resident on call for:  Plastics  For emergency care, call the:  [facility]:  [phone] (TTY for hearing impaired: [phone])    BREAST REDUCTION POST-OPERATIVE INSTRUCTIONS    Instructions       Have someone drive you home after surgery and help you at home for 1-2      days.      Get plenty of rest.      Follow balanced diet.      Decreased activity may promote constipation, so you may want to add      more raw fruit to your diet, and be sure to increase fluid intake. Your doctor       may also order a stool softener.      Do not drink alcohol when taking pain medications.      If you are taking vitamins with iron, resume these as tolerated.      Do not smoke, as smoking delays healing and increases the risk of      Complications.    Medications       Please take stool softeners while taking narcotic medications to prevent constipation        You may take tylenol or ibuprofen (eg other NSAIDS) after surgery instead of or in addition to the prescribed narcotic pain medications.     Activities      Do not drive while taking narcotic pain medications and while experiencing any pain.       Do not drive until you have full range of motion with your arms and can stop the car       or swerve in an emergency.      Start walking the evening of surgery, this helps to reduce swelling and       lowers the chance of blood clots.       Refrain from vigorous activities for 2-6 weeks.  Increase activity gradually as tolerated.      After 2 weeks, you may perform lower body exercise, but must wait the full 6 weeks       prior to performing upper body exercise      Avoid lifting anything over 5 pounds for 2 weeks.      Resume social and employment activities in about 2 weeks (if not too strenuous).    Incision Care      You may shower the next day after surgery. The ACE wrap (if used) may be rewrapped as needed (if too tight or loose). Use it for support and may subtitute with a sports bra if preferred.       Avoid exposing scars to sun for at least 12 months.      Always use a strong sunblock, if sun exposure is unavoidable (SPF 50 or      greater).      Keep the tape on incisions until it starts to curl up on the ends, and then gently remove them.        You may use moisturizing cream (eg Aquaphor or Vaseline) at 10 days to help the tape come off. By two weeks,      you may pull off the tape off the incisions if still in place.      Wear your surgical bra/wrap 24/7 as directed for 4 weeks.      Avoid bras with stays and underwires for 4-6 weeks.      You may pad the incisions with gauze for comfort (panty liners also work well as they        are absorbent and inexpensive).      Inspect daily for signs of infection.      No tub soaking, bathing, or swimming until wounds are healed.      If your breast skin is dry after surgery, you can apply a moisturizer several times a       day.     What to Expect      Despite layered closing of your incisions, there will be some oozing       of tissue fluid from incision sites. This is expected, especially for the first 2 days or so.  This will soak up on the gauze and the bra       to look like it is more than it really is. If the draining continues past 2 days, with pain, discomfort, and large volumes, please call the clinic.       Most of the higher discomfort will subside after the first few days.      You  "may experience temporary soreness, bruising, swelling and tightness in the       breasts as well as discomfort in the incision area.      You may have have normal sensation in the nipples. This may be more or less than usual, and usually returns over a couple of months.      Your first menstruation following surgery may cause your breasts to swell and hurt.      You may have random shooting pains, tingling, or other strange sensations in the skin for a few months. These will subside.    Appearance      Most of the discoloration and swelling will subside in 2-4 weeks.      Your breasts will feel firm to the touch initially, but will soften with time.      A more natural shape will occur as the breasts \"settle\" in a slightly lower position over the first few months.      Scars may be red and thick for 6-12 months (longer in lighter-skinned patients).  In time, these usually soften and fade.    Follow-Up Care      Typically, you will have a post op check at 1-2 weeks, and again with your surgeon in another month.    When to Call      If you have increased swelling or bruising, particular one side greater than the other.      If swelling and redness persist after a few days.      If you have increased redness along the incision.      If you have severe or increased pain not relieved by medication.      If you have any side effects to medications; such as, rash, nausea,      headache, vomiting, or constipation.      If you have an oral temperature over 100.4 degrees.      If you have any yellowish or greenish drainage from the incisions or      notice a foul odor.      If you have bleeding from the incisions that is difficult to control with      light pressure.      If you have loss of feeling or motion.      Any unanswered concern.    For Medical Questions, Please Call:      [phone], Monday - Friday, 8 a.m. - 4:30 p.m.      After hours and on weekends, call Hospital Paging at [phone] and      ask for the " Plastic Surgeon on call.

## 2022-01-17 LAB
PATH REPORT.COMMENTS IMP SPEC: NORMAL
PATH REPORT.COMMENTS IMP SPEC: NORMAL
PATH REPORT.FINAL DX SPEC: NORMAL
PATH REPORT.GROSS SPEC: NORMAL
PATH REPORT.MICROSCOPIC SPEC OTHER STN: NORMAL
PATH REPORT.RELEVANT HX SPEC: NORMAL
PHOTO IMAGE: NORMAL

## 2022-01-18 ENCOUNTER — OFFICE VISIT (OUTPATIENT)
Dept: PLASTIC SURGERY | Facility: CLINIC | Age: 77
End: 2022-01-18
Attending: PLASTIC SURGERY
Payer: COMMERCIAL

## 2022-01-18 VITALS
TEMPERATURE: 98.2 F | DIASTOLIC BLOOD PRESSURE: 75 MMHG | OXYGEN SATURATION: 98 % | WEIGHT: 192 LBS | HEART RATE: 67 BPM | BODY MASS INDEX: 32.96 KG/M2 | SYSTOLIC BLOOD PRESSURE: 158 MMHG

## 2022-01-18 DIAGNOSIS — N62 HYPERTROPHY OF BREAST: Primary | ICD-10-CM

## 2022-01-18 PROCEDURE — G0463 HOSPITAL OUTPT CLINIC VISIT: HCPCS

## 2022-01-18 PROCEDURE — 99024 POSTOP FOLLOW-UP VISIT: CPT | Performed by: PLASTIC SURGERY

## 2022-01-18 ASSESSMENT — PAIN SCALES - GENERAL: PAINLEVEL: NO PAIN (0)

## 2022-01-18 NOTE — NURSING NOTE
"Oncology Rooming Note    January 18, 2022 10:51 AM   Marilee Marks is a 76 year old female who presents for:    Chief Complaint   Patient presents with     Oncology Clinic Visit     hypertrophy of breast; post-op     Initial Vitals: BP (!) 158/75   Pulse 67   Temp 98.2  F (36.8  C) (Oral)   Wt 87.1 kg (192 lb)   LMP  (LMP Unknown)   SpO2 98%   BMI 32.96 kg/m   Estimated body mass index is 32.96 kg/m  as calculated from the following:    Height as of 1/12/22: 1.626 m (5' 4\").    Weight as of this encounter: 87.1 kg (192 lb). Body surface area is 1.98 meters squared.  No Pain (0) Comment: Data Unavailable   No LMP recorded (lmp unknown). Patient is postmenopausal.  Allergies reviewed: Yes  Medications reviewed: Yes    Medications: Medication refills not needed today.  Pharmacy name entered into EPIC:    [facility]  [facility] - [place], [place] - [address]  [facility] - [place], [place] - [address] [place] [address]  [facility] - [address] OUTPATIENT PHARMACY  [facility] - [place], [place] -  [facility] [id number]    Clinical concerns: none       Sofiya Pyane CMA            "

## 2022-01-18 NOTE — PROGRESS NOTES
PRESENTING COMPLAINT:  Postoperative visit status post bilateral breast reduction done 01/12/2022.    HISTORY OF PRESENTING COMPLAINT:  Ms. Marks is 76 years old.  She is about a week out from surgery, overall doing well.  Happy with the results.  No issues.  Pathology showed benign breast tissue.    PHYSICAL EXAMINATION:  Vital signs stable.  She is afebrile, in no obvious distress.  Both breasts are healing.  Right nipple is slightly dusky, but seems to be healing in.  No obvious infection.    ASSESSMENT AND PLAN:  Based on the above findings, a diagnosis of bilateral breast reduction was made.  I am a little bit concerned about her right nipple.  We will see her back in a couple of weeks to monitor her progress.  Otherwise, she will continue to moisturize.  I let her know that her pathology was benign.  She was happy the visit.  All exam and discussion from beginning to end done in presence of my nurse, Janet Guzman.

## 2022-01-18 NOTE — LETTER
1/18/2022         RE: Marilee Marks  [address]  [place] [address]        Dear Colleague,    Thank you for referring your patient, Marilee Marks, to the [facility]. Please see a copy of my visit note below.    PRESENTING COMPLAINT:  Postoperative visit status post bilateral breast reduction done 01/12/2022.    HISTORY OF PRESENTING COMPLAINT:  Ms. Marks is 76 years old.  She is about a week out from surgery, overall doing well.  Happy with the results.  No issues.  Pathology showed benign breast tissue.    PHYSICAL EXAMINATION:  Vital signs stable.  She is afebrile, in no obvious distress.  Both breasts are healing.  Right nipple is slightly dusky, but seems to be healing in.  No obvious infection.    ASSESSMENT AND PLAN:  Based on the above findings, a diagnosis of bilateral breast reduction was made.  I am a little bit concerned about her right nipple.  We will see her back in a couple of weeks to monitor her progress.  Otherwise, she will continue to moisturize.  I let her know that her pathology was benign.  She was happy the visit.  All exam and discussion from beginning to end done in presence of my nurse, Janet Guzman.      FELIX Madrigal MD

## 2022-02-01 ENCOUNTER — MYC MEDICAL ADVICE (OUTPATIENT)
Dept: PLASTIC SURGERY | Facility: CLINIC | Age: 77
End: 2022-02-01
Payer: COMMERCIAL

## 2022-02-01 ENCOUNTER — TELEPHONE (OUTPATIENT)
Dept: ONCOLOGY | Facility: CLINIC | Age: 77
End: 2022-02-01
Payer: COMMERCIAL

## 2022-02-01 NOTE — CONFIDENTIAL NOTE
Right breast is not healing like the left breast is.Right side looks so much better than the left.    There is a large scab on the nipple, it is red and sore around the nipple.   No discharge.   No fever.   Not warm to touch.   Is going to send in pictures of breasts to Dr Madrigal.     Wondering if needs to come in for visit with Dr Madrigal.     13:26 Spoke with Marilee DAY and she will have Dr Madrigal's nurse call the patient.

## 2022-02-02 ENCOUNTER — OFFICE VISIT (OUTPATIENT)
Dept: PLASTIC SURGERY | Facility: CLINIC | Age: 77
End: 2022-02-02
Payer: COMMERCIAL

## 2022-02-02 VITALS
SYSTOLIC BLOOD PRESSURE: 167 MMHG | HEART RATE: 71 BPM | OXYGEN SATURATION: 98 % | DIASTOLIC BLOOD PRESSURE: 82 MMHG | TEMPERATURE: 98.1 F

## 2022-02-02 DIAGNOSIS — N62 HYPERTROPHY OF BREAST: Primary | ICD-10-CM

## 2022-02-02 PROCEDURE — 99024 POSTOP FOLLOW-UP VISIT: CPT | Performed by: PLASTIC SURGERY

## 2022-02-02 RX ORDER — SULFAMETHOXAZOLE/TRIMETHOPRIM 800-160 MG
1 TABLET ORAL 2 TIMES DAILY
Qty: 14 TABLET | Refills: 0 | Status: SHIPPED | OUTPATIENT
Start: 2022-02-02 | End: 2022-02-09

## 2022-02-02 ASSESSMENT — PAIN SCALES - GENERAL: PAINLEVEL: MODERATE PAIN (5)

## 2022-02-02 NOTE — PROGRESS NOTES
Service Date: 2022    PRESENTING COMPLAINT:  Postoperative visit status post bilateral breast reduction done on 2022.    HISTORY OF PRESENTING COMPLAINT:  Ms. Marks 76 years old, underwent the above-named procedure.  She had some wound healing issues to the right nipple and had her come in to see us today.  She definitely has a scab there, no pain, otherwise doing well.    PHYSICAL EXAMINATION:  Vital signs stable.  She is afebrile, in no obvious distress.  Her left breast has healed well.  Right breast is healing except the nipple has ischemic necrosis with a scab on it.  No evidence for breakdown yet.  There is some erythema in the surrounding area, but does not look cellulitic.    ASSESSMENT AND PLAN:  Based on above findings, a diagnosis of bilateral breast reduction with right breast nipple necrosis was made.  Plan is to keep the scab in place.  Continue with Vaseline moisturization and allow this to heal in secondarily.  We will place her on a 7-day course of Bactrim given the redness.  I want to see her back every week.  She understood.  All questions were answered.  She was happy with the visit.  All exam and discussion from beginning to end done in presence of my nurse, Janet Guzman.    FELIX Madrigal MD        D: 2022   T: 2022   MT: NOY    Name:     BARB MARKS  MRN:      [id number]        Account:      [id number]   :      1945           Service Date: 2022       Document: [id number]

## 2022-02-02 NOTE — NURSING NOTE
Chief Complaint   Patient presents with     Surgical Followup     F/U reduction -- nipple necrosis -- DOS 1/12       Vitals:    02/02/22 1135   BP: (!) 167/82   Pulse: 71   Temp: 98.1  F (36.7  C)   TempSrc: Oral   SpO2: 98%       There is no height or weight on file to calculate BMI.          QUIQUE GALLOWAY EMT

## 2022-02-02 NOTE — LETTER
2022       RE: Barb Marks  [address]  [place] [address]     Dear Colleague,    Thank you for referring your patient, Barb Marks, to the [facility] [place] at [facility]. Please see a copy of my visit note below.    Service Date: 2022    PRESENTING COMPLAINT:  Postoperative visit status post bilateral breast reduction done on 2022.    HISTORY OF PRESENTING COMPLAINT:  Ms. Marks 76 years old, underwent the above-named procedure.  She had some wound healing issues to the right nipple and had her come in to see us today.  She definitely has a scab there, no pain, otherwise doing well.    PHYSICAL EXAMINATION:  Vital signs stable.  She is afebrile, in no obvious distress.  Her left breast has healed well.  Right breast is healing except the nipple has ischemic necrosis with a scab on it.  No evidence for breakdown yet.  There is some erythema in the surrounding area, but does not look cellulitic.    ASSESSMENT AND PLAN:  Based on above findings, a diagnosis of bilateral breast reduction with right breast nipple necrosis was made.  Plan is to keep the scab in place.  Continue with Vaseline moisturization and allow this to heal in secondarily.  We will place her on a 7-day course of Bactrim given the redness.  I want to see her back every week.  She understood.  All questions were answered.  She was happy with the visit.  All exam and discussion from beginning to end done in presence of my nurse, Janet Guzman.    FELIX Madrigal MD    D: 2022   T: 2022   MT: NOY    Name:     BARB MARKS  MRN:      [id number]        Account:      [id number]   :      1945           Service Date: 2022       Document: [id number]

## 2022-02-15 ENCOUNTER — OFFICE VISIT (OUTPATIENT)
Dept: PLASTIC SURGERY | Facility: CLINIC | Age: 77
End: 2022-02-15
Attending: PLASTIC SURGERY
Payer: COMMERCIAL

## 2022-02-15 VITALS
WEIGHT: 188 LBS | DIASTOLIC BLOOD PRESSURE: 74 MMHG | BODY MASS INDEX: 32.27 KG/M2 | SYSTOLIC BLOOD PRESSURE: 173 MMHG | HEART RATE: 78 BPM | OXYGEN SATURATION: 99 % | TEMPERATURE: 98.5 F

## 2022-02-15 DIAGNOSIS — N62 HYPERTROPHY OF BREAST: Primary | ICD-10-CM

## 2022-02-15 PROCEDURE — 99024 POSTOP FOLLOW-UP VISIT: CPT | Performed by: PLASTIC SURGERY

## 2022-02-15 PROCEDURE — G0463 HOSPITAL OUTPT CLINIC VISIT: HCPCS

## 2022-02-15 ASSESSMENT — PAIN SCALES - GENERAL: PAINLEVEL: NO PAIN (0)

## 2022-02-15 NOTE — NURSING NOTE
"Oncology Rooming Note    February 15, 2022 10:09 AM   Marilee Marks is a 76 year old female who presents for:    Chief Complaint   Patient presents with     Oncology Clinic Visit     nipple necrosis after reduction     Initial Vitals: BP (!) 173/74 (BP Location: Right arm, Patient Position: Sitting)   Pulse 78   Temp 98.5  F (36.9  C) (Oral)   Wt 85.3 kg (188 lb)   LMP  (LMP Unknown)   SpO2 99%   BMI 32.27 kg/m   Estimated body mass index is 32.27 kg/m  as calculated from the following:    Height as of 1/12/22: 1.626 m (5' 4\").    Weight as of this encounter: 85.3 kg (188 lb). Body surface area is 1.96 meters squared.  No Pain (0) Comment: Data Unavailable   No LMP recorded (lmp unknown). Patient is postmenopausal.  Allergies reviewed: Yes  Medications reviewed: Yes    Medications: Medication refills not needed today.  Pharmacy name entered into EPIC:    [facility]  [facility] - [place], [place] - [address]  [facility] - [place], [place] - [address] [place] [address]  [facility] - [address] OUTPATIENT PHARMACY  [facility] - [place], [place] - [id number]    Clinical concerns: none       Aditya Scales            "

## 2022-02-15 NOTE — LETTER
2/15/2022         RE: Marilee Marks  [address]  [place] [address]        Dear Colleague,    Thank you for referring your patient, Marilee Marks, to the [facility]. Please see a copy of my visit note below.    PRESENTING COMPLAINT:  Postoperative visit status post bilateral breast reduction done 01/12/2022 with right nipple necrosis.    HISTORY OF PRESENTING COMPLAINT:  Ms. Marks 76 years old, here for regular postoperative visit.  No major change, off of all antibiotics.  No pain.    PHYSICAL EXAMINATION:  Vital signs stable.  She is afebrile, in no acute distress.  The right nipple scab is now liquefying, no evidence of infection.    ASSESSMENT AND PLAN:  Based on the above findings, a diagnosis of right breast nipple necrosis, status post bilateral breast reduction was made.  Debrided the necrotic nipple to healthy bleeding tissue, and have advised daily dressing changes.  I will see her back every 2 weeks.  All questions answered.  She was happy with the visit.  All exam and discussion done in presence of my nurse, Janet Guzman.            Again, thank you for allowing me to participate in the care of your patient.        Sincerely,        FELIX Madrigal MD

## 2022-05-04 ENCOUNTER — MYC MEDICAL ADVICE (OUTPATIENT)
Dept: FAMILY MEDICINE | Facility: CLINIC | Age: 77
End: 2022-05-04
Payer: COMMERCIAL

## 2022-05-04 ASSESSMENT — PATIENT HEALTH QUESTIONNAIRE - PHQ9
SUM OF ALL RESPONSES TO PHQ QUESTIONS 1-9: 0
SUM OF ALL RESPONSES TO PHQ QUESTIONS 1-9: 0
10. IF YOU CHECKED OFF ANY PROBLEMS, HOW DIFFICULT HAVE THESE PROBLEMS MADE IT FOR YOU TO DO YOUR WORK, TAKE CARE OF THINGS AT HOME, OR GET ALONG WITH OTHER PEOPLE: NOT DIFFICULT AT ALL

## 2022-05-04 ASSESSMENT — ANXIETY QUESTIONNAIRES
5. BEING SO RESTLESS THAT IT IS HARD TO SIT STILL: NOT AT ALL
GAD7 TOTAL SCORE: 0
4. TROUBLE RELAXING: NOT AT ALL
7. FEELING AFRAID AS IF SOMETHING AWFUL MIGHT HAPPEN: NOT AT ALL
1. FEELING NERVOUS, ANXIOUS, OR ON EDGE: NOT AT ALL
3. WORRYING TOO MUCH ABOUT DIFFERENT THINGS: NOT AT ALL
6. BECOMING EASILY ANNOYED OR IRRITABLE: NOT AT ALL
GAD7 TOTAL SCORE: 0
2. NOT BEING ABLE TO STOP OR CONTROL WORRYING: NOT AT ALL
7. FEELING AFRAID AS IF SOMETHING AWFUL MIGHT HAPPEN: NOT AT ALL
GAD7 TOTAL SCORE: 0

## 2022-05-05 ASSESSMENT — ENCOUNTER SYMPTOMS
COUGH: 0
JOINT SWELLING: 0
DIARRHEA: 0
HEMATOCHEZIA: 0
CONSTIPATION: 0
BREAST MASS: 1
NERVOUS/ANXIOUS: 0
PARESTHESIAS: 0
FREQUENCY: 0
FEVER: 0
CHILLS: 0
DIZZINESS: 0
WEAKNESS: 0
HEARTBURN: 0
ARTHRALGIAS: 1
SORE THROAT: 0
HEMATURIA: 0
HEADACHES: 0
PALPITATIONS: 0
DYSURIA: 0
ABDOMINAL PAIN: 0
EYE PAIN: 0
MYALGIAS: 1
NAUSEA: 0
SHORTNESS OF BREATH: 0

## 2022-05-05 ASSESSMENT — ACTIVITIES OF DAILY LIVING (ADL): CURRENT_FUNCTION: NO ASSISTANCE NEEDED

## 2022-05-05 ASSESSMENT — ANXIETY QUESTIONNAIRES: GAD7 TOTAL SCORE: 0

## 2022-05-09 ENCOUNTER — OFFICE VISIT (OUTPATIENT)
Dept: FAMILY MEDICINE | Facility: CLINIC | Age: 77
End: 2022-05-09
Payer: COMMERCIAL

## 2022-05-09 VITALS
HEART RATE: 72 BPM | TEMPERATURE: 98.6 F | SYSTOLIC BLOOD PRESSURE: 158 MMHG | WEIGHT: 196 LBS | OXYGEN SATURATION: 99 % | DIASTOLIC BLOOD PRESSURE: 72 MMHG | RESPIRATION RATE: 16 BRPM | BODY MASS INDEX: 33.64 KG/M2

## 2022-05-09 DIAGNOSIS — F32.5 MAJOR DEPRESSION IN REMISSION (H): ICD-10-CM

## 2022-05-09 DIAGNOSIS — N61.0 MASTITIS, LEFT, ACUTE: Primary | ICD-10-CM

## 2022-05-09 PROCEDURE — 99213 OFFICE O/P EST LOW 20 MIN: CPT | Mod: 25 | Performed by: NURSE PRACTITIONER

## 2022-05-09 PROCEDURE — 91305 COVID-19,PF,PFIZER (12+ YRS): CPT | Performed by: NURSE PRACTITIONER

## 2022-05-09 PROCEDURE — 0054A COVID-19,PF,PFIZER (12+ YRS): CPT | Performed by: NURSE PRACTITIONER

## 2022-05-09 ASSESSMENT — PAIN SCALES - GENERAL: PAINLEVEL: NO PAIN (0)

## 2022-05-09 NOTE — PROGRESS NOTES
"  Assessment & Plan     Mastitis, left, acute  Will start treatment today. Small pus pocket present at incisional site. Follow up if no improvement. Advised to have this re-evaluated at upcoming appointment.   - amoxicillin-clavulanate (AUGMENTIN) 875-125 MG tablet; Take 1 tablet by mouth 2 times daily for 10 days    Major depression in remission (H)  PHQ-9 - 0 continue on medications without change. Doing well.                BMI:   Estimated body mass index is 33.64 kg/m  as calculated from the following:    Height as of 1/12/22: 1.626 m (5' 4\").    Weight as of this encounter: 88.9 kg (196 lb).           Return in about 3 days (around 5/12/2022) for Routine preventive as scheduled. .    ALANIS Camarillo CNP  [facility]   Marilee is a 76 year old who presents for the following health issues:    History of Present Illness       Reason for visit:  Possible infection  Symptom onset:  3-7 days ago    She eats 2-3 servings of fruits and vegetables daily.She consumes 0 sweetened beverage(s) daily.She exercises with enough effort to increase her heart rate 10 to 19 minutes per day.  She exercises with enough effort to increase her heart rate 3 or less days per week.   She is taking medications regularly.       Breast Concern  Onset/Duration: last Wednesday   Description:   Location: Left nipple area   Pain or tenderness: YES initially , some tenderness with palpation. Otherwise improved.   Redness: no  Intensity: Feels almost resolved.   Progression of Symptoms: improving  Accompanying Signs & Symptoms:  Any lumps in axillary region: no  Movable: not applicable  Nipple discharge: whitish  On Hormone therapy: no  Does it change with menstrual cycle: not applicable  Previous history of similar problem: Initially after surgery   First degree relative with breast cancer: a negative family history for breast cancer.  Therapies tried and outcome: Took Amoxicillin that she had left " over from dental visits. (8 tabs total)    Had a breast reduction in Jan 2022. She had a bad infection in the right breast. Redness and discharge started last week over previous incision. She noticed yellow pus discharge at the incision which was previously healed.      No LMP recorded (lmp unknown). Patient is postmenopausal.    Review of Systems   Constitutional, HEENT, cardiovascular, pulmonary, gi and gu systems are negative, except as otherwise noted.      Objective    BP (!) 158/72   Pulse 72   Temp 98.6  F (37  C) (Tympanic)   Resp 16   Wt 88.9 kg (196 lb)   LMP  (LMP Unknown)   SpO2 99%   BMI 33.64 kg/m    Body mass index is 33.64 kg/m .  Physical Exam   GENERAL: healthy, alert and no distress  SKIN: Left breast above nipple 12 oclock position at healed incision from breast reduction surgery. 2mm opening with yellow watery discharge present. Slight pain to palpation and warmth present. Quarter sized erythremia surrounding lesion.

## 2022-05-12 ENCOUNTER — OFFICE VISIT (OUTPATIENT)
Dept: FAMILY MEDICINE | Facility: CLINIC | Age: 77
End: 2022-05-12
Payer: COMMERCIAL

## 2022-05-12 VITALS
HEART RATE: 78 BPM | RESPIRATION RATE: 16 BRPM | WEIGHT: 190 LBS | SYSTOLIC BLOOD PRESSURE: 130 MMHG | BODY MASS INDEX: 32.44 KG/M2 | HEIGHT: 64 IN | OXYGEN SATURATION: 96 % | TEMPERATURE: 97.8 F | DIASTOLIC BLOOD PRESSURE: 60 MMHG

## 2022-05-12 DIAGNOSIS — Z13.220 SCREENING FOR HYPERLIPIDEMIA: ICD-10-CM

## 2022-05-12 DIAGNOSIS — Z11.52 ENCOUNTER FOR SCREENING FOR COVID-19: ICD-10-CM

## 2022-05-12 DIAGNOSIS — F43.23 ADJUSTMENT DISORDER WITH MIXED ANXIETY AND DEPRESSED MOOD: ICD-10-CM

## 2022-05-12 DIAGNOSIS — E03.9 HYPOTHYROIDISM, UNSPECIFIED TYPE: ICD-10-CM

## 2022-05-12 DIAGNOSIS — N18.31 STAGE 3A CHRONIC KIDNEY DISEASE (H): ICD-10-CM

## 2022-05-12 DIAGNOSIS — F32.1 MODERATE MAJOR DEPRESSION (H): ICD-10-CM

## 2022-05-12 DIAGNOSIS — Z00.00 ENCOUNTER FOR MEDICARE ANNUAL WELLNESS EXAM: Primary | ICD-10-CM

## 2022-05-12 LAB
ANION GAP SERPL CALCULATED.3IONS-SCNC: 3 MMOL/L (ref 3–14)
BUN SERPL-MCNC: 20 MG/DL (ref 7–30)
CALCIUM SERPL-MCNC: 10.1 MG/DL (ref 8.5–10.1)
CHLORIDE BLD-SCNC: 111 MMOL/L (ref 94–109)
CHOLEST SERPL-MCNC: 166 MG/DL
CO2 SERPL-SCNC: 26 MMOL/L (ref 20–32)
CREAT SERPL-MCNC: 0.85 MG/DL (ref 0.52–1.04)
CREAT UR-MCNC: 68 MG/DL
FASTING STATUS PATIENT QL REPORTED: YES
GFR SERPL CREATININE-BSD FRML MDRD: 71 ML/MIN/1.73M2
GLUCOSE BLD-MCNC: 89 MG/DL (ref 70–99)
HDLC SERPL-MCNC: 58 MG/DL
HGB BLD-MCNC: 12.1 G/DL (ref 11.7–15.7)
LDLC SERPL CALC-MCNC: 86 MG/DL
MICROALBUMIN UR-MCNC: 5 MG/L
MICROALBUMIN/CREAT UR: 7.35 MG/G CR (ref 0–25)
NONHDLC SERPL-MCNC: 108 MG/DL
POTASSIUM BLD-SCNC: 4.1 MMOL/L (ref 3.4–5.3)
SODIUM SERPL-SCNC: 140 MMOL/L (ref 133–144)
T4 FREE SERPL-MCNC: 1.24 NG/DL (ref 0.76–1.46)
TRIGL SERPL-MCNC: 110 MG/DL
TSH SERPL DL<=0.005 MIU/L-ACNC: 0.03 MU/L (ref 0.4–4)

## 2022-05-12 PROCEDURE — 82043 UR ALBUMIN QUANTITATIVE: CPT | Performed by: FAMILY MEDICINE

## 2022-05-12 PROCEDURE — 80048 BASIC METABOLIC PNL TOTAL CA: CPT | Performed by: FAMILY MEDICINE

## 2022-05-12 PROCEDURE — 80061 LIPID PANEL: CPT | Performed by: FAMILY MEDICINE

## 2022-05-12 PROCEDURE — 99214 OFFICE O/P EST MOD 30 MIN: CPT | Mod: 25 | Performed by: FAMILY MEDICINE

## 2022-05-12 PROCEDURE — 99397 PER PM REEVAL EST PAT 65+ YR: CPT | Performed by: FAMILY MEDICINE

## 2022-05-12 PROCEDURE — 36415 COLL VENOUS BLD VENIPUNCTURE: CPT | Performed by: FAMILY MEDICINE

## 2022-05-12 PROCEDURE — 85018 HEMOGLOBIN: CPT | Performed by: FAMILY MEDICINE

## 2022-05-12 PROCEDURE — 84439 ASSAY OF FREE THYROXINE: CPT | Performed by: FAMILY MEDICINE

## 2022-05-12 PROCEDURE — 84443 ASSAY THYROID STIM HORMONE: CPT | Performed by: FAMILY MEDICINE

## 2022-05-12 RX ORDER — LEVOTHYROXINE SODIUM 112 UG/1
112 TABLET ORAL DAILY
Qty: 90 TABLET | Refills: 1 | Status: SHIPPED | OUTPATIENT
Start: 2022-05-12 | End: 2023-01-27

## 2022-05-12 RX ORDER — BUPROPION HYDROCHLORIDE 150 MG/1
TABLET ORAL
Qty: 90 TABLET | Refills: 3 | Status: SHIPPED | OUTPATIENT
Start: 2022-05-12 | End: 2023-05-03

## 2022-05-12 RX ORDER — LEVOTHYROXINE SODIUM 125 UG/1
125 TABLET ORAL DAILY
Qty: 90 TABLET | Refills: 3 | Status: SHIPPED | OUTPATIENT
Start: 2022-05-12 | End: 2022-05-12 | Stop reason: DRUGHIGH

## 2022-05-12 RX ORDER — TOPIRAMATE 50 MG/1
50 TABLET, FILM COATED ORAL 2 TIMES DAILY
Qty: 180 TABLET | Refills: 3 | Status: SHIPPED | OUTPATIENT
Start: 2022-05-12 | End: 2023-05-15

## 2022-05-12 ASSESSMENT — ENCOUNTER SYMPTOMS
HEMATURIA: 0
ARTHRALGIAS: 1
HEARTBURN: 0
PARESTHESIAS: 0
DIARRHEA: 0
ABDOMINAL PAIN: 0
DYSURIA: 0
PALPITATIONS: 0
SHORTNESS OF BREATH: 0
NAUSEA: 0
NERVOUS/ANXIOUS: 0
DIZZINESS: 0
WEAKNESS: 0
FREQUENCY: 0
MYALGIAS: 1
EYE PAIN: 0
CONSTIPATION: 0
COUGH: 0
HEADACHES: 0
JOINT SWELLING: 0
SORE THROAT: 0
CHILLS: 0
HEMATOCHEZIA: 0
FEVER: 0
BREAST MASS: 1

## 2022-05-12 ASSESSMENT — ACTIVITIES OF DAILY LIVING (ADL): CURRENT_FUNCTION: NO ASSISTANCE NEEDED

## 2022-05-12 ASSESSMENT — PAIN SCALES - GENERAL: PAINLEVEL: MODERATE PAIN (4)

## 2022-05-12 NOTE — PATIENT INSTRUCTIONS
Our Clinic hours are:  Mondays    7:20 am - 7 pm  Tues - Fri  7:20 am - 5 pm    Clinic Phone: [phone]    The clinic lab opens at 7:30 am Mon - Fri and appointments are required.    [facility]. [phone]  Monday  8 am - 7pm  Tues - Fri 8 am - 5:30 pm       Patient Education   Personalized Prevention Plan  You are due for the preventive services outlined below.  Your care team is available to assist you in scheduling these services.  If you have already completed any of these items, please share that information with your care team to update in your medical record.  Health Maintenance Due   Topic Date Due     Kidney Microalbumin Urine Test  Never done     Urine Test  Never done     Discuss Advance Care Planning  10/17/2016     FALL RISK ASSESSMENT  10/15/2019     Cholesterol Lab  09/23/2021     Thyroid Function Lab  01/28/2022     Hemoglobin  01/28/2022

## 2022-05-12 NOTE — PROGRESS NOTES
"SUBJECTIVE:   Marilee Marks is a 76 year old female who presents for Preventive Visit.      Patient has been advised of split billing requirements and indicates understanding: Yes  Are you in the first 12 months of your Medicare coverage?  No    Healthy Habits:     In general, how would you rate your overall health?  Excellent    Duration of exercise:  15-30 minutes    Do you usually eat at least 4 servings of fruit and vegetables a day, include whole grains    & fiber and avoid regularly eating high fat or \"junk\" foods?  Yes    Taking medications regularly:  Yes    Medication side effects:  Not applicable    Ability to successfully perform activities of daily living:  No assistance needed    Home Safety:  No safety concerns identified    Hearing Impairment:  No hearing concerns    In the past 6 months, have you been bothered by leaking of urine?  No    In general, how would you rate your overall mental or emotional health?  Excellent      PHQ-2 Total Score: 0    Additional concerns today:  Yes    Do you feel safe in your environment? Yes    Have you ever done Advance Care Planning? (For example, a Health Directive, POLST, or a discussion with a medical provider or your loved ones about your wishes): Yes, advance care planning is on file.       Fall risk  Fallen 2 or more times in the past year?: No  Any fall with injury in the past year?: No    Cognitive Screening   1) Repeat 3 items (Leader, Season, Table)    2) Clock draw: NORMAL  3) 3 item recall: Recalls 3 objects  Results: 3 items recalled: COGNITIVE IMPAIRMENT LESS LIKELY    Mini-CogTM Copyright ANGELA Guzman. Licensed by the author for use in [facility]; reprinted with permission (gael@.[facility]). All rights reserved.      Do you have sleep apnea, excessive snoring or daytime drowsiness?: no    Reviewed and updated as needed this visit by clinical staff   Tobacco  Allergies  Meds   Med Hx  Surg Hx  Fam Hx  Soc Hx          Reviewed and " updated as needed this visit by Provider                   Social History     Tobacco Use     Smoking status: Never Smoker     Smokeless tobacco: Never Used   Substance Use Topics     Alcohol use: Not Currently     If you drink alcohol do you typically have >3 drinks per day or >7 drinks per week? No    Alcohol Use 5/5/2022   Prescreen: >3 drinks/day or >7 drinks/week? No   Prescreen: >3 drinks/day or >7 drinks/week? -   No flowsheet data found.        Hypertension Follow-up      Do you check your blood pressure regularly outside of the clinic? Yes     Are you following a low salt diet? Yes    Are your blood pressures ever more than 140 on the top number (systolic) OR more   than 90 on the bottom number (diastolic), for example 140/90? Yes- varies    Hypothyroidism Follow-up      Since last visit, patient describes the following symptoms: Weight stable, no hair loss, no skin changes, no constipation, no loose stools      Current providers sharing in care for this patient include:   Patient Care Team:  Tomasa Rios MD as PCP - General (Family Practice)  Luzma Finn MD as MD (Internal Medicine)  Tomasa Rios MD as Referring Physician (Family Practice)  Manuel Reich MD as Assigned Musculoskeletal Provider  FELIX Madrigal MD as Assigned Surgical Provider  Tomasa Rios MD as Assigned PCP  Earnest Pierce MD as Assigned Heart and Vascular Provider    The following health maintenance items are reviewed in Epic and correct as of today:  Health Maintenance Due   Topic Date Due     MICROALBUMIN  Never done     URINALYSIS  Never done     ADVANCE CARE PLANNING  10/17/2016     FALL RISK ASSESSMENT  10/15/2019     LIPID  09/23/2021     TSH W/FREE T4 REFLEX  01/28/2022     HEMOGLOBIN  01/28/2022     Lab work is in process      Mammogram Screening - Patient over age 75, has elected to discontinue screenings.  Pertinent mammograms are reviewed under the imaging tab.    Review of  "Systems   Constitutional: Negative for chills and fever.   HENT: Negative for congestion, ear pain, hearing loss and sore throat.    Eyes: Negative for pain and visual disturbance.   Respiratory: Negative for cough and shortness of breath.    Cardiovascular: Negative for chest pain, palpitations and peripheral edema.   Gastrointestinal: Negative for abdominal pain, constipation, diarrhea, heartburn, hematochezia and nausea.   Breasts:  Positive for breast mass and discharge. Negative for tenderness.   Genitourinary: Negative for dysuria, frequency, genital sores, hematuria, pelvic pain, urgency, vaginal bleeding and vaginal discharge.   Musculoskeletal: Positive for arthralgias and myalgias. Negative for joint swelling.   Skin: Negative for rash.   Neurological: Negative for dizziness, weakness, headaches and paresthesias.   Psychiatric/Behavioral: Positive for mood changes. The patient is not nervous/anxious.        OBJECTIVE:   /60   Pulse 78   Temp 97.8  F (36.6  C) (Tympanic)   Resp 16   Ht 1.626 m (5' 4\")   Wt 86.2 kg (190 lb)   LMP  (LMP Unknown)   SpO2 96%   Breastfeeding No   BMI 32.61 kg/m   Estimated body mass index is 32.61 kg/m  as calculated from the following:    Height as of this encounter: 1.626 m (5' 4\").    Weight as of this encounter: 86.2 kg (190 lb).  Physical Exam  GENERAL: healthy, alert and no distress  NECK: no adenopathy, no asymmetry, masses, or scars and thyroid normal to palpation  RESP: lungs clear to auscultation - no rales, rhonchi or wheezes  BREAST: right breast without a nipple (see old pictures in the medical chart).  Patient is s/p breast reduction mammoplasty  Left breast with a pinhole opening at 12 oclock at the border of the areola with minimal serous fluid drainage.    CV: regular rate and rhythm, normal S1 S2, no S3 or S4, no murmur, click or rub, no peripheral edema and peripheral pulses strong  ABDOMEN: soft, nontender, no hepatosplenomegaly, no masses and " "bowel sounds normal  MS: no gross musculoskeletal defects noted, no edema    Diagnostic Test Results:  Labs reviewed in Epic    ASSESSMENT / PLAN:   (Z00.00) Encounter for Medicare annual wellness exam  (primary encounter diagnosis)  Comment:    Plan:      (E03.9) Hypothyroidism, unspecified type  Comment: clinically euthyroid  Plan: levothyroxine (SYNTHROID/LEVOTHROID) 125 MCG         tablet, TSH with free T4 reflex             (N18.31) Stage 3a chronic kidney disease (H)  Comment:    Plan: Basic metabolic panel  (Ca, Cl, CO2, Creat,         Gluc, K, Na, BUN), Albumin Random Urine         Quantitative with Creat Ratio, Hemoglobin             (Z68.35) BMI 35.0-35.9,adult  Comment:    Plan: topiramate (TOPAMAX) 50 MG tablet             (F32.1) Moderate major depression (H)  Comment:  Well controlled  Plan: buPROPion (WELLBUTRIN XL) 150 MG 24 hr tablet             (F43.23) Adjustment disorder with mixed anxiety and depressed mood  Comment:    Plan: buPROPion (WELLBUTRIN XL) 150 MG 24 hr tablet             (Z13.220) Screening for hyperlipidemia  Comment:    Plan: Lipid panel reflex to direct LDL Fasting             (Z11.52) Encounter for screening for COVID-19  Comment:    Plan: Asymptomatic COVID-19 Virus (Coronavirus) by         PCR               Patient has been advised of split billing requirements and indicates understanding: Yes    COUNSELING:  Reviewed preventive health counseling, as reflected in patient instructions       Regular exercise       Healthy diet/nutrition    Estimated body mass index is 32.61 kg/m  as calculated from the following:    Height as of this encounter: 1.626 m (5' 4\").    Weight as of this encounter: 86.2 kg (190 lb).    Weight management plan: Discussed healthy diet and exercise guidelines    She reports that she has never smoked. She has never used smokeless tobacco.      Appropriate preventive services were discussed with this patient, including applicable screening as appropriate for " cardiovascular disease, diabetes, osteopenia/osteoporosis, and glaucoma.  As appropriate for age/gender, discussed screening for colorectal cancer, prostate cancer, breast cancer, and cervical cancer. Checklist reviewing preventive services available has been given to the patient.    Reviewed patients plan of care and provided an AVS. The Basic Care Plan (routine screening as documented in Health Maintenance) for Marilee meets the Care Plan requirement. This Care Plan has been established and reviewed with the Patient.    Counseling Resources:  ATP IV Guidelines  Pooled Cohorts Equation Calculator  Breast Cancer Risk Calculator  Breast Cancer: Medication to Reduce Risk  FRAX Risk Assessment  ICSI Preventive Guidelines  Dietary Guidelines for Americans, 2010  USDA's MyPlate  ASA Prophylaxis  Lung CA Screening    Tomasa Rios MD  [facility]    Identified Health Risks:

## 2022-06-01 ENCOUNTER — E-VISIT (OUTPATIENT)
Dept: FAMILY MEDICINE | Facility: CLINIC | Age: 77
End: 2022-06-01
Payer: COMMERCIAL

## 2022-06-01 DIAGNOSIS — T81.49XA INCISIONAL INFECTION: ICD-10-CM

## 2022-06-01 DIAGNOSIS — N61.0 MASTITIS, LEFT, ACUTE: Primary | ICD-10-CM

## 2022-06-01 PROCEDURE — 99421 OL DIG E/M SVC 5-10 MIN: CPT | Performed by: NURSE PRACTITIONER

## 2022-06-02 ENCOUNTER — MYC REFILL (OUTPATIENT)
Dept: FAMILY MEDICINE | Facility: CLINIC | Age: 77
End: 2022-06-02
Payer: COMMERCIAL

## 2022-06-02 ENCOUNTER — MYC MEDICAL ADVICE (OUTPATIENT)
Dept: FAMILY MEDICINE | Facility: CLINIC | Age: 77
End: 2022-06-02
Payer: COMMERCIAL

## 2022-06-02 DIAGNOSIS — N61.0 MASTITIS, LEFT, ACUTE: ICD-10-CM

## 2022-06-02 RX ORDER — CEPHALEXIN 500 MG/1
500 CAPSULE ORAL 2 TIMES DAILY
Qty: 14 CAPSULE | Refills: 0 | Status: SHIPPED | OUTPATIENT
Start: 2022-06-02 | End: 2022-06-09

## 2022-06-02 NOTE — TELEPHONE ENCOUNTER
Responded to e visit. Would like to treat with keflex instead of augmentin.   ALANIS Camarillo CNP

## 2022-06-02 NOTE — TELEPHONE ENCOUNTER
Routing refill request to provider for review/approval because:  Drug not on the G refill protocol     Pending Prescriptions:                       Disp   Refills    amoxicillin-clavulanate (AUGMENTIN) 875-12*20 tab*0        Sig: Take 1 tablet by mouth 2 times daily    Amanda Guzman RN on 6/2/2022 at 11:10 AM

## 2022-06-05 ENCOUNTER — LAB (OUTPATIENT)
Dept: LAB | Facility: CLINIC | Age: 77
End: 2022-06-05
Attending: FAMILY MEDICINE
Payer: COMMERCIAL

## 2022-06-05 DIAGNOSIS — Z11.52 ENCOUNTER FOR SCREENING FOR COVID-19: ICD-10-CM

## 2022-06-05 PROCEDURE — U0005 INFEC AGEN DETEC AMPLI PROBE: HCPCS

## 2022-06-05 PROCEDURE — U0003 INFECTIOUS AGENT DETECTION BY NUCLEIC ACID (DNA OR RNA); SEVERE ACUTE RESPIRATORY SYNDROME CORONAVIRUS 2 (SARS-COV-2) (CORONAVIRUS DISEASE [COVID-19]), AMPLIFIED PROBE TECHNIQUE, MAKING USE OF HIGH THROUGHPUT TECHNOLOGIES AS DESCRIBED BY CMS-2020-01-R: HCPCS

## 2022-06-06 LAB — SARS-COV-2 RNA RESP QL NAA+PROBE: NEGATIVE

## 2022-07-05 ENCOUNTER — TRANSFERRED RECORDS (OUTPATIENT)
Dept: HEALTH INFORMATION MANAGEMENT | Facility: CLINIC | Age: 77
End: 2022-07-05

## 2022-07-06 ENCOUNTER — TRANSFERRED RECORDS (OUTPATIENT)
Dept: HEALTH INFORMATION MANAGEMENT | Facility: CLINIC | Age: 77
End: 2022-07-06

## 2022-09-15 ENCOUNTER — LAB (OUTPATIENT)
Dept: LAB | Facility: CLINIC | Age: 77
End: 2022-09-15

## 2022-09-15 ENCOUNTER — TELEPHONE (OUTPATIENT)
Dept: FAMILY MEDICINE | Facility: CLINIC | Age: 77
End: 2022-09-15

## 2022-09-15 ENCOUNTER — ALLIED HEALTH/NURSE VISIT (OUTPATIENT)
Dept: FAMILY MEDICINE | Facility: CLINIC | Age: 77
End: 2022-09-15
Payer: COMMERCIAL

## 2022-09-15 DIAGNOSIS — Z23 NEEDS FLU SHOT: Primary | ICD-10-CM

## 2022-09-15 DIAGNOSIS — E53.8 VITAMIN B12 DEFICIENCY (NON ANEMIC): ICD-10-CM

## 2022-09-15 DIAGNOSIS — E53.8 VITAMIN B12 DEFICIENCY (NON ANEMIC): Primary | ICD-10-CM

## 2022-09-15 DIAGNOSIS — Z23 HIGH PRIORITY FOR 2019-NCOV VACCINE: ICD-10-CM

## 2022-09-15 PROCEDURE — 99207 PR NO CHARGE NURSE ONLY: CPT

## 2022-09-15 PROCEDURE — 0124A COVID-19,PF,PFIZER BOOSTER BIVALENT: CPT

## 2022-09-15 PROCEDURE — 82607 VITAMIN B-12: CPT

## 2022-09-15 PROCEDURE — 36415 COLL VENOUS BLD VENIPUNCTURE: CPT

## 2022-09-15 PROCEDURE — 91312 COVID-19,PF,PFIZER BOOSTER BIVALENT: CPT

## 2022-09-15 PROCEDURE — 90662 IIV NO PRSV INCREASED AG IM: CPT

## 2022-09-15 PROCEDURE — G0008 ADMIN INFLUENZA VIRUS VAC: HCPCS

## 2022-09-15 NOTE — TELEPHONE ENCOUNTER
Writer informed patient of Dr Rios's note.  Patient states that she will continue to get mammograms as she plans to live to be 89.  Patient was scheduled for lab appt today after her vaccine injections.    Nate ROACH [facility]

## 2022-09-15 NOTE — TELEPHONE ENCOUNTER
B12 ordered.    Would not need to have yearly mammograms as she is over 75, but if she would like to continue, this would be okay as well.    When someone likely has a life expectancy > 5 years or are in good enough health that they would choose to pursue treatment for breast cancer, it is still a good idea to do yearly mammograms.    Tomasa Rios M.D.

## 2022-09-15 NOTE — TELEPHONE ENCOUNTER
Patient is coming at 1 pm for vaccinations.    Patient calling requesting lab for B-12 level  States she used to be on B-12 injection and wondering her level  Last result 11/2012 = 1004    lab pended    Patient also wondering if she still needs yearly mammograms?  Had breast reduction surgery last year.     Amanda Guzman RN on 9/15/2022 at 10:10 AM

## 2022-09-16 LAB — VIT B12 SERPL-MCNC: 3844 PG/ML (ref 232–1245)

## 2022-09-28 ENCOUNTER — TRANSFERRED RECORDS (OUTPATIENT)
Dept: ORTHOPEDICS | Facility: CLINIC | Age: 77
End: 2022-09-28

## 2022-10-12 ENCOUNTER — TRANSFERRED RECORDS (OUTPATIENT)
Dept: ORTHOPEDICS | Facility: CLINIC | Age: 77
End: 2022-10-12

## 2022-11-14 ENCOUNTER — ANCILLARY PROCEDURE (OUTPATIENT)
Dept: MAMMOGRAPHY | Facility: CLINIC | Age: 77
End: 2022-11-14
Payer: COMMERCIAL

## 2022-11-14 DIAGNOSIS — Z12.31 VISIT FOR SCREENING MAMMOGRAM: ICD-10-CM

## 2022-11-14 PROCEDURE — 77063 BREAST TOMOSYNTHESIS BI: CPT | Mod: TC | Performed by: RADIOLOGY

## 2022-11-14 PROCEDURE — 77067 SCR MAMMO BI INCL CAD: CPT | Mod: TC | Performed by: RADIOLOGY

## 2022-11-29 DIAGNOSIS — I10 BENIGN ESSENTIAL HYPERTENSION: ICD-10-CM

## 2022-11-29 RX ORDER — HYDROCHLOROTHIAZIDE 12.5 MG/1
TABLET ORAL
Qty: 90 TABLET | Refills: 2 | Status: SHIPPED | OUTPATIENT
Start: 2022-11-29 | End: 2023-05-15

## 2023-04-25 NOTE — PROGRESS NOTES
CARDIOLOGY VISIT    REASON FOR VISIT: CAD    SUBJECTIVE:  77-year-old female seen for CAD.  She has hypertension, DVT, hypothyroid, sleep apnea.     2021 she had left bundle branch block on preop EKG.  Nuclear stress October 2021 showed nontransmural infarct of the anterior, apical, and anteroseptal segments, no ischemia, EF 60%.    Coronary CTA 2021 showed calcium score 424, 83rd percentile, moderate disease of OM1, severe disease of OM 3 (2 mm vessel), mild disease elsewhere.    She has been feeling well recently.  She will do water aerobics with no chest pain.  She is quite frustrated that she cannot lose more weight.  She eats a very healthy diet including fish, venison, and vegetables.  Blood pressure tends to run less than 130.    MEDICATIONS:  Current Outpatient Medications   Medication     buPROPion (WELLBUTRIN XL) 150 MG 24 hr tablet     CALTRATE 600+D 600-400 MG-UNIT PO TABS     celecoxib (CELEBREX) 200 MG capsule     CENTRUM SILVER PO TABS     hydrochlorothiazide (HYDRODIURIL) 12.5 MG tablet     levothyroxine (SYNTHROID/LEVOTHROID) 112 MCG tablet     topiramate (TOPAMAX) 50 MG tablet     VITAMIN D3 1000 UNIT PO CAPS     No current facility-administered medications for this visit.       ALLERGIES:  Allergies   Allergen Reactions     Lisinopril Cough     Codeine Itching       REVIEW OF SYSTEMS:  Constitutional:  No weight loss, fever, chills  HEENT:  Eyes:  No visual loss, blurred vision, double vision or yellow sclerae. No hearing loss, sneezing, congestion, runny nose or sore throat.  Skin:  No rash or itching.  Cardiovascular: per HPI  Respiratory: per HPI  GI:  No anorexia, nausea, vomiting or diarrhea. No abdominal pain or blood.  :  No dysurea, hematuria  Neurologic:  No headache, paralysis, ataxia, numbness or tingling in the extremities. No change in bowel or bladder control.  Musculoskeletal:  No muscle pain  Hematologic:  No bleeding or bruising.  Lymphatics:  No enlarged nodes. No history of  splenectomy.  Endocrine:  No reports of sweating, cold or heat intolerance. No polyuria or polydipsia.  Allergies:  No history of asthma, hives, eczema or rhinitis.    PHYSICAL EXAM:  /64 (BP Location: Right arm, Patient Position: Sitting, Cuff Size: Adult Regular)   Pulse 76   Wt 92.5 kg (204 lb)   LMP  (LMP Unknown)   SpO2 97%   BMI 35.02 kg/m      Constitutional: awake, alert, no distress  Eyes: PERRL, sclera nonicteric  ENT: trachea midline  Respiratory: Lungs clear  Cardiovascular: Regular rate and rhythm, no murmurs  GI: nondistended, nontender, bowel sounds present  Lymph/Hematologic: no lymphadenopathy  Skin: dry, no rash  Musculoskeletal: good muscle tone, strength 5/5 in upper and lower extremities  Neurologic: no focal deficits  Neuropsychiatric: appropriate affact    DATA:  Lab:   Recent Labs   Lab Test 05/12/22  0902 09/23/20  1537   CHOL 166 194   HDL 58 79   LDL 86 102*   TRIG 110 67     ASSESSMENT:  77-year-old female seen for CAD.  She is doing well with no concerning symptoms.  Blood pressure is at goal.  No need for further cardiac testing.    RECOMMENDATIONS:  1.  Coronary artery disease  -Continue current medications  -Encouraged ongoing exercise    Patient will follow-up with her primary physician, she can follow-up with cardiology if needed in the future.    Earnest Pierce MD  Cardiology - [facility]  Pager:  [phone]  Text Page  April 27, 2023

## 2023-04-27 ENCOUNTER — OFFICE VISIT (OUTPATIENT)
Dept: CARDIOLOGY | Facility: CLINIC | Age: 78
End: 2023-04-27
Payer: COMMERCIAL

## 2023-04-27 VITALS
SYSTOLIC BLOOD PRESSURE: 130 MMHG | DIASTOLIC BLOOD PRESSURE: 64 MMHG | HEART RATE: 76 BPM | WEIGHT: 204 LBS | OXYGEN SATURATION: 97 % | BODY MASS INDEX: 35.02 KG/M2

## 2023-04-27 DIAGNOSIS — I25.10 CORONARY ARTERY DISEASE INVOLVING NATIVE CORONARY ARTERY OF NATIVE HEART WITHOUT ANGINA PECTORIS: Primary | ICD-10-CM

## 2023-04-27 PROCEDURE — 99214 OFFICE O/P EST MOD 30 MIN: CPT | Performed by: INTERNAL MEDICINE

## 2023-04-27 NOTE — LETTER
4/27/2023    Tomasa Rios MD  [address]  [place] [facility] [address]    RE: Marilee Marks       Dear Colleague,     I had the pleasure of seeing Marilee Marks in the [facility].  CARDIOLOGY VISIT    REASON FOR VISIT: CAD    SUBJECTIVE:  77-year-old female seen for CAD.  She has hypertension, DVT, hypothyroid, sleep apnea.     2021 she had left bundle branch block on preop EKG.  Nuclear stress October 2021 showed nontransmural infarct of the anterior, apical, and anteroseptal segments, no ischemia, EF 60%.    Coronary CTA 2021 showed calcium score 424, 83rd percentile, moderate disease of OM1, severe disease of OM 3 (2 mm vessel), mild disease elsewhere.    She has been feeling well recently.  She will do water aerobics with no chest pain.  She is quite frustrated that she cannot lose more weight.  She eats a very healthy diet including fish, venison, and vegetables.  Blood pressure tends to run less than 130.    MEDICATIONS:  Current Outpatient Medications   Medication    buPROPion (WELLBUTRIN XL) 150 MG 24 hr tablet    CALTRATE 600+D 600-400 MG-UNIT PO TABS    celecoxib (CELEBREX) 200 MG capsule    CENTRUM SILVER PO TABS    hydrochlorothiazide (HYDRODIURIL) 12.5 MG tablet    levothyroxine (SYNTHROID/LEVOTHROID) 112 MCG tablet    topiramate (TOPAMAX) 50 MG tablet    VITAMIN D3 1000 UNIT PO CAPS     No current facility-administered medications for this visit.       ALLERGIES:  Allergies   Allergen Reactions    Lisinopril Cough    Codeine Itching       REVIEW OF SYSTEMS:  Constitutional:  No weight loss, fever, chills  HEENT:  Eyes:  No visual loss, blurred vision, double vision or yellow sclerae. No hearing loss, sneezing, congestion, runny nose or sore throat.  Skin:  No rash or itching.  Cardiovascular: per HPI  Respiratory: per HPI  GI:  No anorexia, nausea, vomiting or diarrhea. No abdominal pain or blood.  :  No dysurea, hematuria  Neurologic:  No headache, paralysis,  ataxia, numbness or tingling in the extremities. No change in bowel or bladder control.  Musculoskeletal:  No muscle pain  Hematologic:  No bleeding or bruising.  Lymphatics:  No enlarged nodes. No history of splenectomy.  Endocrine:  No reports of sweating, cold or heat intolerance. No polyuria or polydipsia.  Allergies:  No history of asthma, hives, eczema or rhinitis.    PHYSICAL EXAM:  /64 (BP Location: Right arm, Patient Position: Sitting, Cuff Size: Adult Regular)   Pulse 76   Wt 92.5 kg (204 lb)   LMP  (LMP Unknown)   SpO2 97%   BMI 35.02 kg/m      Constitutional: awake, alert, no distress  Eyes: PERRL, sclera nonicteric  ENT: trachea midline  Respiratory: Lungs clear  Cardiovascular: Regular rate and rhythm, no murmurs  GI: nondistended, nontender, bowel sounds present  Lymph/Hematologic: no lymphadenopathy  Skin: dry, no rash  Musculoskeletal: good muscle tone, strength 5/5 in upper and lower extremities  Neurologic: no focal deficits  Neuropsychiatric: appropriate affact    DATA:  Lab:   Recent Labs   Lab Test 05/12/22  0902 09/23/20  1537   CHOL 166 194   HDL 58 79   LDL 86 102*   TRIG 110 67     ASSESSMENT:  77-year-old female seen for CAD.  She is doing well with no concerning symptoms.  Blood pressure is at goal.  No need for further cardiac testing.    RECOMMENDATIONS:  1.  Coronary artery disease  -Continue current medications  -Encouraged ongoing exercise    Patient will follow-up with her primary physician, she can follow-up with cardiology if needed in the future.    Earnest Pierce MD  Cardiology - [facility]  Pager:  [phone]  Text Page  April 27, 2023          Thank you for allowing me to participate in the care of your patient.      Sincerely,     Earnest Pierce MD     [facility]  cc:   Earnest Pierce MD  [facility]  [address]  [place] [address]

## 2023-04-27 NOTE — NURSING NOTE
Chief Complaint   Patient presents with     Heart Problem     Annual Follow up LBBB       Vitals:    04/27/23 1355   Pulse: 76   SpO2: 97%   Weight: 92.5 kg (204 lb)     Wt Readings from Last 1 Encounters:   04/27/23 92.5 kg (204 lb)     [place] MA

## 2023-05-02 DIAGNOSIS — F32.1 MODERATE MAJOR DEPRESSION (H): ICD-10-CM

## 2023-05-02 DIAGNOSIS — F43.23 ADJUSTMENT DISORDER WITH MIXED ANXIETY AND DEPRESSED MOOD: ICD-10-CM

## 2023-05-02 NOTE — TELEPHONE ENCOUNTER
"Requested Prescriptions   Pending Prescriptions Disp Refills     buPROPion (WELLBUTRIN XL) 150 MG 24 hr tablet [Pharmacy Med Name: BUPROPION HCL XL TABS 150MG] 90 tablet 3     Sig: TAKE 1 TABLET EVERY MORNING       SSRIs Protocol Failed - 5/2/2023  3:03 PM        Failed - PHQ-9 score less than 5 in past 6 months     Please review last PHQ-9 score.           Passed - Medication is Bupropion     If the medication is Bupropion (Wellbutrin), and the patient is taking for smoking cessation; OK to refill.          Passed - Medication is active on med list        Passed - Patient is age 18 or older        Passed - No active pregnancy on record        Passed - No positive pregnancy test in last 12 months        Passed - Recent (6 mo) or future (30 days) visit within the authorizing provider's specialty     Patient had office visit in the last 6 months or has a visit in the next 30 days with authorizing provider or within the authorizing provider's specialty.  See \"Patient Info\" tab in inbasket, or \"Choose Columns\" in Meds & Orders section of the refill encounter.               Routing to provider.    Patient has Annual Wellness Visit Scheduled 5/15/23 with Dr. Rios.    Tawana Estevez RN on 5/2/2023 at 3:39 PM    "

## 2023-05-03 RX ORDER — BUPROPION HYDROCHLORIDE 150 MG/1
TABLET ORAL
Qty: 90 TABLET | Refills: 3 | Status: SHIPPED | OUTPATIENT
Start: 2023-05-03

## 2023-05-08 ASSESSMENT — ENCOUNTER SYMPTOMS
PARESTHESIAS: 0
WEAKNESS: 0
PALPITATIONS: 0
EYE PAIN: 0
FREQUENCY: 0
NERVOUS/ANXIOUS: 0
JOINT SWELLING: 0
ABDOMINAL PAIN: 0
HEADACHES: 0
NAUSEA: 0
ARTHRALGIAS: 1
BREAST MASS: 0
HEARTBURN: 0
MYALGIAS: 1
DIZZINESS: 0
DYSURIA: 0
CHILLS: 0
SORE THROAT: 0
FEVER: 0
SHORTNESS OF BREATH: 0
DIARRHEA: 0
CONSTIPATION: 0
HEMATOCHEZIA: 0
HEMATURIA: 0
COUGH: 0

## 2023-05-08 ASSESSMENT — ACTIVITIES OF DAILY LIVING (ADL): CURRENT_FUNCTION: NO ASSISTANCE NEEDED

## 2023-05-15 ENCOUNTER — OFFICE VISIT (OUTPATIENT)
Dept: FAMILY MEDICINE | Facility: CLINIC | Age: 78
End: 2023-05-15
Payer: COMMERCIAL

## 2023-05-15 ENCOUNTER — LAB (OUTPATIENT)
Dept: LAB | Facility: CLINIC | Age: 78
End: 2023-05-15
Payer: COMMERCIAL

## 2023-05-15 VITALS
HEART RATE: 61 BPM | HEIGHT: 64 IN | WEIGHT: 202.13 LBS | RESPIRATION RATE: 20 BRPM | BODY MASS INDEX: 34.51 KG/M2 | OXYGEN SATURATION: 97 % | DIASTOLIC BLOOD PRESSURE: 62 MMHG | SYSTOLIC BLOOD PRESSURE: 130 MMHG | TEMPERATURE: 98.3 F

## 2023-05-15 DIAGNOSIS — I10 BENIGN ESSENTIAL HYPERTENSION: ICD-10-CM

## 2023-05-15 DIAGNOSIS — E03.9 HYPOTHYROIDISM, UNSPECIFIED TYPE: ICD-10-CM

## 2023-05-15 DIAGNOSIS — M19.91 PRIMARY OSTEOARTHRITIS, UNSPECIFIED SITE: ICD-10-CM

## 2023-05-15 DIAGNOSIS — N18.31 STAGE 3A CHRONIC KIDNEY DISEASE (H): ICD-10-CM

## 2023-05-15 DIAGNOSIS — Z23 HIGH PRIORITY FOR 2019-NCOV VACCINE: ICD-10-CM

## 2023-05-15 DIAGNOSIS — I25.10 CORONARY ARTERY DISEASE INVOLVING NATIVE CORONARY ARTERY OF NATIVE HEART WITHOUT ANGINA PECTORIS: ICD-10-CM

## 2023-05-15 DIAGNOSIS — Z00.00 ENCOUNTER FOR MEDICARE ANNUAL WELLNESS EXAM: Primary | ICD-10-CM

## 2023-05-15 DIAGNOSIS — E83.52 HYPERCALCEMIA: ICD-10-CM

## 2023-05-15 DIAGNOSIS — F32.1 MODERATE MAJOR DEPRESSION (H): ICD-10-CM

## 2023-05-15 LAB
ALBUMIN UR-MCNC: NEGATIVE MG/DL
ANION GAP SERPL CALCULATED.3IONS-SCNC: 9 MMOL/L (ref 7–15)
APPEARANCE UR: CLEAR
BACTERIA #/AREA URNS HPF: ABNORMAL /HPF
BILIRUB UR QL STRIP: NEGATIVE
BUN SERPL-MCNC: 24.4 MG/DL (ref 8–23)
CALCIUM SERPL-MCNC: 10.6 MG/DL (ref 8.8–10.2)
CHLORIDE SERPL-SCNC: 107 MMOL/L (ref 98–107)
CHOLEST SERPL-MCNC: 211 MG/DL
COLOR UR AUTO: YELLOW
CREAT SERPL-MCNC: 0.93 MG/DL (ref 0.51–0.95)
CREAT UR-MCNC: 65.7 MG/DL
DEPRECATED HCO3 PLAS-SCNC: 23 MMOL/L (ref 22–29)
GFR SERPL CREATININE-BSD FRML MDRD: 63 ML/MIN/1.73M2
GLUCOSE SERPL-MCNC: 92 MG/DL (ref 70–99)
GLUCOSE UR STRIP-MCNC: NEGATIVE MG/DL
HDLC SERPL-MCNC: 71 MG/DL
HGB BLD-MCNC: 12.3 G/DL (ref 11.7–15.7)
HGB UR QL STRIP: NEGATIVE
KETONES UR STRIP-MCNC: NEGATIVE MG/DL
LDLC SERPL CALC-MCNC: 125 MG/DL
LEUKOCYTE ESTERASE UR QL STRIP: ABNORMAL
MICROALBUMIN UR-MCNC: <12 MG/L
MICROALBUMIN/CREAT UR: NORMAL MG/G{CREAT}
NITRATE UR QL: NEGATIVE
NONHDLC SERPL-MCNC: 140 MG/DL
PH UR STRIP: 7.5 [PH] (ref 5–7)
POTASSIUM SERPL-SCNC: 5 MMOL/L (ref 3.4–5.3)
RBC #/AREA URNS AUTO: ABNORMAL /HPF
SODIUM SERPL-SCNC: 139 MMOL/L (ref 136–145)
SP GR UR STRIP: 1.02 (ref 1–1.03)
SQUAMOUS #/AREA URNS AUTO: ABNORMAL /LPF
TRIGL SERPL-MCNC: 76 MG/DL
TSH SERPL DL<=0.005 MIU/L-ACNC: 0.63 UIU/ML (ref 0.3–4.2)
UROBILINOGEN UR STRIP-ACNC: 0.2 E.U./DL
WBC #/AREA URNS AUTO: ABNORMAL /HPF

## 2023-05-15 PROCEDURE — 81001 URINALYSIS AUTO W/SCOPE: CPT

## 2023-05-15 PROCEDURE — 91312 COVID-19 BIVALENT 12+ (PFIZER): CPT | Performed by: FAMILY MEDICINE

## 2023-05-15 PROCEDURE — 99214 OFFICE O/P EST MOD 30 MIN: CPT | Mod: 25 | Performed by: FAMILY MEDICINE

## 2023-05-15 PROCEDURE — 0124A COVID-19 BIVALENT 12+ (PFIZER): CPT | Performed by: FAMILY MEDICINE

## 2023-05-15 PROCEDURE — 36415 COLL VENOUS BLD VENIPUNCTURE: CPT

## 2023-05-15 PROCEDURE — 80061 LIPID PANEL: CPT

## 2023-05-15 PROCEDURE — 80048 BASIC METABOLIC PNL TOTAL CA: CPT

## 2023-05-15 PROCEDURE — 84443 ASSAY THYROID STIM HORMONE: CPT

## 2023-05-15 PROCEDURE — G0439 PPPS, SUBSEQ VISIT: HCPCS | Performed by: FAMILY MEDICINE

## 2023-05-15 PROCEDURE — 85018 HEMOGLOBIN: CPT

## 2023-05-15 PROCEDURE — 82570 ASSAY OF URINE CREATININE: CPT

## 2023-05-15 PROCEDURE — 82043 UR ALBUMIN QUANTITATIVE: CPT

## 2023-05-15 RX ORDER — HYDROCHLOROTHIAZIDE 12.5 MG/1
12.5 TABLET ORAL DAILY
Qty: 90 TABLET | Refills: 3 | Status: SHIPPED | OUTPATIENT
Start: 2023-05-15 | End: 2024-03-14

## 2023-05-15 RX ORDER — TOPIRAMATE 50 MG/1
50 TABLET, FILM COATED ORAL 2 TIMES DAILY
Qty: 180 TABLET | Refills: 3 | Status: SHIPPED | OUTPATIENT
Start: 2023-05-15

## 2023-05-15 RX ORDER — LEVOTHYROXINE SODIUM 112 UG/1
112 TABLET ORAL DAILY
Qty: 90 TABLET | Refills: 3 | Status: SHIPPED | OUTPATIENT
Start: 2023-05-15

## 2023-05-15 RX ORDER — PHENTERMINE HYDROCHLORIDE 15 MG/1
15 CAPSULE ORAL EVERY MORNING
Qty: 30 CAPSULE | Refills: 0 | Status: SHIPPED | OUTPATIENT
Start: 2023-05-15 | End: 2023-05-17

## 2023-05-15 RX ORDER — CELECOXIB 200 MG/1
200 CAPSULE ORAL DAILY
Qty: 90 CAPSULE | Refills: 3 | Status: SHIPPED | OUTPATIENT
Start: 2023-05-15

## 2023-05-15 ASSESSMENT — ENCOUNTER SYMPTOMS
SHORTNESS OF BREATH: 0
COUGH: 0
SORE THROAT: 0
HEARTBURN: 0
WEAKNESS: 0
FREQUENCY: 0
HEADACHES: 0
DIARRHEA: 0
NAUSEA: 0
JOINT SWELLING: 0
PARESTHESIAS: 0
BREAST MASS: 0
DYSURIA: 0
CONSTIPATION: 0
HEMATURIA: 0
FEVER: 0
MYALGIAS: 1
EYE PAIN: 0
CHILLS: 0
DIZZINESS: 0
HEMATOCHEZIA: 0
ABDOMINAL PAIN: 0
NERVOUS/ANXIOUS: 0
PALPITATIONS: 0
ARTHRALGIAS: 1

## 2023-05-15 ASSESSMENT — ACTIVITIES OF DAILY LIVING (ADL): CURRENT_FUNCTION: NO ASSISTANCE NEEDED

## 2023-05-15 ASSESSMENT — PAIN SCALES - GENERAL: PAINLEVEL: NO PAIN (0)

## 2023-05-15 ASSESSMENT — PATIENT HEALTH QUESTIONNAIRE - PHQ9
SUM OF ALL RESPONSES TO PHQ QUESTIONS 1-9: 2
SUM OF ALL RESPONSES TO PHQ QUESTIONS 1-9: 2
10. IF YOU CHECKED OFF ANY PROBLEMS, HOW DIFFICULT HAVE THESE PROBLEMS MADE IT FOR YOU TO DO YOUR WORK, TAKE CARE OF THINGS AT HOME, OR GET ALONG WITH OTHER PEOPLE: NOT DIFFICULT AT ALL

## 2023-05-15 NOTE — PROGRESS NOTES
"SUBJECTIVE:   Marilee is a 77 year old who presents for Preventive Visit.  Patient has been advised of split billing requirements and indicates understanding: Yes  Are you in the first 12 months of your Medicare coverage?  No    Healthy Habits:     In general, how would you rate your overall health?  Good    Frequency of exercise:  4-5 days/week    Duration of exercise:  30-45 minutes    Do you usually eat at least 4 servings of fruit and vegetables a day, include whole grains    & fiber and avoid regularly eating high fat or \"junk\" foods?  Yes    Taking medications regularly:  Yes    Medication side effects:  None    Ability to successfully perform activities of daily living:  No assistance needed    Home Safety:  No safety concerns identified    Hearing Impairment:  No hearing concerns    In the past 6 months, have you been bothered by leaking of urine?  No    In general, how would you rate your overall mental or emotional health?  Good      PHQ-2 Total Score: 0    Additional concerns today:  No       Have you ever done Advance Care Planning? (For example, a Health Directive, POLST, or a discussion with a medical provider or your loved ones about your wishes): Yes, advance care planning is on file.       Fall risk  Fallen 2 or more times in the past year?: Yes  Any fall with injury in the past year?: No    Cognitive Screening : patient declined    Do you have sleep apnea, excessive snoring or daytime drowsiness?: no    Reviewed and updated as needed this visit by clinical staff   Tobacco  Allergies  Meds              Reviewed and updated as needed this visit by Provider                 Social History     Tobacco Use     Smoking status: Never     Smokeless tobacco: Never   Vaping Use     Vaping status: Never Used   Substance Use Topics     Alcohol use: Not Currently             5/8/2023    11:31 AM   Alcohol Use   Prescreen: >3 drinks/day or >7 drinks/week? No     Do you have a current opioid prescription? No  Do " you use any other controlled substances or medications that are not prescribed by a provider? None        Hypertension Follow-up  Hydrochlorothiazide 12.5mg qd    Do you check your blood pressure regularly outside of the clinic? Yes     Are you following a low salt diet? Yes    Are your blood pressures ever more than 140 on the top number (systolic) OR more   than 90 on the bottom number (diastolic), for example 140/90? No    BP Readings from Last 6 Encounters:   05/15/23 130/62   04/27/23 130/64   05/12/22 130/60   05/09/22 (!) 158/72   02/15/22 (!) 173/74   02/02/22 (!) 167/82       Depression and Anxiety Follow-Up  Wellbutrin XL 150mg qd    How are you doing with your depression since your last visit? Worsened     How are you doing with your anxiety since your last visit?  Worsened     Are you having other symptoms that might be associated with depression or anxiety? Yes:  weight gain and increase in appetite    Have you had a significant life event? No     Do you have any concerns with your use of alcohol or other drugs? No    Social History     Tobacco Use     Smoking status: Never     Smokeless tobacco: Never   Vaping Use     Vaping status: Never Used   Substance Use Topics     Alcohol use: Not Currently     Drug use: No         8/31/2021    11:51 AM 5/4/2022    10:47 AM 5/15/2023     9:55 AM   PHQ   PHQ-9 Total Score 0 0 2   Q9: Thoughts of better off dead/self-harm past 2 weeks Not at all Not at all Not at all         8/31/2015     6:19 PM 4/13/2021    11:03 AM 5/4/2022    10:48 AM   GENNY-7 SCORE   Total Score   0 (minimal anxiety)   Total Score 0 14 0         Suicide Assessment Five-step Evaluation and Treatment (SAFE-T)    Migraine     Since your last clinic visit, how have your headaches changed?  No change    How often are you getting headaches or migraines? rarely     Are you able to do normal daily activities when you have a migraine? Yes    Are you taking rescue/relief medications? (Select all that  apply) No    How helpful is your rescue/relief medication?  N/A    Are you taking any medications to prevent migraines? (Select all that apply)  Topamax    In the past 4 weeks, how often have you gone to urgent care or the emergency room because of your headaches?  0    Hypothyroidism Follow-up  Levothyroxine 112mcg qd    Since last visit, patient describes the following symptoms: weight gain of 20 lbs, constipation, anxiety and depression    TSH   Date Value Ref Range Status   05/12/2022 0.03 (L) 0.40 - 4.00 mU/L Final   01/28/2021 3.01 0.40 - 4.00 mU/L Final     T4 Free   Date Value Ref Range Status   09/23/2020 1.11 0.76 - 1.46 ng/dL Final     Free T4   Date Value Ref Range Status   05/12/2022 1.24 0.76 - 1.46 ng/dL Final       Current providers sharing in care for this patient include:   Patient Care Team:  Tomasa Rios MD as PCP - General (Family Practice)  Luzma Finn MD as MD (Internal Medicine)  Tomasa Rios MD as Referring Physician (Family Practice)  FELIX Madrigal MD as Assigned Surgical Provider  Tomasa Rios MD as Assigned PCP  Earnest Pierce MD as Assigned Heart and Vascular Provider    The following health maintenance items are reviewed in Epic and correct as of today:  Health Maintenance   Topic Date Due     URINALYSIS  Never done     COVID-19 Vaccine (6 - Pfizer series) 01/15/2023     ANNUAL REVIEW OF HM ORDERS  05/09/2023     BMP  05/12/2023     LIPID  05/12/2023     MICROALBUMIN  05/12/2023     TSH W/FREE T4 REFLEX  05/12/2023     HEMOGLOBIN  05/12/2023     DTAP/TDAP/TD IMMUNIZATION (2 - Td or Tdap) 10/14/2023     PHQ-9  11/15/2023     MEDICARE ANNUAL WELLNESS VISIT  05/15/2024     FALL RISK ASSESSMENT  05/15/2024     ADVANCE CARE PLANNING  05/12/2027     DEXA  07/13/2030     COLORECTAL CANCER SCREENING  07/06/2032     HEPATITIS C SCREENING  Completed     DEPRESSION ACTION PLAN  Completed     INFLUENZA VACCINE  Completed     Pneumococcal Vaccine: 65+  "Years  Completed     ZOSTER IMMUNIZATION  Completed     IPV IMMUNIZATION  Aged Out     MENINGITIS IMMUNIZATION  Aged Out     MAMMO SCREENING  Discontinued     Lab work is in process  Labs reviewed in EPIC      Mammogram Screening - Patient over age 75, has elected to discontinue screenings.  Pertinent mammograms are reviewed under the imaging tab.    Review of Systems   Constitutional: Negative for chills and fever.   HENT: Negative for congestion, ear pain, hearing loss and sore throat.    Eyes: Negative for pain and visual disturbance.   Respiratory: Negative for cough and shortness of breath.    Cardiovascular: Negative for chest pain, palpitations and peripheral edema.   Gastrointestinal: Negative for abdominal pain, constipation, diarrhea, heartburn, hematochezia and nausea.   Breasts:  Negative for tenderness, breast mass and discharge.   Genitourinary: Negative for dysuria, frequency, genital sores, hematuria, pelvic pain, urgency, vaginal bleeding and vaginal discharge.   Musculoskeletal: Positive for arthralgias and myalgias. Negative for joint swelling.   Skin: Negative for rash.   Neurological: Negative for dizziness, weakness, headaches and paresthesias.   Psychiatric/Behavioral: Negative for mood changes. The patient is not nervous/anxious.          OBJECTIVE:   /62   Pulse 61   Temp 98.3  F (36.8  C) (Tympanic)   Resp 20   Ht 1.626 m (5' 4\")   Wt 91.7 kg (202 lb 2 oz)   LMP  (LMP Unknown)   SpO2 97%   BMI 34.69 kg/m   Estimated body mass index is 34.69 kg/m  as calculated from the following:    Height as of this encounter: 1.626 m (5' 4\").    Weight as of this encounter: 91.7 kg (202 lb 2 oz).  Physical Exam  GENERAL: healthy, alert and no distress  NECK: no adenopathy, no asymmetry, masses, or scars and thyroid normal to palpation  RESP: lungs clear to auscultation - no rales, rhonchi or wheezes  CV: regular rate and rhythm, normal S1 S2, no S3 or S4, no murmur, click or rub, no " peripheral edema and peripheral pulses strong  ABDOMEN: soft, nontender, no hepatosplenomegaly, no masses and bowel sounds normal  MS: no gross musculoskeletal defects noted, no edema    Diagnostic Test Results:  Labs reviewed in Epic    ASSESSMENT / PLAN:   (Z00.00) Encounter for Medicare annual wellness exam  (primary encounter diagnosis)  Comment:    Plan:      (I10) Benign essential hypertension  Comment:  Well controlled  Plan: BASIC METABOLIC PANEL, Albumin Random Urine         Quantitative with Creat Ratio,         hydrochlorothiazide (HYDRODIURIL) 12.5 MG         tablet             (I25.10) Coronary artery disease involving native coronary artery of native heart without angina pectoris  Comment:  No longer needs to see cardiology  Plan: Hemoglobin, CANCELED: Lipid panel reflex to         direct LDL Non-fasting             (E03.9) Hypothyroidism, unspecified type  Comment:  Clinically euthyroid but having some weight gain  Plan: TSH WITH FREE T4 REFLEX, levothyroxine         (SYNTHROID/LEVOTHROID) 112 MCG tablet,         OFFICE/OUTPT VISIT,EST,LEVL III             (N18.31) Stage 3a chronic kidney disease (H)  Comment:  Monitor regularly  Plan: UA Macroscopic with reflex to Microscopic and         Culture, Albumin Random Urine Quantitative with        Creat Ratio, Hemoglobin, Lipid panel reflex to         direct LDL Fasting             (F32.1) Moderate major depression (H)  Comment:  stable  Plan:  Continue Wellbutrin     (Z68.35) BMI 35.0-35.9,adult  Comment:  Start phentermine  Recheck in 1 month- will schedule today  Risks, benefits and alternatives discussed    Plan: topiramate (TOPAMAX) 50 MG tablet, OFFICE/OUTPT        VISIT,EST,LEVL III, phentermine (ADIPEX-P) 15         MG capsule             (M19.91) Primary osteoarthritis, unspecified site  Comment:    Plan: celecoxib (CELEBREX) 200 MG capsule               Patient has been advised of split billing requirements and indicates understanding:  Yes      COUNSELING:  Reviewed preventive health counseling, as reflected in patient instructions       Regular exercise       Healthy diet/nutrition        She reports that she has never smoked. She has never used smokeless tobacco.      Appropriate preventive services were discussed with this patient, including applicable screening as appropriate for cardiovascular disease, diabetes, osteopenia/osteoporosis, and glaucoma.  As appropriate for age/gender, discussed screening for colorectal cancer, prostate cancer, breast cancer, and cervical cancer. Checklist reviewing preventive services available has been given to the patient.    Reviewed patients plan of care and provided an AVS. The Basic Care Plan (routine screening as documented in Health Maintenance) for Marilee meets the Care Plan requirement. This Care Plan has been established and reviewed with the Patient.      Tomasa Rios MD  [facility]    Identified Health Risks:

## 2023-05-15 NOTE — RESULT ENCOUNTER NOTE
Marilee,    These labs are normal or acceptable.    Please contact my office if you have questions.    Tomasa Rios M.D.

## 2023-05-15 NOTE — PATIENT INSTRUCTIONS
Patient Education   Personalized Prevention Plan  You are due for the preventive services outlined below.  Your care team is available to assist you in scheduling these services.  If you have already completed any of these items, please share that information with your care team to update in your medical record.  Health Maintenance Due   Topic Date Due     Urine Test  Never done     Depression Assessment  11/04/2022     COVID-19 Vaccine (6 - Pfizer series) 01/15/2023     ANNUAL REVIEW OF HM ORDERS  05/09/2023     Basic Metabolic Panel  05/12/2023     Cholesterol Lab  05/12/2023     Kidney Microalbumin Urine Test  05/12/2023     FALL RISK ASSESSMENT  05/12/2023     Thyroid Function Lab  05/12/2023     Hemoglobin  05/12/2023

## 2023-05-16 ENCOUNTER — MYC MEDICAL ADVICE (OUTPATIENT)
Dept: FAMILY MEDICINE | Facility: CLINIC | Age: 78
End: 2023-05-16
Payer: COMMERCIAL

## 2023-05-17 RX ORDER — PHENTERMINE HYDROCHLORIDE 15 MG/1
15 CAPSULE ORAL EVERY MORNING
Qty: 30 CAPSULE | Refills: 0 | Status: SHIPPED | OUTPATIENT
Start: 2023-05-17 | End: 2023-06-08

## 2023-06-08 ENCOUNTER — TELEPHONE (OUTPATIENT)
Dept: FAMILY MEDICINE | Facility: CLINIC | Age: 78
End: 2023-06-08

## 2023-06-08 ENCOUNTER — ALLIED HEALTH/NURSE VISIT (OUTPATIENT)
Dept: FAMILY MEDICINE | Facility: CLINIC | Age: 78
End: 2023-06-08
Payer: COMMERCIAL

## 2023-06-08 VITALS
DIASTOLIC BLOOD PRESSURE: 69 MMHG | BODY MASS INDEX: 33.81 KG/M2 | SYSTOLIC BLOOD PRESSURE: 135 MMHG | HEART RATE: 70 BPM | WEIGHT: 197 LBS

## 2023-06-08 DIAGNOSIS — Z01.30 BLOOD PRESSURE CHECK: Primary | ICD-10-CM

## 2023-06-08 PROCEDURE — 99207 PR NO CHARGE NURSE ONLY: CPT

## 2023-06-08 RX ORDER — PHENTERMINE HYDROCHLORIDE 15 MG/1
15 CAPSULE ORAL EVERY MORNING
Qty: 30 CAPSULE | Refills: 5 | Status: ON HOLD | OUTPATIENT
Start: 2023-06-08 | End: 2024-08-28

## 2023-06-08 NOTE — TELEPHONE ENCOUNTER
Marilee presents to clinic for a weight check and blood pressure check. Today's weight is 197# and BP first reading is 148/67 with pulse of 71 in the right arm and second reading is 135/69 with pulse of 70 in the left arm. Home readings have been in the 120's/70's. She is having this checked so Dr Rios will refill her Phentermine before Dr Rios goes on vacation next week. Thank you!    Amanda Carmona RN

## 2023-06-08 NOTE — TELEPHONE ENCOUNTER
Wt Readings from Last 5 Encounters:   06/08/23 89.4 kg (197 lb)   05/15/23 91.7 kg (202 lb 2 oz)   04/27/23 92.5 kg (204 lb)   05/12/22 86.2 kg (190 lb)   05/09/22 88.9 kg (196 lb)     BP Readings from Last 6 Encounters:   06/08/23 135/69   05/15/23 130/62   04/27/23 130/64   05/12/22 130/60   05/09/22 (!) 158/72   02/15/22 (!) 173/74         Refill sent.    Tomasa Rios M.D.

## 2023-11-30 NOTE — TELEPHONE ENCOUNTER
Contacted Jessie arrington nurse  She states she has not recevied any IV ATB or weekly lab orders for this pt  I asked if the [address] had been received and I was told yes    From CC note dated 11/29/23  Signed         Discharge Plan:      Patient discharged to:     [facility]  [address]. [place], [address] [facility] [address]     SW/DC Planner faxed, [address] and AVS to: [phone]     Narcotic Prescriptions faxed were: N/A     RN: will call report to:  [phone] with: [facility] [phone]      time: 1630     Family advised of discharge?: yes, Joanna Paige, daughter     LEONIDAS Submitted?:  N/A     All discharge needs met per case management.      HIGINIO VivasN RN    [facility]  Phone: [phone]             Nurse asked me to refax the orders to the attn or the Jessie arrington nurse  I faxed the shavon and written orders to [phone] - confirmation scanned to chart    I also called to speak with MAY and was told she was still in her daily meeting  I will reach out to her again later Looks like the lab was ordered by Dr. Whitehead on 3/26- it's possible that it was ordered by the nurse who was refilling hydrochlorothiazide at that time.  It's unclear.      We can plan on physical/blood work in 3-4 months, should be fasting and we'll repeat at that time.  No need to come in now for additional testing.    Tomasa Rios M.D.

## 2024-02-15 ENCOUNTER — TRANSFERRED RECORDS (OUTPATIENT)
Dept: HEALTH INFORMATION MANAGEMENT | Facility: CLINIC | Age: 79
End: 2024-02-15
Payer: COMMERCIAL

## 2024-02-15 NOTE — PROGRESS NOTES
ALL SMARTFIELDS MUST BE COMPLETED FOR PATIENT CARE AND BILLING    10/20/2021     E-Consult has been accepted.    Interprofessional consultation requested by:  Tomasa Rios MD      Clinical Question/Purpose: MY CLINICAL QUESTION IS: 76 yo female with LBBB on EKG for preoperative evaluation with represented a change from 11/2019.  NM stress testing shows transmural infarct with no obi-infarct ischemia or reversibility.  Scheduled 10/22 for breast reduction mammoplasty.  Patient is completely asymptomatic, EF is 60% and no WM abnormalities on the scan.  Okay to proceed with low risk surgery and follow up with cardiology after surgery?    Patient assessment and information reviewed:    Recommendations:     The nuclear stress is abnormal and I think a definitive evaluation for CAD would be appropriate. You could order a coronary CTA which if normal would not require any further cardiac testing. Otherwise  cardiac consultation would also be reasonable.     The recommendations provided in this E-Consult are based on the clinical data available to me at this time, and are furnished without the benefit of a comprehensive in-person or virtual patient evaluation, Any new clinical issues or changes in patient status since the filing of this E-Consult will need to be taken into account when assessing these recommendations. Please contact me if you have further questions.    My total time spent reviewing clinical information and formulating assessment was 5 minutes.    Report sent automatically to requesting provider once signed.     Charge codes - [id number] (5+ minutes) or [id number] (No charge code)    Prateek Hernandez MD   Medication Patient Assistance Program (MPAP) Specialist  received teams message stating patient was in office to see MPAP specialist. MPAP specialist met with patient regarding patient assistance programs for Ozempic through [employer] and Lantus through [employer] Patient assistance program. Patient stated he was unable to find his applications. MPAP specialist printed 2 copies of each application. Patient's significant other asked for MPAP specialist's card. MPAP specialist provided her card. MPAP specialist encouraged patient to reach out with any questions regarding the patient assistance program applications. Patient stated he will. MPAP specialist explained that patient may be required to provide proof of his income. Patient verbalized understanding. MPAP specialist thanked patient . MPAP specialist will follow up with patient in 2 weeks regarding application status. MPAP specialist has set a personal reminder.

## 2024-03-14 RX ORDER — AMLODIPINE BESYLATE 10 MG/1
1 TABLET ORAL DAILY
COMMUNITY
Start: 2024-03-08

## 2024-03-14 RX ORDER — MULTIVITAMIN WITH IRON
1 TABLET ORAL DAILY
COMMUNITY

## 2024-03-14 RX ORDER — ROSUVASTATIN CALCIUM 20 MG/1
1 TABLET, COATED ORAL AT BEDTIME
COMMUNITY
Start: 2024-02-02

## 2024-03-14 RX ORDER — ALENDRONATE SODIUM 70 MG/1
70 TABLET ORAL
COMMUNITY
Start: 2024-02-02

## 2024-03-19 ENCOUNTER — ANESTHESIA EVENT (OUTPATIENT)
Dept: SURGERY | Facility: CLINIC | Age: 79
End: 2024-03-19
Payer: COMMERCIAL

## 2024-03-19 ASSESSMENT — ENCOUNTER SYMPTOMS: DYSRHYTHMIAS: 1

## 2024-03-19 NOTE — ANESTHESIA PREPROCEDURE EVALUATION
Anesthesia Pre-Procedure Evaluation    Patient: Marilee Marks   MRN: [id number] : 1945        Procedure : Procedure(s):  Reverse total shoulder arthroplasty          Past Medical History:   Diagnosis Date    Benign essential hypertension     Depressive disorder     HTN, goal below 140/90     Hyperlipidemia     Hypothyroidism     Hypothyroidism, unspecified type     LBBB (left bundle branch block) 2021    Right knee DJD     Spigelian hernia 2011      Past Surgical History:   Procedure Laterality Date    ARTHROPLASTY KNEE Right 10/30/2014    Procedure: ARTHROPLASTY KNEE;  Surgeon: Fabian Deras MD;  Location: [facility] OR    ARTHROSCOPY KNEE Right 2015    Procedure: ARTHROSCOPY KNEE;  Surgeon: Fabian Deras MD;  Location: [facility]    BREAST LUMPECTOMY, RT/LT      LT non-ca    C/SECTION, CLASSICAL  1973    COLONOSCOPY  2007    repeat in 10 years    COSMETIC SURGERY      IR MISCELLANEOUS PROCEDURE  2009    LAPAROSCOPIC HERNIORRHAPHY VENTRAL  2011    Procedure:LAPAROSCOPIC HERNIORRHAPHY VENTRAL; Laparoscopic Left Spigelian Herniorraphy With Mesh & Repair of Left Lower Quadrant Hernia With  Mesh; Surgeon:TODD PHAN; Location:[facility] OR    MAMMOPLASTY REDUCTION BILATERAL Bilateral 2022    Procedure: MAMMOPLASTY, REDUCTION, BILATERAL;  Surgeon: FELIX Madrigal MD;  [place] [facility] [place]    SURGICAL HISTORY OF -   2009    Left Heart Cath, Left Ventriculography, Coronary Angiogram, Closure [place], [facility] [facility]    TUBAL LIGATION      ZZHC REVISION GASTROPLASTY,OBESITY, NON-OBDULIA RESTRICT DEVICE  1979      Allergies   Allergen Reactions    Codeine Itching    Lisinopril Cough    Morphine And Related Itching      Social History     Tobacco Use    Smoking status: Never    Smokeless tobacco: Never   Substance Use Topics    Alcohol use: Not Currently      Wt Readings from Last 1 Encounters:   23 89.4 kg (197 lb)        Anesthesia Evaluation   Pt has  had prior anesthetic. Type: General, MAC and Regional.        ROS/MED HX  ENT/Pulmonary:     (+) sleep apnea,                                       Neurologic:       Cardiovascular:     (+) Dyslipidemia hypertension- -   -  - -                        dysrhythmias (LBBB), Other,  valvular problems/murmurs type: MR     Previous cardiac testing   Echo: Date: 11/29/21 Results:  Interpretation Summary     Left ventricular systolic function is normal. The visual ejection fraction is  60-65%. No regional wall motion abnormalities noted.  The right ventricular systolic function is normal.  Normal left atrial size. Right atrial size is normal.  Mild mitral and mild-moderate tricuspid regurgitaiton.  No prior study.  ______________________________________________________________________________  Left Ventricle  The left ventricle is normal in size. There is normal left ventricular wall  thickness. Left ventricular systolic function is normal. The visual ejection  fraction is 60-65%. Grade I or early diastolic dysfunction. Septal motion is  consistent with conduction abnormality.     Right Ventricle  The right ventricle is normal in size and function.     Atria  Normal left atrial size. Right atrial size is normal. There is no color  Doppler evidence of an atrial shunt.     Mitral Valve  The mitral valve leaflets appear normal. There is no evidence of stenosis,  fluttering, or prolapse. There is mild (1+) mitral regurgitation.     Tricuspid Valve  Normal tricuspid valve. There is mild to moderate (1-2+) tricuspid  regurgitation. The right ventricular systolic pressure is approximated at 27.3  mmHg plus the right atrial pressure.     Aortic Valve  The aortic valve is trileaflet. There is mild (1+) aortic regurgitation. No  aortic stenosis is present.     Pulmonic Valve  The pulmonic valve is not well visualized.     Vessels  The aortic root is normal size. Normal size ascending aorta. IVC diameter and  respiratory changes fall  "into an intermediate range suggesting an RA pressure  of 8 mmHg.     Pericardium  There is no pericardial effusion.     Rhythm  The rhythm was sinus with wide QRS.    Stress Test:  Date: Results:    ECG Reviewed:  Date: 10/6/21 Results:  Sinus  Rhythm   -Left bundle branch block.     ABNORMAL   Cath:  Date: Results:      METS/Exercise Tolerance:     Hematologic:     (+) History of blood clots,               Musculoskeletal: Comment: DJD    (+)  arthritis,             GI/Hepatic:       Renal/Genitourinary:     (+) renal disease, type: CRI,            Endo:     (+)          thyroid problem, hypothyroidism,    Obesity,       Psychiatric/Substance Use:     (+) psychiatric history depression       Infectious Disease:       Malignancy:       Other:            Physical Exam    Airway  airway exam normal           Respiratory Devices and Support         Dental       (+) Modest Abnormalities - crowns, retainers, 1 or 2 missing teeth      Cardiovascular   cardiovascular exam normal          Pulmonary   pulmonary exam normal                OUTSIDE LABS:  CBC:   Lab Results   Component Value Date    WBC 4.8 11/26/2019    WBC 4.0 11/08/2019    HGB 12.3 05/15/2023    HGB 12.1 05/12/2022    HCT 40.0 11/26/2019    HCT 38.6 11/08/2019     11/26/2019     11/08/2019     BMP:   Lab Results   Component Value Date     05/15/2023     05/12/2022    POTASSIUM 5.0 05/15/2023    POTASSIUM 4.1 05/12/2022    CHLORIDE 107 05/15/2023    CHLORIDE 111 (H) 05/12/2022    CO2 23 05/15/2023    CO2 26 05/12/2022    BUN 24.4 (H) 05/15/2023    BUN 20 05/12/2022    CR 0.93 05/15/2023    CR 0.85 05/12/2022    GLC 92 05/15/2023    GLC 89 05/12/2022     COAGS: No results found for: \"PTT\", \"INR\", \"FIBR\"  POC: No results found for: \"BGM\", \"HCG\", \"HCGS\"  HEPATIC:   Lab Results   Component Value Date    ALBUMIN 3.7 10/04/2019    PROTTOTAL 6.7 (L) 10/04/2019    ALT 32 11/08/2019    AST 17 11/08/2019    ALKPHOS 108 10/04/2019    BILITOTAL " 0.6 10/04/2019     OTHER:   Lab Results   Component Value Date    OPAL 10.6 (H) 05/15/2023    PHOS 3.0 06/29/2015    MAG 2.3 10/04/2019    TSH 0.63 05/15/2023    T4 1.24 05/12/2022    CRP <2.9 09/23/2020    SED 17 06/29/2015       Anesthesia Plan    ASA Status:  3    NPO Status:  NPO Appropriate    Anesthesia Type: General.      Maintenance: Balanced.        Consents    Anesthesia Plan(s) and associated risks, benefits, and realistic alternatives discussed. Questions answered and patient/representative(s) expressed understanding.     - Discussed:     - Discussed with:  Patient, Spouse            Postoperative Care            Comments:               ALANIS Charlton CRNA    I have reviewed the pertinent notes and labs in the chart from the past 30 days and (re)examined the patient.  Any updates or changes from those notes are reflected in this note.

## 2024-03-21 ENCOUNTER — HOSPITAL ENCOUNTER (OUTPATIENT)
Facility: CLINIC | Age: 79
Discharge: HOME | End: 2024-03-22
Attending: ORTHOPAEDIC SURGERY | Admitting: ORTHOPAEDIC SURGERY
Payer: COMMERCIAL

## 2024-03-21 ENCOUNTER — ANESTHESIA (OUTPATIENT)
Dept: SURGERY | Facility: CLINIC | Age: 79
End: 2024-03-21
Payer: COMMERCIAL

## 2024-03-21 ENCOUNTER — ANCILLARY PROCEDURE (OUTPATIENT)
Dept: ULTRASOUND IMAGING | Facility: CLINIC | Age: 79
End: 2024-03-21
Attending: NURSE ANESTHETIST, CERTIFIED REGISTERED
Payer: COMMERCIAL

## 2024-03-21 ENCOUNTER — APPOINTMENT (OUTPATIENT)
Dept: GENERAL RADIOLOGY | Facility: CLINIC | Age: 79
End: 2024-03-21
Attending: ORTHOPAEDIC SURGERY
Payer: COMMERCIAL

## 2024-03-21 DIAGNOSIS — Z96.611 HISTORY OF REVERSE TOTAL REPLACEMENT OF RIGHT SHOULDER JOINT: Primary | ICD-10-CM

## 2024-03-21 PROBLEM — M12.811 ROTATOR CUFF TEAR ARTHROPATHY OF RIGHT SHOULDER: Status: ACTIVE | Noted: 2024-03-21

## 2024-03-21 PROBLEM — M75.101 ROTATOR CUFF TEAR ARTHROPATHY OF RIGHT SHOULDER: Status: ACTIVE | Noted: 2024-03-21

## 2024-03-21 LAB
CREAT SERPL-MCNC: 0.87 MG/DL (ref 0.51–0.95)
EGFRCR SERPLBLD CKD-EPI 2021: 68 ML/MIN/1.73M2
ERYTHROCYTE [DISTWIDTH] IN BLOOD BY AUTOMATED COUNT: 13.3 % (ref 10–15)
GLUCOSE BLDC GLUCOMTR-MCNC: 80 MG/DL (ref 70–99)
HCT VFR BLD AUTO: 36.1 % (ref 35–47)
HGB BLD-MCNC: 11.2 G/DL (ref 11.7–15.7)
MCH RBC QN AUTO: 29 PG (ref 26.5–33)
MCHC RBC AUTO-ENTMCNC: 31 G/DL (ref 31.5–36.5)
MCV RBC AUTO: 94 FL (ref 78–100)
PLATELET # BLD AUTO: 238 10E3/UL (ref 150–450)
POTASSIUM SERPL-SCNC: 3.8 MMOL/L (ref 3.4–5.3)
RBC # BLD AUTO: 3.86 10E6/UL (ref 3.8–5.2)
WBC # BLD AUTO: 3.9 10E3/UL (ref 4–11)

## 2024-03-21 PROCEDURE — C1713 ANCHOR/SCREW BN/BN,TIS/BN: HCPCS | Performed by: ORTHOPAEDIC SURGERY

## 2024-03-21 PROCEDURE — 258N000003 HC RX IP 258 OP 636: Performed by: NURSE ANESTHETIST, CERTIFIED REGISTERED

## 2024-03-21 PROCEDURE — 258N000003 HC RX IP 258 OP 636: Performed by: ORTHOPAEDIC SURGERY

## 2024-03-21 PROCEDURE — 250N000013 HC RX MED GY IP 250 OP 250 PS 637: Performed by: ORTHOPAEDIC SURGERY

## 2024-03-21 PROCEDURE — 82565 ASSAY OF CREATININE: CPT | Performed by: PHYSICIAN ASSISTANT

## 2024-03-21 PROCEDURE — 73030 X-RAY EXAM OF SHOULDER: CPT | Mod: RT

## 2024-03-21 PROCEDURE — 250N000013 HC RX MED GY IP 250 OP 250 PS 637: Performed by: PHYSICIAN ASSISTANT

## 2024-03-21 PROCEDURE — 271N000001 HC OR GENERAL SUPPLY NON-STERILE: Performed by: ORTHOPAEDIC SURGERY

## 2024-03-21 PROCEDURE — 85027 COMPLETE CBC AUTOMATED: CPT | Performed by: PHYSICIAN ASSISTANT

## 2024-03-21 PROCEDURE — 272N000001 HC OR GENERAL SUPPLY STERILE: Performed by: ORTHOPAEDIC SURGERY

## 2024-03-21 PROCEDURE — 360N000077 HC SURGERY LEVEL 4, PER MIN: Performed by: ORTHOPAEDIC SURGERY

## 2024-03-21 PROCEDURE — 250N000013 HC RX MED GY IP 250 OP 250 PS 637: Performed by: NURSE ANESTHETIST, CERTIFIED REGISTERED

## 2024-03-21 PROCEDURE — C1776 JOINT DEVICE (IMPLANTABLE): HCPCS | Performed by: ORTHOPAEDIC SURGERY

## 2024-03-21 PROCEDURE — 84132 ASSAY OF SERUM POTASSIUM: CPT | Performed by: PHYSICIAN ASSISTANT

## 2024-03-21 PROCEDURE — 250N000011 HC RX IP 250 OP 636: Performed by: ORTHOPAEDIC SURGERY

## 2024-03-21 PROCEDURE — 250N000011 HC RX IP 250 OP 636: Performed by: PHYSICIAN ASSISTANT

## 2024-03-21 PROCEDURE — 710N000009 HC RECOVERY PHASE 1, LEVEL 1, PER MIN: Performed by: ORTHOPAEDIC SURGERY

## 2024-03-21 PROCEDURE — 250N000009 HC RX 250: Performed by: NURSE ANESTHETIST, CERTIFIED REGISTERED

## 2024-03-21 PROCEDURE — 999N000141 HC STATISTIC PRE-PROCEDURE NURSING ASSESSMENT: Performed by: ORTHOPAEDIC SURGERY

## 2024-03-21 PROCEDURE — 370N000017 HC ANESTHESIA TECHNICAL FEE, PER MIN: Performed by: ORTHOPAEDIC SURGERY

## 2024-03-21 PROCEDURE — 36415 COLL VENOUS BLD VENIPUNCTURE: CPT | Performed by: PHYSICIAN ASSISTANT

## 2024-03-21 PROCEDURE — 250N000011 HC RX IP 250 OP 636: Performed by: NURSE ANESTHETIST, CERTIFIED REGISTERED

## 2024-03-21 PROCEDURE — 82962 GLUCOSE BLOOD TEST: CPT

## 2024-03-21 DEVICE — IMPLANTABLE DEVICE
Type: IMPLANTABLE DEVICE | Site: SHOULDER | Status: FUNCTIONAL
Brand: COMPREHENSIVE® REVERSE SHOULDER

## 2024-03-21 DEVICE — IMPLANTABLE DEVICE
Type: IMPLANTABLE DEVICE | Site: SHOULDER | Status: FUNCTIONAL
Brand: COMPREHENSIVE® PROLONG®

## 2024-03-21 DEVICE — IMPLANTABLE DEVICE
Type: IMPLANTABLE DEVICE | Site: SHOULDER | Status: FUNCTIONAL
Brand: COMPREHENSIVE REVERSE SHOULDER

## 2024-03-21 DEVICE — IMPLANTABLE DEVICE
Type: IMPLANTABLE DEVICE | Site: SHOULDER | Status: FUNCTIONAL
Brand: COMPREHENSIVE®

## 2024-03-21 DEVICE — IMPLANTABLE DEVICE
Type: IMPLANTABLE DEVICE | Site: SHOULDER | Status: FUNCTIONAL
Brand: COMPREHENSIVE® SHOULDER SYSTEM

## 2024-03-21 RX ORDER — PHENTERMINE HYDROCHLORIDE 15 MG/1
15 CAPSULE ORAL EVERY MORNING
Status: DISCONTINUED | OUTPATIENT
Start: 2024-03-22 | End: 2024-03-21

## 2024-03-21 RX ORDER — BISACODYL 10 MG
10 SUPPOSITORY, RECTAL RECTAL DAILY PRN
Status: DISCONTINUED | OUTPATIENT
Start: 2024-03-21 | End: 2024-03-22 | Stop reason: HOSPADM

## 2024-03-21 RX ORDER — ROSUVASTATIN CALCIUM 20 MG/1
20 TABLET, COATED ORAL AT BEDTIME
Status: DISCONTINUED | OUTPATIENT
Start: 2024-03-21 | End: 2024-03-22 | Stop reason: HOSPADM

## 2024-03-21 RX ORDER — FENTANYL CITRATE 50 UG/ML
50 INJECTION, SOLUTION INTRAMUSCULAR; INTRAVENOUS EVERY 5 MIN PRN
Status: CANCELLED | OUTPATIENT
Start: 2024-03-21

## 2024-03-21 RX ORDER — CEFAZOLIN SODIUM 2 G/100ML
2 INJECTION, SOLUTION INTRAVENOUS EVERY 8 HOURS
Qty: 200 ML | Refills: 0 | Status: COMPLETED | OUTPATIENT
Start: 2024-03-21 | End: 2024-03-22

## 2024-03-21 RX ORDER — LIDOCAINE 40 MG/G
CREAM TOPICAL
Status: DISCONTINUED | OUTPATIENT
Start: 2024-03-21 | End: 2024-03-22 | Stop reason: HOSPADM

## 2024-03-21 RX ORDER — ONDANSETRON 2 MG/ML
4 INJECTION INTRAMUSCULAR; INTRAVENOUS EVERY 30 MIN PRN
Status: CANCELLED | OUTPATIENT
Start: 2024-03-21

## 2024-03-21 RX ORDER — AMLODIPINE BESYLATE 10 MG/1
10 TABLET ORAL AT BEDTIME
Status: DISCONTINUED | OUTPATIENT
Start: 2024-03-22 | End: 2024-03-22 | Stop reason: HOSPADM

## 2024-03-21 RX ORDER — ONDANSETRON 2 MG/ML
INJECTION INTRAMUSCULAR; INTRAVENOUS PRN
Status: DISCONTINUED | OUTPATIENT
Start: 2024-03-21 | End: 2024-03-21

## 2024-03-21 RX ORDER — ACETAMINOPHEN 325 MG/1
975 TABLET ORAL ONCE
Status: COMPLETED | OUTPATIENT
Start: 2024-03-21 | End: 2024-03-21

## 2024-03-21 RX ORDER — DEXAMETHASONE SODIUM PHOSPHATE 4 MG/ML
INJECTION, SOLUTION INTRA-ARTICULAR; INTRALESIONAL; INTRAMUSCULAR; INTRAVENOUS; SOFT TISSUE PRN
Status: DISCONTINUED | OUTPATIENT
Start: 2024-03-21 | End: 2024-03-21

## 2024-03-21 RX ORDER — ASPIRIN 81 MG/1
81 TABLET ORAL 2 TIMES DAILY
Status: DISCONTINUED | OUTPATIENT
Start: 2024-03-21 | End: 2024-03-22 | Stop reason: HOSPADM

## 2024-03-21 RX ORDER — CEFAZOLIN SODIUM/WATER 2 G/20 ML
2 SYRINGE (ML) INTRAVENOUS
Status: DISCONTINUED | OUTPATIENT
Start: 2024-03-21 | End: 2024-03-21 | Stop reason: HOSPADM

## 2024-03-21 RX ORDER — MAGNESIUM SULFATE HEPTAHYDRATE 40 MG/ML
2 INJECTION, SOLUTION INTRAVENOUS ONCE
Status: DISCONTINUED | OUTPATIENT
Start: 2024-03-21 | End: 2024-03-21 | Stop reason: HOSPADM

## 2024-03-21 RX ORDER — ONDANSETRON 4 MG/1
4 TABLET, ORALLY DISINTEGRATING ORAL EVERY 30 MIN PRN
Status: CANCELLED | OUTPATIENT
Start: 2024-03-21

## 2024-03-21 RX ORDER — NALOXONE HYDROCHLORIDE 0.4 MG/ML
0.4 INJECTION, SOLUTION INTRAMUSCULAR; INTRAVENOUS; SUBCUTANEOUS
Status: DISCONTINUED | OUTPATIENT
Start: 2024-03-21 | End: 2024-03-22 | Stop reason: HOSPADM

## 2024-03-21 RX ORDER — DIPHENHYDRAMINE HCL 12.5 MG/5ML
12.5 SOLUTION ORAL EVERY 6 HOURS PRN
Status: DISCONTINUED | OUTPATIENT
Start: 2024-03-21 | End: 2024-03-22 | Stop reason: HOSPADM

## 2024-03-21 RX ORDER — TRANEXAMIC ACID 650 MG/1
1950 TABLET ORAL ONCE
Status: COMPLETED | OUTPATIENT
Start: 2024-03-21 | End: 2024-03-21

## 2024-03-21 RX ORDER — ASPIRIN 325 MG
325 TABLET, DELAYED RELEASE (ENTERIC COATED) ORAL DAILY
Qty: 30 TABLET | Refills: 0 | Status: SHIPPED | OUTPATIENT
Start: 2024-03-21 | End: 2024-03-22

## 2024-03-21 RX ORDER — LIDOCAINE 40 MG/G
CREAM TOPICAL
Status: DISCONTINUED | OUTPATIENT
Start: 2024-03-21 | End: 2024-03-21 | Stop reason: HOSPADM

## 2024-03-21 RX ORDER — GABAPENTIN 100 MG/1
100 CAPSULE ORAL
Status: COMPLETED | OUTPATIENT
Start: 2024-03-21 | End: 2024-03-21

## 2024-03-21 RX ORDER — HYDROMORPHONE HCL IN WATER/PF 6 MG/30 ML
0.4 PATIENT CONTROLLED ANALGESIA SYRINGE INTRAVENOUS
Status: DISCONTINUED | OUTPATIENT
Start: 2024-03-21 | End: 2024-03-22 | Stop reason: HOSPADM

## 2024-03-21 RX ORDER — OXYCODONE HYDROCHLORIDE 5 MG/1
10 TABLET ORAL EVERY 4 HOURS PRN
Status: DISCONTINUED | OUTPATIENT
Start: 2024-03-21 | End: 2024-03-22 | Stop reason: HOSPADM

## 2024-03-21 RX ORDER — OXYCODONE HYDROCHLORIDE 5 MG/1
5-10 TABLET ORAL EVERY 4 HOURS PRN
Qty: 26 TABLET | Refills: 0 | Status: ON HOLD | OUTPATIENT
Start: 2024-03-21 | End: 2024-08-29

## 2024-03-21 RX ORDER — FENTANYL CITRATE 50 UG/ML
INJECTION, SOLUTION INTRAMUSCULAR; INTRAVENOUS PRN
Status: DISCONTINUED | OUTPATIENT
Start: 2024-03-21 | End: 2024-03-21

## 2024-03-21 RX ORDER — OXYCODONE HYDROCHLORIDE 5 MG/1
5 TABLET ORAL EVERY 4 HOURS PRN
Status: DISCONTINUED | OUTPATIENT
Start: 2024-03-21 | End: 2024-03-22 | Stop reason: HOSPADM

## 2024-03-21 RX ORDER — FAMOTIDINE 20 MG/1
20 TABLET, FILM COATED ORAL 2 TIMES DAILY
Status: DISCONTINUED | OUTPATIENT
Start: 2024-03-21 | End: 2024-03-21 | Stop reason: DRUGHIGH

## 2024-03-21 RX ORDER — BUPROPION HYDROCHLORIDE 150 MG/1
150 TABLET ORAL DAILY
Status: DISCONTINUED | OUTPATIENT
Start: 2024-03-22 | End: 2024-03-22 | Stop reason: HOSPADM

## 2024-03-21 RX ORDER — ONDANSETRON 2 MG/ML
4 INJECTION INTRAMUSCULAR; INTRAVENOUS EVERY 6 HOURS PRN
Status: DISCONTINUED | OUTPATIENT
Start: 2024-03-21 | End: 2024-03-22 | Stop reason: HOSPADM

## 2024-03-21 RX ORDER — TOPIRAMATE 25 MG/1
50 TABLET, FILM COATED ORAL 2 TIMES DAILY
Status: DISCONTINUED | OUTPATIENT
Start: 2024-03-22 | End: 2024-03-22 | Stop reason: HOSPADM

## 2024-03-21 RX ORDER — SODIUM CHLORIDE, SODIUM LACTATE, POTASSIUM CHLORIDE, CALCIUM CHLORIDE 600; 310; 30; 20 MG/100ML; MG/100ML; MG/100ML; MG/100ML
INJECTION, SOLUTION INTRAVENOUS CONTINUOUS
Status: DISCONTINUED | OUTPATIENT
Start: 2024-03-21 | End: 2024-03-21 | Stop reason: HOSPADM

## 2024-03-21 RX ORDER — NALOXONE HYDROCHLORIDE 0.4 MG/ML
0.1 INJECTION, SOLUTION INTRAMUSCULAR; INTRAVENOUS; SUBCUTANEOUS
Status: CANCELLED | OUTPATIENT
Start: 2024-03-21

## 2024-03-21 RX ORDER — FAMOTIDINE 20 MG/1
20 TABLET, FILM COATED ORAL EVERY 24 HOURS
Status: DISCONTINUED | OUTPATIENT
Start: 2024-03-21 | End: 2024-03-22 | Stop reason: HOSPADM

## 2024-03-21 RX ORDER — SODIUM CHLORIDE, SODIUM LACTATE, POTASSIUM CHLORIDE, CALCIUM CHLORIDE 600; 310; 30; 20 MG/100ML; MG/100ML; MG/100ML; MG/100ML
INJECTION, SOLUTION INTRAVENOUS CONTINUOUS
Status: DISCONTINUED | OUTPATIENT
Start: 2024-03-21 | End: 2024-03-22 | Stop reason: HOSPADM

## 2024-03-21 RX ORDER — ONDANSETRON 4 MG/1
4 TABLET, ORALLY DISINTEGRATING ORAL EVERY 6 HOURS PRN
Status: DISCONTINUED | OUTPATIENT
Start: 2024-03-21 | End: 2024-03-22 | Stop reason: HOSPADM

## 2024-03-21 RX ORDER — METHOCARBAMOL 500 MG/1
500 TABLET, FILM COATED ORAL EVERY 6 HOURS PRN
Status: DISCONTINUED | OUTPATIENT
Start: 2024-03-21 | End: 2024-03-22 | Stop reason: HOSPADM

## 2024-03-21 RX ORDER — NALOXONE HYDROCHLORIDE 0.4 MG/ML
0.2 INJECTION, SOLUTION INTRAMUSCULAR; INTRAVENOUS; SUBCUTANEOUS
Status: DISCONTINUED | OUTPATIENT
Start: 2024-03-21 | End: 2024-03-22 | Stop reason: HOSPADM

## 2024-03-21 RX ORDER — BACILLUS COAGULANS/INULIN 1B-250 MG
1 CAPSULE ORAL DAILY
Status: ON HOLD | COMMUNITY
Start: 2023-06-16 | End: 2024-08-28

## 2024-03-21 RX ORDER — CEFAZOLIN SODIUM/WATER 2 G/20 ML
2 SYRINGE (ML) INTRAVENOUS SEE ADMIN INSTRUCTIONS
Status: DISCONTINUED | OUTPATIENT
Start: 2024-03-21 | End: 2024-03-21 | Stop reason: HOSPADM

## 2024-03-21 RX ORDER — ACETAMINOPHEN 325 MG/1
650 TABLET ORAL EVERY 4 HOURS PRN
Status: DISCONTINUED | OUTPATIENT
Start: 2024-03-24 | End: 2024-03-22 | Stop reason: HOSPADM

## 2024-03-21 RX ORDER — ACETAMINOPHEN 325 MG/1
650 TABLET ORAL EVERY 4 HOURS PRN
Status: ON HOLD
Start: 2024-03-21 | End: 2024-08-29

## 2024-03-21 RX ORDER — HYDROMORPHONE HCL IN WATER/PF 6 MG/30 ML
0.2 PATIENT CONTROLLED ANALGESIA SYRINGE INTRAVENOUS
Status: DISCONTINUED | OUTPATIENT
Start: 2024-03-21 | End: 2024-03-22 | Stop reason: HOSPADM

## 2024-03-21 RX ORDER — AMOXICILLIN 250 MG
1 CAPSULE ORAL 2 TIMES DAILY
Status: DISCONTINUED | OUTPATIENT
Start: 2024-03-21 | End: 2024-03-22 | Stop reason: HOSPADM

## 2024-03-21 RX ORDER — ROPIVACAINE HYDROCHLORIDE 5 MG/ML
INJECTION, SOLUTION EPIDURAL; INFILTRATION; PERINEURAL
Status: COMPLETED | OUTPATIENT
Start: 2024-03-21 | End: 2024-03-21

## 2024-03-21 RX ORDER — ACETAMINOPHEN 325 MG/1
975 TABLET ORAL EVERY 8 HOURS
Qty: 27 TABLET | Refills: 0 | Status: DISCONTINUED | OUTPATIENT
Start: 2024-03-21 | End: 2024-03-22 | Stop reason: HOSPADM

## 2024-03-21 RX ORDER — AMOXICILLIN 250 MG
1-2 CAPSULE ORAL 2 TIMES DAILY
Status: ON HOLD
Start: 2024-03-21 | End: 2024-08-28

## 2024-03-21 RX ORDER — ACETAMINOPHEN 325 MG/1
975 TABLET ORAL ONCE
Status: DISCONTINUED | OUTPATIENT
Start: 2024-03-21 | End: 2024-03-21

## 2024-03-21 RX ORDER — SODIUM CHLORIDE, SODIUM LACTATE, POTASSIUM CHLORIDE, CALCIUM CHLORIDE 600; 310; 30; 20 MG/100ML; MG/100ML; MG/100ML; MG/100ML
INJECTION, SOLUTION INTRAVENOUS CONTINUOUS
Status: CANCELLED | OUTPATIENT
Start: 2024-03-21

## 2024-03-21 RX ORDER — LEVOTHYROXINE SODIUM 112 UG/1
112 TABLET ORAL DAILY
Status: DISCONTINUED | OUTPATIENT
Start: 2024-03-22 | End: 2024-03-22 | Stop reason: HOSPADM

## 2024-03-21 RX ORDER — PROPOFOL 10 MG/ML
INJECTION, EMULSION INTRAVENOUS CONTINUOUS PRN
Status: DISCONTINUED | OUTPATIENT
Start: 2024-03-21 | End: 2024-03-21

## 2024-03-21 RX ORDER — PROCHLORPERAZINE MALEATE 5 MG
5 TABLET ORAL EVERY 6 HOURS PRN
Status: DISCONTINUED | OUTPATIENT
Start: 2024-03-21 | End: 2024-03-22 | Stop reason: HOSPADM

## 2024-03-21 RX ORDER — DEXAMETHASONE SODIUM PHOSPHATE 10 MG/ML
INJECTION, SOLUTION INTRAMUSCULAR; INTRAVENOUS
Status: COMPLETED | OUTPATIENT
Start: 2024-03-21 | End: 2024-03-21

## 2024-03-21 RX ORDER — POLYETHYLENE GLYCOL 3350 17 G/17G
17 POWDER, FOR SOLUTION ORAL DAILY
Status: DISCONTINUED | OUTPATIENT
Start: 2024-03-22 | End: 2024-03-22 | Stop reason: HOSPADM

## 2024-03-21 RX ORDER — FENTANYL CITRATE 50 UG/ML
25 INJECTION, SOLUTION INTRAMUSCULAR; INTRAVENOUS EVERY 5 MIN PRN
Status: CANCELLED | OUTPATIENT
Start: 2024-03-21

## 2024-03-21 RX ORDER — LIDOCAINE HYDROCHLORIDE 20 MG/ML
INJECTION, SOLUTION INFILTRATION; PERINEURAL PRN
Status: DISCONTINUED | OUTPATIENT
Start: 2024-03-21 | End: 2024-03-21

## 2024-03-21 RX ADMIN — MIDAZOLAM 2 MG: 1 INJECTION INTRAMUSCULAR; INTRAVENOUS at 08:50

## 2024-03-21 RX ADMIN — ROPIVACAINE HYDROCHLORIDE 20 ML: 5 INJECTION, SOLUTION EPIDURAL; INFILTRATION; PERINEURAL at 09:00

## 2024-03-21 RX ADMIN — SODIUM CHLORIDE, POTASSIUM CHLORIDE, SODIUM LACTATE AND CALCIUM CHLORIDE: 600; 310; 30; 20 INJECTION, SOLUTION INTRAVENOUS at 14:37

## 2024-03-21 RX ADMIN — DEXMEDETOMIDINE HYDROCHLORIDE 8 MCG: 100 INJECTION, SOLUTION INTRAVENOUS at 11:18

## 2024-03-21 RX ADMIN — HYDROMORPHONE HYDROCHLORIDE 0.2 MG: 0.2 INJECTION, SOLUTION INTRAMUSCULAR; INTRAVENOUS; SUBCUTANEOUS at 16:36

## 2024-03-21 RX ADMIN — ONDANSETRON 4 MG: 2 INJECTION INTRAMUSCULAR; INTRAVENOUS at 11:56

## 2024-03-21 RX ADMIN — DEXMEDETOMIDINE HYDROCHLORIDE 4 MCG: 100 INJECTION, SOLUTION INTRAVENOUS at 11:31

## 2024-03-21 RX ADMIN — FAMOTIDINE 20 MG: 20 TABLET, FILM COATED ORAL at 20:20

## 2024-03-21 RX ADMIN — DEXAMETHASONE SODIUM PHOSPHATE 4 MG: 10 INJECTION, SOLUTION INTRAMUSCULAR; INTRAVENOUS at 09:00

## 2024-03-21 RX ADMIN — ACETAMINOPHEN 975 MG: 325 TABLET, FILM COATED ORAL at 07:52

## 2024-03-21 RX ADMIN — CEFAZOLIN SODIUM 2 G: 2 INJECTION, SOLUTION INTRAVENOUS at 17:38

## 2024-03-21 RX ADMIN — TRANEXAMIC ACID 1950 MG: 650 TABLET ORAL at 07:50

## 2024-03-21 RX ADMIN — LIDOCAINE HYDROCHLORIDE 100 MG: 20 INJECTION, SOLUTION INFILTRATION; PERINEURAL at 10:28

## 2024-03-21 RX ADMIN — GABAPENTIN 100 MG: 100 CAPSULE ORAL at 07:52

## 2024-03-21 RX ADMIN — DOCUSATE SODIUM 50 MG AND SENNOSIDES 8.6 MG 1 TABLET: 8.6; 5 TABLET, FILM COATED ORAL at 20:20

## 2024-03-21 RX ADMIN — PROPOFOL 180 MCG/KG/MIN: 10 INJECTION, EMULSION INTRAVENOUS at 10:29

## 2024-03-21 RX ADMIN — LIDOCAINE HYDROCHLORIDE 0.2 ML: 10 INJECTION, SOLUTION EPIDURAL; INFILTRATION; INTRACAUDAL; PERINEURAL at 08:42

## 2024-03-21 RX ADMIN — PROPOFOL 200 MCG/KG/MIN: 10 INJECTION, EMULSION INTRAVENOUS at 10:28

## 2024-03-21 RX ADMIN — FENTANYL CITRATE 50 MCG: 50 INJECTION INTRAMUSCULAR; INTRAVENOUS at 08:50

## 2024-03-21 RX ADMIN — Medication 2 G: at 10:27

## 2024-03-21 RX ADMIN — DEXMEDETOMIDINE HYDROCHLORIDE 4 MCG: 100 INJECTION, SOLUTION INTRAVENOUS at 11:29

## 2024-03-21 RX ADMIN — DEXAMETHASONE SODIUM PHOSPHATE 10 MG: 4 INJECTION, SOLUTION INTRA-ARTICULAR; INTRALESIONAL; INTRAMUSCULAR; INTRAVENOUS; SOFT TISSUE at 10:28

## 2024-03-21 RX ADMIN — ROPIVACAINE HYDROCHLORIDE 10 ML: 5 INJECTION, SOLUTION EPIDURAL; INFILTRATION; PERINEURAL at 09:02

## 2024-03-21 RX ADMIN — FENTANYL CITRATE 50 MCG: 50 INJECTION INTRAMUSCULAR; INTRAVENOUS at 08:56

## 2024-03-21 RX ADMIN — ACETAMINOPHEN 975 MG: 325 TABLET, FILM COATED ORAL at 16:33

## 2024-03-21 RX ADMIN — SODIUM CHLORIDE, POTASSIUM CHLORIDE, SODIUM LACTATE AND CALCIUM CHLORIDE: 600; 310; 30; 20 INJECTION, SOLUTION INTRAVENOUS at 08:43

## 2024-03-21 ASSESSMENT — ACTIVITIES OF DAILY LIVING (ADL)
ADLS_ACUITY_SCORE: 23
ADLS_ACUITY_SCORE: 22
ADLS_ACUITY_SCORE: 23
ADLS_ACUITY_SCORE: 22
ADLS_ACUITY_SCORE: 23
ADLS_ACUITY_SCORE: 22
ADLS_ACUITY_SCORE: 23
ADLS_ACUITY_SCORE: 23
ADLS_ACUITY_SCORE: 22
ADLS_ACUITY_SCORE: 22

## 2024-03-21 NOTE — ANESTHESIA POSTPROCEDURE EVALUATION
Patient: Marilee Marks    Procedure: Procedure(s):  Reverse total shoulder arthroplasty       Anesthesia Type:  General    Note:  Disposition: Inpatient   Postop Pain Control: Uneventful            Sign Out: Well controlled pain   PONV: No   Neuro/Psych: Uneventful            Sign Out: Acceptable/Baseline neuro status   Airway/Respiratory: Uneventful            Sign Out: Acceptable/Baseline resp. status   CV/Hemodynamics: Uneventful            Sign Out: Acceptable CV status; No obvious hypovolemia; No obvious fluid overload   Other NRE: NONE   DID A NON-ROUTINE EVENT OCCUR? No           Last vitals:  Vitals Value Taken Time   /67 03/21/24 1400   Temp 36.8  C (98.2  F) 03/21/24 1400   Pulse 85 03/21/24 1400   Resp 15 03/21/24 1400   SpO2 93 % 03/21/24 1408   Vitals shown include unfiled device data.    Electronically Signed By: ALANIS Kinsey CRNA  March 21, 2024  2:33 PM

## 2024-03-21 NOTE — ANESTHESIA CARE TRANSFER NOTE
Patient: Marilee Marks    Procedure: Procedure(s):  Reverse total shoulder arthroplasty       Diagnosis: Infraspinatus tendon tear [S46.819A]  Tear of right supraspinatus tendon [M75.101]  Diagnosis Additional Information: No value filed.    Anesthesia Type:   General     Note:    Oropharynx: oropharynx clear of all foreign objects and spontaneously breathing  Level of Consciousness: awake and drowsy  Oxygen Supplementation: room air    Independent Airway: airway patency satisfactory and stable  Dentition: dentition unchanged  Vital Signs Stable: post-procedure vital signs reviewed and stable  Report to RN Given: handoff report given  Patient transferred to: PACU    Handoff Report: Identifed the Patient, Identified the Reponsible Provider, Reviewed the pertinent medical history, Discussed the surgical course, Reviewed Intra-OP anesthesia mangement and issues during anesthesia, Set expectations for post-procedure period and Allowed opportunity for questions and acknowledgement of understanding      Vitals:  Vitals Value Taken Time   /67 03/21/24 1400   Temp 36.8  C (98.2  F) 03/21/24 1400   Pulse 85 03/21/24 1400   Resp 15 03/21/24 1400   SpO2 93 % 03/21/24 1408   Vitals shown include unfiled device data.    Electronically Signed By: ALANIS Kinsey CRNA  March 21, 2024  2:33 PM

## 2024-03-21 NOTE — BRIEF OP NOTE
"[facility] [place]    Brief Operative Note    Pre-operative diagnosis: Infraspinatus tendon tear [S46.819A]  Tear of right supraspinatus tendon [M75.101]  Post-operative diagnosis Same as pre-operative diagnosis    Procedure: Reverse total shoulder arthroplasty, Right - Shoulder    Surgeon: Surgeon(s) and Role:     * Fabian Deras MD - Primary     * Manpreet Horton PA-C - Assisting  Anesthesia: General with Block   Estimated Blood Loss: 150cc    Drains: None  Specimens: * No specimens in log *  Findings:   None.  Complications: None.  Implants:   Implant Name Type Inv. Item Serial No.  Lot No. LRB No. Used Action   PIN STEINMANN BIOMET REV SHLDER 3.2MM 9\" THRD SS [id number] - [id number] Wire PIN STEINMANN BIOMET REV SHLDER 3.2MM 9\" THRD SS [id number]  [employer] [id number] Right 1 Used as a Supply   IMP BASEPLATE MINI GLENOSPHERE BIOM REV SHLDR 25MM [id number] - [id number] Total Joint Component/Insert IMP BASEPLATE MINI GLENOSPHERE BIOM REV SHLDR 25MM [id number]  [employer] [id number] Right 1 Implanted   IMP SCR CENTRAL BIOMET REVERSE SHLDR 6.5X25MM 779634 - [id number] Metallic Hardware/[place] IMP SCR CENTRAL BIOMET REVERSE SHLDR 6.5X25MM 440035  [employer] [id number] Right 1 Implanted   IMP SCR LOCKING BIOM REV SHLDR 3.5 HEX 4.20C78VD 281354 - [id number] Total Joint Component/Insert IMP SCR LOCKING BIOM REV SHLDR 3.5 HEX 4.76L77TV 350862  [employer] [id number] Right 1 Implanted   IMP SCR LOCKING BIOM REV SHLDR 3.5 HEX 4.23T67JV 614444 - [id number] Metallic Hardware/[place] IMP SCR LOCKING BIOM REV SHLDR 3.5 HEX 4.38C13CS 322334  [employer] [id number] Right 1 Implanted   IMP SCR LOCKING BIOM REV SHLDR 3.5 HEX 4.13H70UO 866767 - [id number] Metallic Hardware/[place] IMP SCR LOCKING BIOM REV SHLDR 3.5 HEX 4.53U65FD 204979  [employer] [id number] Right 1 Implanted   IMP GLENOSPHERE BIOM REV SHLDR 36MM [facility] 532296 - [id number] Total Joint Component/Insert IMP " GLENOSPHERE BIOM REV SHLDR 36MM STD 523879  [employer] [id number] Right 1 Implanted   IMP SCR LOCKING BIOM REV SHLDR 3.5 HEX 4.31D19FU 675147 - [id number] Total Joint Component/Insert IMP SCR LOCKING BIOM REV SHLDR 3.5 HEX 4.77S55AW 716649  [employer] [id number] Right 1 Implanted   IMP STEM MINI HUMERAL BIOM SHLDR 13MM 791055 - [id number] Total Joint Component/Insert IMP STEM MINI HUMERAL BIOM SHLDR 13MM 697696  [employer] [id number] Right 1 Implanted   IMP BEARING HUMERAL ZIM 36MM STD PRLNG [id number] - [id number] Total Joint Component/Insert IMP BEARING HUMERAL ZIM 36MM STD PRLNG [id number]  [employer] [id number] Right 1 Implanted   IMP HUMERAL TRAY ZIM RVRS SHLDR STD [id number] - [id number] Total Joint Component/Insert IMP HUMERAL TRAY ZIM RVRS SHLDR STD [id number]  [employer] [id number] Right 1 Implanted

## 2024-03-21 NOTE — ANESTHESIA PROCEDURE NOTES
Airway    Staff -        Resident/Fellow: Marilee Silverman       CRNA: Navin Cabrera APRN CRNA       Performed By: CRNA and SRNAIndications and Patient Condition       Indications for airway management: obi-procedural       Induction type:intravenous       Mask difficulty assessment: 1 - vent by mask    Final Airway Details       Final airway type: supraglottic airway    Supraglottic Airway Details        Type: LMA       Brand: Air-Q       LMA size: 4    Post intubation assessment        Placement verified by: capnometry, equal breath sounds and chest rise        Number of attempts at approach: 1       Number of other approaches attempted: 0       Secured with: tape       Ease of procedure: easy       Dentition: Intact and Unchanged

## 2024-03-21 NOTE — PHARMACY
Phentermine discontinued for hospitalization per non-essential medication holiday policy.  Grace Richmond, Pharm.D.

## 2024-03-21 NOTE — OP NOTE
Procedure Date: 03/21/24     PREOPERATIVE DIAGNOSIS:  Right shoulder rotator cuff tear with shoulder degenerative arthritis.     POSTOPERATIVE DIAGNOSIS:  same     PROCEDURE PERFORMED:  Right Reverse  total shoulder arthroplasty utilizing a Biomet size 12 mini humeral stem, standard glenoid baseplate with central fixation screww and 4 locking screws, Standard 35mm glenosphere in offset postion C, 36mm  Standard glenoid with +0 Humeral tray.     SURGEON:  Fabian Deras MD     ASSISTANT:  Manpreet Horton PA-C.  Manpreet Horton PA-C, was present throughout the procedure, critical for patient positioning, prepping, draping, safe progression of procedure, wound closure, and sling placement.     DESCRIPTION OF PROCEDURE:  After informed risks, benefits, alternatives, and expected outcomes of the procedure, the patient desired to proceed.  She was brought to the operating suite where he was placed under general anesthesia, received 2 grams of Ancef, 1 gram of tranexamic acid.  A timeout verification step was completed.  He was positioned in low beach chair position.  Her right upper extremity was prepped and draped in a manner appropriate for procedure.     A deltopectoral approach was taken to the shoulder.  The cephalic vein was protected and retracted laterally.  Cobell retractor was placed.  Conjoined tendon was identified.  Clavipectoral fascia was divided, and the biceps tendon was not  identified within the groove.   Attention was then directed towards the subscapularis tendon, it was elevated off the lesser tuberosity, tagged with a #1 Ethibond stitches and retracted.  It was then divided inferiorly.  Leash of vessels was ligated.  Retractor was placed between the subscap and the joint capsule to protect the axillary nerve throughout these procedures.  External rotation was maintained to protect the axillary nerve.  The inferior capsule was then sharply divided with Metzenbaum scissors.  The subscap was  retracted, and attention was directed towards completing a proximal humeral resection.  A 30-degree retroverted x 45-degree proximal resection was completed using the Biomet guide.  This was followed by placement of Fukuda retractor, circumferential labral excision.      The anterior labrum was cleared of soft tissue, and a Bankart retractor was placed.  The modified rabbit ear retractor was placed posteriorly and excellent exposure of the glenoid was achieved.  The central pin was placed utilizing a  glenoid guide.  This was reamed to correct slight retroversion.  A standard baseplate was then positioned over the guidepin all seating was demonstrated in the central lobe it was utilized 25 mm central fixation screw was then secured there was taken to him and straight complete seating of the central screw.  Peripheral locking screws were then placed 25 mm inferior superior and 15 mm locking screws were placed utilizing the fixed we will guide for each.  Table fixation was felt to been achieved.  A standard 36 mm glenosphere in the C offset position was then assembled on the back table and secured to the glenoid baseplate and checked for stability.     The humeral head could then be fully exposed using a brown retractor and Rojas retractors, canal finder, sequential reaming up to 12 mm, followed by broaching up to 12 mm was completed.  A standard trial humeral tray and 36 mm +0 36mm bearing surface were positioned excellent range of motion stability and tensioning of the conjoined tendon with 90 degrees of internal rotation and 90 degrees of abduction demonstrated.  Final 12 mm mini humeral stem was secured the Standard humeral tray with +0 36mm bearing surface was assembled secured to the humeral stem.      Wounds were copiously irrigated and subcutaneous was closed with 2-0 Vicryl.  Skin was closed with a 3-0 Stratafix, Steri-Strips, and Aquacel dressing.  The patient tolerated the procedure well.  Returned to  recovery in stable condition.     ESTIMATED BLOOD LOSS:  150 mL.     Fabian Deras MD

## 2024-03-21 NOTE — MEDICATION SCRIBE - ADMISSION MEDICATION HISTORY
Medication Scribe Admission Medication History    Admission medication history is complete. The information provided in this note is only as accurate as the sources available at the time of the update.    Information Source(s): Patient via phone    Pertinent Information: Patient has been holding her Phentermine for the last few days-Topiramate is taken with Phentermine and will not need that while here. Amlodipine was recently increased from 5 mg to 10 mg    Changes made to PTA medication list:  Added: None  Deleted: None  Changed: None    Allergies reviewed with patient and updates made in EHR: yes    Medication History Completed By: Sirgid Bennett 3/21/2024 5:45 PM    PTA Med List   Medication Sig Last Dose    acetaminophen (TYLENOL) 325 MG tablet Take 2 tablets (650 mg) by mouth every 4 hours as needed for other (mild pain)     alendronate (FOSAMAX) 70 MG tablet Take 70 mg by mouth every 7 days 3/12/2024    amLODIPine (NORVASC) 10 MG tablet Take 10 mg by mouth daily 3/20/2024 at 2000    aspirin (ASA) 325 MG EC tablet Take 1 tablet (325 mg) by mouth daily     Bacillus Coagulans-Inulin (PROBIOTIC) 1-250 BILLION-MG CAPS Take 1 capsule by mouth daily Past Week    buPROPion (WELLBUTRIN XL) 150 MG 24 hr tablet TAKE 1 TABLET EVERY MORNING (Patient taking differently: Take 150 mg by mouth every morning) 3/21/2024 at am    celecoxib (CELEBREX) 200 MG capsule Take 1 capsule (200 mg) by mouth daily 3/21/2024 at 0600    CENTRUM SILVER PO TABS Take one tablet by mouth every day Past Week    levothyroxine (SYNTHROID/LEVOTHROID) 112 MCG tablet Take 1 tablet (112 mcg) by mouth daily 3/21/2024 at 0600    magnesium 250 MG tablet Take 1 tablet by mouth daily Past Week    oxyCODONE (ROXICODONE) 5 MG tablet Take 1-2 tablets (5-10 mg) by mouth every 4 hours as needed for moderate to severe pain     phentermine (ADIPEX-P) 15 MG capsule Take 1 capsule (15 mg) by mouth every morning Past Week    rosuvastatin (CRESTOR) 20 MG tablet Take  1 tablet by mouth at bedtime 3/20/2024 at pm    senna-docusate (SENOKOT-S/PERICOLACE) 8.6-50 MG tablet Take 1-2 tablets by mouth 2 times daily Take while on oral narcotics to prevent or treat constipation.     topiramate (TOPAMAX) 50 MG tablet Take 1 tablet (50 mg) by mouth 2 times daily 3/20/2024 at pm    VITAMIN D3 1000 UNIT PO CAPS Take 1 capsule by mouth daily 3/20/2024 at 2000

## 2024-03-21 NOTE — PROGRESS NOTES
"[facility] [place] ADMISSION NOTE    Patient admitted to room 2306 at approximately 2:30 pm via cart from surgery. Patient was accompanied by spouse.     Verbal SBAR report received from PACU RN prior to patient arrival.     Patient ambulated to bed with one assist. Patient alert and oriented X 4. The patient is not having any pain.  . Admission vital signs: Blood pressure (!) 140/80, pulse 80, temperature 98.2  F (36.8  C), temperature source Temporal, resp. rate 15, height 1.626 m (5' 4\"), weight 91.6 kg (201 lb 15.1 oz), SpO2 93%, not currently breastfeeding. Patient and spouse and significant other was oriented to plan of care, call light, bed controls, tv, telephone, bathroom, and visiting hours.     Risk Assessment    The following safety risks were identified during admission: fall. Yellow risk band applied: YES.     Skin Initial Assessment    This writer admitted this patient and completed a full skin assessment and Gio score in the Adult PCS flowsheet. Appropriate interventions initiated as needed.     Secondary skin check completed by MARLEY Izquierdo.         Education    Patient has a [place] to Observation order: No  Observation education completed and documented: No      CORINE DUMONT RN      "

## 2024-03-21 NOTE — ANESTHESIA PROCEDURE NOTES
Interscalene Procedure Note    Pre-Procedure   Staff -        CRNA: Marco A Núñez APRN CRNA       Performed By: CRNA       Location: pre-op       Procedure Start/Stop Times: 3/21/2024 8:47 AM and 3/21/2024 9:04 AM       Pre-Anesthestic Checklist: patient identified, IV checked, site marked, risks and benefits discussed, informed consent, monitors and equipment checked, pre-op evaluation, at physician/surgeon's request and post-op pain management  Timeout:       Correct Patient: Yes        Correct Procedure: Yes        Correct Site: Yes        Correct Position: Yes        Correct Laterality: Yes        Site Marked: Yes  Procedure Documentation  Procedure: interscalene       Laterality: right       Patient Position: sitting       Patient Prep/Sterile Barriers: sterile gloves, mask, patient draped       Skin prep: Chloraprep       Local skin infiltrated with 1 mL of 1% lidocaine.        Needle Type: insulated and short bevel       Needle Gauge: 22.        Needle Length (Inches): 2        Ultrasound guided       1. Ultrasound was used to identify targeted nerve, plexus, vascular marker, or fascial plane and place a needle adjacent to it in real-time.       2. Ultrasound was used to visualize the spread of anesthetic in close proximity to the above referenced structure.       3. A permanent image is entered into the patient's record.       4. The visualized anatomic structures appeared normal.       5. There were no apparent abnormal pathologic findings.       Nerve Stim: Initial Level 1 mA.  Lowest motor response 0.48 mA.    Assessment/Narrative         The placement was negative for: blood aspirated, painful injection and site bleeding       Paresthesias: No.       Bolus given via needle. no blood aspirated via catheter.        Secured via.        Insertion/Infusion Method: Single Shot       Complications: none       Injection made incrementally with aspirations every 5 mL.    Medication(s) Administered  "  Ropivacaine 0.5% PF (Infiltration) - Infiltration   20 mL - 3/21/2024 9:00:00 AM   10 mL - 3/21/2024 9:02:00 AM  Dexamethasone 10 mg/mL PF (Perineural) - Perineural   4 mg - 3/21/2024 9:00:00 AM  Medication Administration Time: 3/21/2024 8:47 AM      FOR [facility] ([facility] ONLY:   Pain Team Contact information: please page the Pain Team Via PlayWith. Search \"Pain\". During daytime hours, please page the attending first. At night please page the resident first.      "

## 2024-03-21 NOTE — OR NURSING
[facility] TRANSPORT NOTE  Data:   Reason for Transport:  PACU discharge criteria met    Marilee Marks was transported to 2306 via cart at 1415.  Patient was accompanied by Nursing Assistant. Equipment used for transport: None. Family was aware of reason for transport: yes    Action:  Report: given to Kristyn ROACH    Response:  Patient's condition when transferred off unit was stable.    Samantha Hernández RN

## 2024-03-22 ENCOUNTER — APPOINTMENT (OUTPATIENT)
Dept: OCCUPATIONAL THERAPY | Facility: CLINIC | Age: 79
End: 2024-03-22
Attending: ORTHOPAEDIC SURGERY
Payer: COMMERCIAL

## 2024-03-22 VITALS
RESPIRATION RATE: 18 BRPM | BODY MASS INDEX: 34.48 KG/M2 | HEIGHT: 64 IN | SYSTOLIC BLOOD PRESSURE: 127 MMHG | TEMPERATURE: 98.2 F | OXYGEN SATURATION: 96 % | HEART RATE: 86 BPM | DIASTOLIC BLOOD PRESSURE: 58 MMHG | WEIGHT: 201.94 LBS

## 2024-03-22 PROBLEM — N18.31 STAGE 3A CHRONIC KIDNEY DISEASE (H): Status: ACTIVE | Noted: 2023-05-15

## 2024-03-22 PROBLEM — I10 BENIGN ESSENTIAL HYPERTENSION: Status: ACTIVE | Noted: 2019-11-26

## 2024-03-22 LAB
GLUCOSE SERPL-MCNC: 126 MG/DL (ref 70–99)
HGB BLD-MCNC: 10.2 G/DL (ref 11.7–15.7)
HOLD SPECIMEN: NORMAL

## 2024-03-22 PROCEDURE — 97110 THERAPEUTIC EXERCISES: CPT | Mod: GO

## 2024-03-22 PROCEDURE — 82947 ASSAY GLUCOSE BLOOD QUANT: CPT | Performed by: ORTHOPAEDIC SURGERY

## 2024-03-22 PROCEDURE — 85018 HEMOGLOBIN: CPT | Performed by: ORTHOPAEDIC SURGERY

## 2024-03-22 PROCEDURE — 250N000011 HC RX IP 250 OP 636: Performed by: ORTHOPAEDIC SURGERY

## 2024-03-22 PROCEDURE — 250N000013 HC RX MED GY IP 250 OP 250 PS 637: Performed by: ORTHOPAEDIC SURGERY

## 2024-03-22 PROCEDURE — 97165 OT EVAL LOW COMPLEX 30 MIN: CPT | Mod: GO

## 2024-03-22 PROCEDURE — 99214 OFFICE O/P EST MOD 30 MIN: CPT

## 2024-03-22 PROCEDURE — 97535 SELF CARE MNGMENT TRAINING: CPT | Mod: GO

## 2024-03-22 PROCEDURE — 36415 COLL VENOUS BLD VENIPUNCTURE: CPT | Performed by: ORTHOPAEDIC SURGERY

## 2024-03-22 PROCEDURE — 250N000013 HC RX MED GY IP 250 OP 250 PS 637

## 2024-03-22 RX ORDER — METHOCARBAMOL 500 MG/1
500 TABLET, FILM COATED ORAL EVERY 6 HOURS PRN
Qty: 30 TABLET | Refills: 0 | Status: ON HOLD | OUTPATIENT
Start: 2024-03-22 | End: 2024-08-28

## 2024-03-22 RX ORDER — ASPIRIN 325 MG
325 TABLET, DELAYED RELEASE (ENTERIC COATED) ORAL DAILY
Qty: 30 TABLET | Refills: 0 | Status: ON HOLD | OUTPATIENT
Start: 2024-03-22 | End: 2024-08-29

## 2024-03-22 RX ORDER — ASPIRIN 81 MG/1
81 TABLET ORAL 2 TIMES DAILY
Qty: 84 TABLET | Refills: 0 | Status: SHIPPED | OUTPATIENT
Start: 2024-03-22 | End: 2024-03-22

## 2024-03-22 RX ADMIN — BUPROPION HYDROCHLORIDE 150 MG: 150 TABLET, FILM COATED, EXTENDED RELEASE ORAL at 08:42

## 2024-03-22 RX ADMIN — OXYCODONE HYDROCHLORIDE 5 MG: 5 TABLET ORAL at 05:58

## 2024-03-22 RX ADMIN — OXYCODONE HYDROCHLORIDE 5 MG: 5 TABLET ORAL at 01:17

## 2024-03-22 RX ADMIN — ASPIRIN 81 MG: 81 TABLET, COATED ORAL at 10:00

## 2024-03-22 RX ADMIN — DIPHENHYDRAMINE HYDROCHLORIDE 12.5 MG: 12.5 LIQUID ORAL at 05:58

## 2024-03-22 RX ADMIN — OXYCODONE HYDROCHLORIDE 10 MG: 5 TABLET ORAL at 09:59

## 2024-03-22 RX ADMIN — METHOCARBAMOL 500 MG: 500 TABLET ORAL at 09:12

## 2024-03-22 RX ADMIN — CEFAZOLIN SODIUM 2 G: 2 INJECTION, SOLUTION INTRAVENOUS at 02:53

## 2024-03-22 RX ADMIN — ASPIRIN 81 MG: 81 TABLET, COATED ORAL at 01:17

## 2024-03-22 RX ADMIN — ACETAMINOPHEN 975 MG: 325 TABLET, FILM COATED ORAL at 08:42

## 2024-03-22 RX ADMIN — LEVOTHYROXINE SODIUM 112 MCG: 112 TABLET ORAL at 08:42

## 2024-03-22 RX ADMIN — ACETAMINOPHEN 975 MG: 325 TABLET, FILM COATED ORAL at 01:16

## 2024-03-22 RX ADMIN — DOCUSATE SODIUM 50 MG AND SENNOSIDES 8.6 MG 1 TABLET: 8.6; 5 TABLET, FILM COATED ORAL at 08:42

## 2024-03-22 ASSESSMENT — ACTIVITIES OF DAILY LIVING (ADL)
ADLS_ACUITY_SCORE: 23

## 2024-03-22 NOTE — PROGRESS NOTES
"[facility] Orthopaedics Progress Note      Post-operative Day: 1 Day Post-Op    Procedure(s):  Reverse total shoulder arthroplasty right  Subjective:    Pt reports that her block didn't work well and she started having pain last evening. It is better controlled now and she has passed OT. She feels ready to go home.     Chest pain, SOB:  no  Nausea, vomiting:  no  Lightheadedness, dizziness:  no  Neuro:  Patient denies new onset numbness or paresthesias      Objective:  Blood pressure 127/58, pulse 86, temperature 98.2  F (36.8  C), temperature source Oral, resp. rate 18, height 1.626 m (5' 4\"), weight 91.6 kg (201 lb 15.1 oz), SpO2 96%, not currently breastfeeding.    Patient Vitals for the past 24 hrs:   BP Temp Temp src Pulse Resp SpO2   03/22/24 0033 127/58 98.2  F (36.8  C) Oral 86 18 --   03/21/24 2018 (!) 141/70 97.8  F (36.6  C) Oral 72 17 96 %   03/21/24 1700 130/74 -- -- -- -- --   03/21/24 1600 138/79 -- -- -- -- --   03/21/24 1400 137/67 98.2  F (36.8  C) Temporal 85 15 93 %   03/21/24 1345 (!) 140/80 -- -- 80 16 94 %   03/21/24 1330 128/74 -- -- 74 -- 94 %   03/21/24 1315 133/64 -- -- 72 16 95 %   03/21/24 1300 131/75 97.7  F (36.5  C) Temporal 71 15 94 %   03/21/24 1245 137/69 -- -- 69 21 96 %   03/21/24 1230 137/71 97.7  F (36.5  C) Temporal 69 17 97 %   03/21/24 1215 (!) 141/68 (!) 96.1  F (35.6  C) Temporal 80 13 99 %       Wt Readings from Last 4 Encounters:   03/21/24 91.6 kg (201 lb 15.1 oz)   06/08/23 89.4 kg (197 lb)   05/15/23 91.7 kg (202 lb 2 oz)   04/27/23 92.5 kg (204 lb)       Gen: A&O x 3. NAD. Appears tired, up to the recliner fully dressed  Wound status: Covered, Aquacel is c/d/I   Circulation, motion and sensation: Hand and wrist ROM intact and equal bilaterally; distal upper extremity sensation is intact and equal bilaterally. Fingers are warm and well perfused.   Swelling: mild    Pertinent Labs   Lab Results: personally reviewed.     Recent Labs   Lab Test 03/22/24  0527 " 03/21/24  0749 05/15/23  1043 05/12/22  0902 05/12/22  0902 12/23/21  1436 01/28/21  0958 09/23/20  1537 04/27/20  1419 11/26/19  0936 11/08/19  0915   WBC  --  3.9*  --   --   --   --   --   --   --  4.8 4.0   HGB 10.2* 11.2* 12.3   < > 12.1  --    < >  --   --  12.5 12.2   HCT  --  36.1  --   --   --   --   --   --   --  40.0 38.6   MCV  --  94  --   --   --   --   --   --   --  96 96   PLT  --  238  --   --   --   --   --   --   --  263 235   NA  --   --  139  --  140 143   < > 130*   < > 140 142   CRP  --   --   --   --   --   --   --  <2.9  --   --   --     < > = values in this interval not displayed.       Plan:   Continue current cares and rehabilitation.  Anticoagulation protocol:  mg daily  x 42  days  Pain medications:  oxycodone, tylenol, and Robaxin   Weight bearing status:  NWB  Disposition:  Plan for discharge to home with outpatient therapy when medically stable and pain is controlled, cleared by therapy. Later this AM.              Report completed by:  Vasyl Reyes PA-C  Date: 3/22/2024  Time: 10:40 AM

## 2024-03-22 NOTE — PROGRESS NOTES
Patient vital signs are at baseline: Yes  Patient able to ambulate as they were prior to admission or with assist devices provided by therapies during their stay:  Yes  Patient MUST void prior to discharge:  No,  Reason:  Patient has voided several times as well as ambulated.  Patient able to tolerate oral intake:  Yes  Pain has adequate pain control using Oral analgesics:  No,  Reason:  Patient reported experiencing more pain that she anticipated. Patient has had oral and IV medications as well as cold therapy.  Has goal D/C date and time been discussed with patient:  Yes

## 2024-03-22 NOTE — PLAN OF CARE
"Goal Outcome Evaluation:    Plan of Care Reviewed With: patient, spouse    Problem: Adult Inpatient Plan of Care  Goal: Plan of Care Review    Outcome: Progressing  Flowsheets (Taken 3/21/2024 1936)  Plan of Care Reviewed With:   patient   spouse  Goal: Patient-Specific Goal (Individualized)    Outcome: Progressing  Goal: Absence of Hospital-Acquired Illness or Injury  Outcome: Progressing  Intervention: Identify and Manage Fall Risk  Recent Flowsheet Documentation  Taken 3/21/2024 1815 by Felecia Beavers RN  Safety Promotion/Fall Prevention:   activity supervised   assistive device/personal items within reach   clutter free environment maintained   nonskid shoes/slippers when out of bed   patient and family education   room organization consistent   safety round/check completed   supervised activity  Intervention: Prevent Skin Injury  Recent Flowsheet Documentation  Taken 3/21/2024 1815 by Felecia Beavers RN  Body Position:   position changed independently   supine, head elevated  Intervention: Prevent and Manage VTE (Venous Thromboembolism) Risk  Recent Flowsheet Documentation  Taken 3/21/2024 1815 by Felecia Beavers RN  VTE Prevention/Management: SCDs (sequential compression devices) on  Goal: Optimal Comfort and Wellbeing  Outcome: Progressing  Goal: Readiness for Transition of Care  Outcome: Progressing     Pt A&Ox4, VSS, able to make needs known, utilizing call light appropriately. LR infusing @75ml/hr. Capno remains on. Room Air. SCD's on. Pt tolerating PO intake well. Pt ambulating with A1/SBA. Voiding adequately. Pain well controlled.        Vital signs:  Temp: 98.2  F (36.8  C) Temp src: Temporal BP: 130/74 Pulse: 85   Resp: 15 SpO2: 93 % O2 Device: None (Room air) Oxygen Delivery: 2 LPM Height: 162.6 cm (5' 4\") Weight: 91.6 kg (201 lb 15.1 oz)           "

## 2024-03-22 NOTE — PROGRESS NOTES
Occupational Therapy     03/22/24 0857   Appointment Info   Signing Clinician's Name / Credentials (OT) Corina Rivas, OTR/L   Quick Adds   Quick Adds Certification   Living Environment   People in Home spouse   Current Living Arrangements house   Home Accessibility stairs to enter home   Number of Stairs, Main Entrance 2   Stair Railings, Main Entrance railings safe and in good condition   Living Environment Comments All needs met on main level   Self-Care   Equipment Currently Used at Home grab bar, toilet   Fall history within last six months yes   Number of times patient has fallen within last six months 3   Activity/Exercise/Self-Care Comment Pt reports indep with ADLs, doesn't use walking device at baseline.   Instrumental Activities of Daily Living (IADL)   Previous Responsibilities driving;medication management;finances;yardwork;shopping;laundry;housekeeping;meal prep   General Information   Onset of Illness/Injury or Date of Surgery 03/21/24   Referring Physician Fabian Deras MD   Patient/Family Therapy Goal Statement (OT) pain control, return home   Additional Occupational Profile Info/Pertinent History of Current Problem Marilee Marks is a 79 yo female who presents S/P reverse TSA, RIGHT shoulder.   Existing Precautions/Restrictions fall;shoulder;weight bearing  (right shoulder: NWB, no lifting, int/ext rotation, flexion beyond 90 degrees; sling to be worn at all times with exceptions of bathing, UB dressing, skin checks and exercises)   Right Upper Extremity (Weight-bearing Status) non weight-bearing (NWB)   Cognitive Status Examination   Orientation Status orientation to person, place and time   Cognitive Status Comments Pt A&O x4, able to follow 1-2 step directions, conversation and communicate needs.   Pain Assessment   Patient Currently in Pain Yes, see Vital Sign flowsheet  (Pt reporting right shoulder pain 4/10 at rest, RN notified)   Range of Motion Comprehensive   General Range of Motion  other (see comments)   Comment, General Range of Motion LUE shoulder FF ~90 degrees with pt reporting she also needs surgery on her L shoulder, able to ext rotate to complete pericares; RUE not tested due to post-surgical precautions.   Strength Comprehensive (MMT)   General Manual Muscle Testing (MMT) Assessment other (see comments)   Comment, General Manual Muscle Testing (MMT) Assessment LUE WFL; RUE not tested due to post-surgical precautions.   Bed Mobility   Bed Mobility other (see comments)   Comment (Bed Mobility) Pt reports she will be sleeping in a recliner at home, required Mary to complete supine>sit in hospital bed.   Transfers   Transfers No deficits identified;sit-stand transfer   Sit-Stand Transfer   Sit-Stand [place] (Transfers) supervision;independent   Sit/Stand Transfer Comments Pt completes sit>stand from chair SBA, sturdy upon standing, denies dizziness.   Clinical Impression   Criteria for Skilled Therapeutic Interventions Met (OT) Yes, treatment indicated   OT Diagnosis Decreased ADL indep/safety   Influenced by the following impairments S/P reverse TSA, RIGHT shoulder   OT Problem List-Impairments impacting ADL problems related to;mobility;pain;post-surgical precautions   Assessment of Occupational Performance 1-3 Performance Deficits   Identified Performance Deficits transfers, UB/LB dressing, driving,   Planned Therapy Interventions (OT) ADL retraining;ROM;home program guidelines   Clinical Decision Making Complexity (OT) problem focused assessment/low complexity   Risk & Benefits of therapy have been explained evaluation/treatment results reviewed;care plan/treatment goals reviewed;risks/benefits reviewed;current/potential barriers reviewed;participants voiced agreement with care plan;participants included;patient   OT Total Evaluation Time   OT Eval, Low Complexity Minutes (39061) 15   Therapy Certification   Medical Diagnosis S/P reverse TSA, RIGHT shoulder   Start of Care Date  03/22/24   Certification date from 03/22/24   Certification date to 03/22/24   OT Goals   Therapy Frequency (OT) One time eval and treatment   OT Predicted Duration/Target Date for Goal Attainment 03/22/24   OT Goals Upper Body Dressing;Lower Body Dressing;Transfers;OT Goal 1   OT: Upper Body Dressing Moderate assist;within precautions;Goal Met   OT: Lower Body Dressing Minimal assist;within precautions;Goal Met   OT: Transfer Supervision/stand-by assist;within precautions;Goal Met   OT: Goal 1 Pt will verbalize and demonstrate understanding of HEP to mobilize shoulder within post-surgical precautions.  (Goal Met)   Self-Care/Home Management   Self-Care/Home Mgmt/ADL, Compensatory, Meal Prep Minutes (48736) 15   Symptoms Noted During/After Treatment (Meal Preparation/Planning Training) increased pain   Treatment Detail/Skilled Intervention Pt seated in chair. Education provided on post-surgical shoulder precautions including NWB, lifting, no ext/int rotation, no flexion beyond 90 degrees. Education provided on how to don/doff sling, pt instructed to wear sling at all times with the exceptions of bathing, skin checks, UB dressing and during exercises. Pt verbalizes understanding. Education provided (verbal, demo, handout) on one-arm dressing technique. Pt dons button-up shirt using technique ModA with assist weaving non-surgical arm in and snapping buttons. Provided recommendation to wear loose-fitting clothes to increase indep with dressing. Education provided on LB dressing including pulling undergarments and pants up to knees while seated using unaffected UE prior to standing to pull up. Pt demonstrates understanding dons pants Mary with assist pulling up on posterior right side.   Therapeutic Procedures/Exercise   Therapeutic Procedure: strength, endurance, ROM, flexibillity minutes (81043) 10   Symptoms Noted During/After Treatment none   Treatment Detail/Skilled Intervention Pt seated in chair. Education  provided (verbal, demo, handout) on post-surgical UB exercises for RUE. Pt completes 5 exercises 10x each: elbow flex, forearm sup/pron, wrist flex/ext, wrist uln/rad deviation and fist pumps. Education provided on pendulum exercise, pt demonstrates ability to complete using bed to stabilize LUE. Pt instructed to complete exercises 2x daily, modify or stop if painful, apply ice afterwards.   OT Discharge Planning   OT Plan One time eval & treatrment   OT Discharge Recommendation (DC Rec) home;home with assist   OT Rationale for DC Rec Pt demonstrates completion of ADLs following post-surgical precautions. Pt's  is home to provide assist as needed.   OT Brief overview of current status SBA sit<>stand; Mary LB dressing; ModA UB dressing   Total Session Time   Timed Code Treatment Minutes 25   Total Session Time (sum of timed and untimed services) 40    [facility]  OUTPATIENT OCCUPATIONAL THERAPY  EVALUATION  PLAN OF TREATMENT FOR OUTPATIENT REHABILITATION  (COMPLETE FOR INITIAL CLAIMS ONLY)  Patient's Last Name, First Name, M.I.  YOB: 1945  KendrickMarilee Vernon                          Provider's Name  [facility] Medical Record No.  [id number]                             Onset Date:  03/21/24   Start of Care Date:  03/22/24   Type:     ___PT   _X_OT   ___SLP Medical Diagnosis:  S/P reverse TSA, RIGHT shoulder                    OT Diagnosis:  Decreased ADL indep/safety Visits from SOC:  1     See note for plan of treatment, functional goals and certification details    I CERTIFY THE NEED FOR THESE SERVICES FURNISHED UNDER        THIS PLAN OF TREATMENT AND WHILE UNDER MY CARE     (Physician co-signature of this document indicates review and certification of the therapy plan).            Fabian Deras MD

## 2024-03-22 NOTE — PROGRESS NOTES
"[facility] Medicine Progress Note  Date of Service: 03/22/2024    Assessment & Plan   Marilee Marks is a 78 year old female who presented on 3/21/2024 for scheduled Procedure(s):  Reverse total shoulder arthroplasty by Fabian Deras MD and is being followed by the hospital medicine service for co-management of acute and/or chronic perioperative medical problems.    Rotator cuff tear arthropathy of right shoulder    S/p Procedure(s):  Reverse total shoulder arthroplasty   1 Day Post-Op    - pain control, wound cares, physical therapy, occupational therapy and DVT prophylaxis per orthopedic surgery service  - Hemoglobin 11.2 pre-op and 10.2 post-op due to acute blood loss anemia.    Personal history of DVT (deep vein thrombosis)  Pt reports a hx of DVT after a knee surgery ~10 yrs ago. She states she was prescribed Xarelto, but she didn't take it because \"I was stupid\". Discussed with ortho and will increase DVT prophylaxis to Aspirin 325. Had a shoulder surgery so is able to prevent clots via ambulation.    Hypothyroidism, unspecified type  Managed prior to admission with Levothyroxine 112, continue.    Benign essential hypertension  Managed prior to admission with Amlodipine 10, continue.    Moderate major depression (H)  Managed prior to admission with Bupropion 150, continue.    Stage 3a chronic kidney disease (H)  Cr 0.87 on admission. Received LR 75ml/hr overnight per ortho. Stable.     Hyperlipidemia  Managed prior to admission with Rosuvastatin 20, continue.    Obesity  Contributing to morbidity and mortality. Managed prior to admission with Phentermine, hold.    Osteopenia  Managed prior to admission with Alendronate 70 weekly, hold.    DVT Prophylaxis: as per orthopedic surgery service - Aspirin 325  Code Status: Full Code    Lines: peripheral IV   Gaviria catheter: none    Discussion: Medically, the patient appears to be progressing " appropriately.    Disposition: Anticipate discharge 3/22/24     Attestation:  I have reviewed today's vital signs, notes, medications, labs and imaging.    I have discussed, or will be discussing, the patient with hospitalist physician, Dr. Concha Elkins, [place]       Interval History   Patient feeling well after surgery. Pain level 2/10. Hasn't had a bowel movement but passing flatus.     Tolerating oral intake. Denies abdominal pain, n/v, chest pain, shortness of breath, cough, urinary retention, confusion, fever, chills, numbness/tingling, dizziness.    Physical Exam   Temp:  [96.1  F (35.6  C)-98.2  F (36.8  C)] 98.2  F (36.8  C)  Pulse:  [65-86] 86  Resp:  [13-22] 18  BP: (127-143)/(58-80) 127/58  SpO2:  [93 %-100 %] 96 %    Weights:   Vitals:    03/21/24 0738   Weight: 91.6 kg (201 lb 15.1 oz)    Body mass index is 34.66 kg/m .    General: Awake, alert, and in no apparent distress. Pleasant and conversational, speaking in full sentences.  CV: Regular rhythm & rate. 2/6 holosystolic murmur (not new). 1+ bilateral lower extremity edema (chronic). Peripheral pulses intact.  Respiratory: Clear to auscultation bilaterally, no wheezing, crackles, or rhonchi.  GI: Non-distended, soft, nontender to palpation. No rebound or guarding. Normoactive bowel sounds.  Skin: Warm, dry, no rashes or ecchymoses. No mottling of skin.   Musculoskeletal / extremities: Moves all extremities equally, no obvious abnormalities. Distal CMS intact.  Neurologic: No focal deficits. A&Ox4    Data   Recent Labs   Lab 03/22/24  0527 03/21/24  0749 03/21/24  0745   WBC  --  3.9*  --    HGB 10.2* 11.2*  --    MCV  --  94  --    PLT  --  238  --    POTASSIUM  --  3.8  --    CR  --  0.87  --    *  --  80       Recent Labs   Lab 03/22/24  0527 03/21/24  0745   * 80        Unresulted Labs Ordered in the Past 30 Days of this Admission       No orders found for last 31 day(s).             Imaging  Recent Results (from the past  24 hour(s))   XR Shoulder Right Port G/E 2 Views    Narrative    SHOULDER RIGHT PORTABLE TWO OR MORE VIEWS March 21, 2024 1:07 PM     HISTORY: Status post surgery.    COMPARISON: None.      Impression    IMPRESSION: Reverse total shoulder arthroplasty. Excellent  position/alignment. There is no evidence of complication or  periprosthetic fracture. Subacromial enthesophyte and diffuse bone  demineralization incidentally noted.    YUKI MATHEW MD         SYSTEM ID:  [id number]        I reviewed all new labs and imaging results over the last 24 hours. I personally reviewed no images or EKG's today.    Medications    lactated ringers Stopped (03/22/24 0400)      acetaminophen  975 mg Oral Q8H    amLODIPine  10 mg Oral At Bedtime    aspirin  81 mg Oral BID    buPROPion  150 mg Oral Daily    famotidine  20 mg Oral Q24H    Or    famotidine  20 mg Intravenous Q24H    levothyroxine  112 mcg Oral Daily    polyethylene glycol  17 g Oral Daily    rosuvastatin  20 mg Oral At Bedtime    senna-docusate  1 tablet Oral BID    sodium chloride (PF)  3 mL Intracatheter Q8H    topiramate  50 mg Oral BID     Hair Elkins PA-C

## 2024-03-22 NOTE — PROGRESS NOTES
[facility] DISCHARGE NOTE    Patient discharged to home at 11:30 AM via wheel chair. Accompanied by spouse and staff. Discharge instructions reviewed with patient and spouse, opportunity offered to ask questions. Prescriptions sent to patients preferred pharmacy. All belongings sent with patient.    Mayra Recinos RN

## 2024-03-22 NOTE — DISCHARGE SUMMARY
[facility] Orthopedics Discharge Summary                                  [facility]     BARB WILEY [id number]   Age: 78 year old  PCP: Tomasa Rios, [phone] 1945     Date of Admission:  3/21/2024  Date of Discharge::  3/22/2024 11:05 AM  Discharge Physician:  Vasyl Reyes PA-C    Code status:  Full Code    Admission Information:  Admission Diagnosis:  Infraspinatus tendon tear [S46.819A]  Tear of right supraspinatus tendon [M75.101]  Rotator cuff tear arthropathy of right shoulder [M75.101, M12.811]    Post-Operative Day: 1 Day Post-Op     Reason for admission:  The patient was admitted for the following:Procedure(s) (LRB):  Reverse total shoulder arthroplasty (Right)    Principal Problem:    Rotator cuff tear arthropathy of right shoulder  Active Problems:    BRUCE (obstructive sleep apnea)    Hypothyroidism, unspecified type    Benign essential hypertension    Personal history of DVT (deep vein thrombosis)    Moderate major depression (H)    Stage 3a chronic kidney disease (H)      Allergies:  Codeine and Lisinopril    Following the procedure noted above the patient was transferred to the post-op floor and started on:    Therapy:  occupational therapy  Anticoagulation Plan:  mg daily  for 42 days  Pain Management: oxycodone, tylenol, and Robaxin  Weight bearing status: Non-weight bearing     The patient was followed and co-managed by the hospitalist service during the inpatient treatment course  Complications:  None  Consultations:  None     Pertinent Labs   Lab Results: personally reviewed.     Recent Labs   Lab Test 03/22/24  0527 03/21/24  0749 05/15/23  1043 05/12/22  0902 05/12/22  0902 12/23/21  1436 01/28/21  0958 09/23/20  1537 04/27/20  1419 11/26/19  0936 11/08/19  0915   WBC  --  3.9*  --   --   --   --   --   --   --  4.8 4.0   HGB 10.2* 11.2* 12.3   < > 12.1  --    < >  --   --  12.5 12.2   HCT  --  36.1  --   --   --   --   --   --   --  40.0 38.6    MCV  --  94  --   --   --   --   --   --   --  96 96   PLT  --  238  --   --   --   --   --   --   --  263 235   NA  --   --  139  --  140 143   < > 130*   < > 140 142   CRP  --   --   --   --   --   --   --  <2.9  --   --   --     < > = values in this interval not displayed.          Discharge Information:  Condition at discharge: Stable  Discharge destination:  Discharged to home     Medications at discharge:  Current Discharge Medication List        START taking these medications    Details   acetaminophen (TYLENOL) 325 MG tablet Take 2 tablets (650 mg) by mouth every 4 hours as needed for other (mild pain)    Associated Diagnoses: History of reverse total replacement of right shoulder joint      aspirin (ASA) 325 MG EC tablet Take 1 tablet (325 mg) by mouth daily  Qty: 30 tablet, Refills: 0    Associated Diagnoses: History of reverse total replacement of right shoulder joint      methocarbamol (ROBAXIN) 500 MG tablet Take 1 tablet (500 mg) by mouth every 6 hours as needed for muscle spasms  Qty: 30 tablet, Refills: 0    Associated Diagnoses: History of reverse total replacement of right shoulder joint      oxyCODONE (ROXICODONE) 5 MG tablet Take 1-2 tablets (5-10 mg) by mouth every 4 hours as needed for moderate to severe pain  Qty: 26 tablet, Refills: 0    Associated Diagnoses: History of reverse total replacement of right shoulder joint      senna-docusate (SENOKOT-S/PERICOLACE) 8.6-50 MG tablet Take 1-2 tablets by mouth 2 times daily Take while on oral narcotics to prevent or treat constipation.    Comments: While taking narcotics  Associated Diagnoses: History of reverse total replacement of right shoulder joint           CONTINUE these medications which have NOT CHANGED    Details   alendronate (FOSAMAX) 70 MG tablet Take 70 mg by mouth every 7 days      amLODIPine (NORVASC) 10 MG tablet Take 10 mg by mouth daily      Bacillus Coagulans-Inulin (PROBIOTIC) 1-250 BILLION-MG CAPS Take 1 capsule by mouth  daily      buPROPion (WELLBUTRIN XL) 150 MG 24 hr tablet TAKE 1 TABLET EVERY MORNING  Qty: 90 tablet, Refills: 3    Associated Diagnoses: Moderate major depression (H); Adjustment disorder with mixed anxiety and depressed mood      celecoxib (CELEBREX) 200 MG capsule Take 1 capsule (200 mg) by mouth daily  Qty: 90 capsule, Refills: 3    Associated Diagnoses: Primary osteoarthritis, unspecified site      CENTRUM SILVER PO TABS Take one tablet by mouth every day  Refills: 3    Associated Diagnoses: Preop general physical exam; Hypothyroidism; Gallbladder & bile duct stone with obstruction; Obesity      levothyroxine (SYNTHROID/LEVOTHROID) 112 MCG tablet Take 1 tablet (112 mcg) by mouth daily  Qty: 90 tablet, Refills: 3    Associated Diagnoses: Hypothyroidism, unspecified type      magnesium 250 MG tablet Take 1 tablet by mouth daily      phentermine (ADIPEX-P) 15 MG capsule Take 1 capsule (15 mg) by mouth every morning  Qty: 30 capsule, Refills: 5    Associated Diagnoses: BMI 35.0-35.9,adult      rosuvastatin (CRESTOR) 20 MG tablet Take 1 tablet by mouth at bedtime      topiramate (TOPAMAX) 50 MG tablet Take 1 tablet (50 mg) by mouth 2 times daily  Qty: 180 tablet, Refills: 3    Associated Diagnoses: BMI 35.0-35.9,adult      VITAMIN D3 1000 UNIT PO CAPS Take 1 capsule by mouth daily         Fabian Deras MD                Follow-Up Care:  Patient should be seen in the office in 14 days by the Orthopedic Surgeon/Physician Assistant.  Call [phone] for appointment or questions.    Vasyl Reyes PA-C

## 2024-04-03 ENCOUNTER — TRANSFERRED RECORDS (OUTPATIENT)
Dept: HEALTH INFORMATION MANAGEMENT | Facility: CLINIC | Age: 79
End: 2024-04-03
Payer: COMMERCIAL

## 2024-04-03 ASSESSMENT — ACTIVITIES OF DAILY LIVING (ADL)
REMOVING_SOMETHING_FROM_YOUR_BACK_POCKET: 8
PLACING_AN_OBJECT_ON_A_HIGH_SHELF: 9
PUTTING_ON_AN_UNDERSHIRT_OR_A_PULLOVER_SWEATER: 6
REACHING_FOR_SOMETHING_ON_A_HIGH_SHELF: 10
AT_ITS_WORST?: 5
CARRYING_A_HEAVY_OBJECT_OF_10_POUNDS: 9
PUTTING_ON_YOUR_PANTS: 6
WASHING_YOUR_BACK: 8
TOUCHING_THE_BACK_OF_YOUR_NECK: 10
WASHING_YOUR_HAIR?: 8
PUTTING_ON_A_SHIRT_THAT_BUTTONS_DOWN_THE_FRONT: 6
PLEASE_INDICATE_YOR_PRIMARY_REASON_FOR_REFERRAL_TO_THERAPY:: SHOULDER
PUSHING_WITH_THE_INVOLVED_ARM: 9
WHEN_LYING_ON_THE_INVOLVED_SIDE: 7

## 2024-04-04 ENCOUNTER — TRANSCRIBE ORDERS (OUTPATIENT)
Dept: OTHER | Age: 79
End: 2024-04-04

## 2024-04-04 DIAGNOSIS — Z96.611 STATUS POST TOTAL SHOULDER ARTHROPLASTY, RIGHT: Primary | ICD-10-CM

## 2024-04-05 ENCOUNTER — THERAPY VISIT (OUTPATIENT)
Dept: PHYSICAL THERAPY | Facility: CLINIC | Age: 79
End: 2024-04-05
Attending: ORTHOPAEDIC SURGERY
Payer: COMMERCIAL

## 2024-04-05 DIAGNOSIS — Z96.611 S/P REVERSE TOTAL SHOULDER ARTHROPLASTY, RIGHT: Primary | ICD-10-CM

## 2024-04-05 PROCEDURE — 97110 THERAPEUTIC EXERCISES: CPT | Mod: GP | Performed by: PHYSICAL THERAPIST

## 2024-04-05 PROCEDURE — 97161 PT EVAL LOW COMPLEX 20 MIN: CPT | Mod: GP | Performed by: PHYSICAL THERAPIST

## 2024-04-05 NOTE — PROGRESS NOTES
"PHYSICAL THERAPY EVALUATION  Type of Visit: Evaluation    See electronic medical record for Abuse and Falls Screening details.    Subjective R TSA 3/21/24. Fell last summer hurt R shoulder more. Has not been able to raise arm since then. L shoulder is bad too, needs that one done as well.      Presenting condition or subjective complaint: Shoulder replacement therapy  Date of onset: 03/21/24    Relevant medical history: Arthritis; Depression; High blood pressure; Overweight; Thyroid problems   Dates & types of surgery:      Prior diagnostic imaging/testing results: MRI; X-ray     Prior therapy history for the same diagnosis, illness or injury: No      Prior Level of Function  Transfers: Independent  Ambulation: Independent  ADL: Independent  IADL: Driving, Laundry, Meal preparation, Yard work    Living Environment  Social support: With a significant other or spouse   Type of home: House   Stairs to enter the home: Yes 3 Is there a railing: Yes   Ramp:     Stairs inside the home: No       Help at home: Home management tasks (cooking, cleaning)  Equipment owned:       Employment: No    Hobbies/Interests: Crafting, scap books, photos, garden, cooks, shops, drives    Patient goals for therapy: USE MY ARM    Pain assessment:      Objective   SHOULDER EVALUATION  PAIN: no pain at rest, 3-7/10  INTEGUMENTARY (edema, incisions): approx 3\" horizontal incision healing well  POSTURE: Sitting Posture: Forward head, Thoracic kyphosis increased    ROM: PROM flex 80*, abd 60*, ER 25*    STRENGTH:   FLEXIBILITY:   SPECIAL TESTS:   PALPATION:   JOINT MOBILITY:   CERVICAL SCREEN:     Assessment & Plan   CLINICAL IMPRESSIONS  Medical Diagnosis: RCR    Treatment Diagnosis: decreased shoulder ROM and strength post sugery   Impression/Assessment: Patient is a 78 year old female with shoulder complaints.  The following significant findings have been identified: Pain, Decreased ROM/flexibility, and Decreased strength. These impairments " interfere with their ability to perform self care tasks, recreational activities, household chores, and driving  as compared to previous level of function.     Clinical Decision Making (Complexity):  Clinical Presentation: Stable/Uncomplicated  Clinical Presentation Rationale: based on medical and personal factors listed in PT evaluation  Clinical Decision Making (Complexity): Low complexity    PLAN OF CARE  Treatment Interventions:  Interventions: Manual Therapy, Neuromuscular Re-education, Therapeutic Activity, Therapeutic Exercise, Self-Care/Home Management    Long Term Goals     PT Goal 1  Goal Identifier: 1  Goal Description: pt will be able to dress, do hair with ease in 6wks  Target Date: 05/17/24  PT Goal 2  Goal Identifier: 2  Goal Description: pt will be able to reach lowest shelf in cupboard in 8wks  Target Date: 05/31/24  PT Goal 3  Goal Identifier: 3  Goal Description: able to do gardening by summer  Target Date: 05/31/24      Frequency of Treatment: 2x/wk  Duration of Treatment: 4wks, then 1x/wk x4wk    Recommended Referrals to Other Professionals:   Education Assessment:   Learner/Method: Patient;Listening;No Barriers to Learning;Pictures/Video    Risks and benefits of evaluation/treatment have been explained.   Patient/Family/caregiver agrees with Plan of Care.     Evaluation Time:             Signing Clinician: Kris Hoenk, PT      [facility]                                                                                   OUTPATIENT PHYSICAL THERAPY      PLAN OF TREATMENT FOR OUTPATIENT REHABILITATION   Patient's Last Name, First Name, Marilee Bond YOB: 1945   Provider's Name   [facility]   Medical Record No.  [id number]     Onset Date: 03/21/24  Start of Care Date: 04/05/24     Medical Diagnosis:  RCR      PT Treatment Diagnosis:  decreased shoulder ROM and strength post sugery Plan of  Treatment  Frequency/Duration: 2x/wk/ 4wks, then 1x/wk x4wk    Certification date from 04/05/24 to 05/31/24         See note for plan of treatment details and functional goals     Kris Hoenk, PT                         I CERTIFY THE NEED FOR THESE SERVICES FURNISHED UNDER        THIS PLAN OF TREATMENT AND WHILE UNDER MY CARE .             Physician Signature               Date    X_____________________________________________________                  Referring Provider:  Fabian Deras    Initial Assessment  See Epic Evaluation- Start of Care Date: 04/05/24

## 2024-04-09 ENCOUNTER — THERAPY VISIT (OUTPATIENT)
Dept: PHYSICAL THERAPY | Facility: CLINIC | Age: 79
End: 2024-04-09
Attending: ORTHOPAEDIC SURGERY
Payer: COMMERCIAL

## 2024-04-09 DIAGNOSIS — Z96.611 S/P REVERSE TOTAL SHOULDER ARTHROPLASTY, RIGHT: Primary | ICD-10-CM

## 2024-04-09 PROCEDURE — 97110 THERAPEUTIC EXERCISES: CPT | Mod: GP | Performed by: PHYSICAL THERAPIST

## 2024-04-11 ENCOUNTER — THERAPY VISIT (OUTPATIENT)
Dept: PHYSICAL THERAPY | Facility: CLINIC | Age: 79
End: 2024-04-11
Attending: ORTHOPAEDIC SURGERY
Payer: COMMERCIAL

## 2024-04-11 DIAGNOSIS — Z96.611 S/P REVERSE TOTAL SHOULDER ARTHROPLASTY, RIGHT: Primary | ICD-10-CM

## 2024-04-11 PROCEDURE — 97110 THERAPEUTIC EXERCISES: CPT | Mod: GP | Performed by: PHYSICAL THERAPIST

## 2024-04-16 ENCOUNTER — THERAPY VISIT (OUTPATIENT)
Dept: PHYSICAL THERAPY | Facility: CLINIC | Age: 79
End: 2024-04-16
Attending: ORTHOPAEDIC SURGERY
Payer: COMMERCIAL

## 2024-04-16 DIAGNOSIS — Z96.611 S/P REVERSE TOTAL SHOULDER ARTHROPLASTY, RIGHT: Primary | ICD-10-CM

## 2024-04-16 PROCEDURE — 97110 THERAPEUTIC EXERCISES: CPT | Mod: GP | Performed by: PHYSICAL THERAPIST

## 2024-04-18 ENCOUNTER — THERAPY VISIT (OUTPATIENT)
Dept: PHYSICAL THERAPY | Facility: CLINIC | Age: 79
End: 2024-04-18
Attending: ORTHOPAEDIC SURGERY
Payer: COMMERCIAL

## 2024-04-18 DIAGNOSIS — Z96.611 S/P REVERSE TOTAL SHOULDER ARTHROPLASTY, RIGHT: Primary | ICD-10-CM

## 2024-04-18 PROCEDURE — 97110 THERAPEUTIC EXERCISES: CPT | Mod: GP | Performed by: PHYSICAL THERAPIST

## 2024-04-24 ENCOUNTER — THERAPY VISIT (OUTPATIENT)
Dept: PHYSICAL THERAPY | Facility: CLINIC | Age: 79
End: 2024-04-24
Attending: ORTHOPAEDIC SURGERY
Payer: COMMERCIAL

## 2024-04-24 DIAGNOSIS — Z96.611 S/P REVERSE TOTAL SHOULDER ARTHROPLASTY, RIGHT: Primary | ICD-10-CM

## 2024-04-24 PROCEDURE — 97110 THERAPEUTIC EXERCISES: CPT | Mod: GP | Performed by: PHYSICAL THERAPIST

## 2024-04-26 ENCOUNTER — THERAPY VISIT (OUTPATIENT)
Dept: PHYSICAL THERAPY | Facility: CLINIC | Age: 79
End: 2024-04-26
Attending: ORTHOPAEDIC SURGERY
Payer: COMMERCIAL

## 2024-04-26 DIAGNOSIS — Z96.611 S/P REVERSE TOTAL SHOULDER ARTHROPLASTY, RIGHT: Primary | ICD-10-CM

## 2024-04-26 PROCEDURE — 97110 THERAPEUTIC EXERCISES: CPT | Mod: GP | Performed by: PHYSICAL THERAPIST

## 2024-04-30 ENCOUNTER — THERAPY VISIT (OUTPATIENT)
Dept: PHYSICAL THERAPY | Facility: CLINIC | Age: 79
End: 2024-04-30
Attending: ORTHOPAEDIC SURGERY
Payer: COMMERCIAL

## 2024-04-30 DIAGNOSIS — Z96.611 S/P REVERSE TOTAL SHOULDER ARTHROPLASTY, RIGHT: Primary | ICD-10-CM

## 2024-04-30 PROCEDURE — 97110 THERAPEUTIC EXERCISES: CPT | Mod: GP | Performed by: PHYSICAL THERAPIST

## 2024-04-30 NOTE — PROGRESS NOTES
PHYSICAL THERAPY PROGRESS NOTE  Dr Deras   04/30/24 0500   Appointment Info   Signing clinician's name / credentials Kris Hoenk, PT   Visits Used 8  seen from 4/5-4/30   Medical Diagnosis R reverse TSA   PT Tx Diagnosis decreased shoulder ROM and strength post sugery   Quick Adds Certification   Progress Note/Certification   Start of Care Date 04/05/24   Onset of illness/injury or Date of Surgery 03/21/24   Therapy Frequency 2x/wk   Predicted Duration 4wks, then 1x/wk x4wk   Certification date from 04/05/24   Certification date to 05/31/24   Progress Note Completed Date 04/05/24   GOALS   PT Goals 2;3   PT Goal 1   Goal Identifier 1   Goal Description pt will be able to dress, do hair with ease in 6wks   Goal Progress can pull up underwear/pants easier   Target Date 05/17/24   PT Goal 2   Goal Identifier 2   Goal Description pt will be able to reach lowest shelf in cupboard in 8wks   Goal Progress states she can, AROM flex 70*   Target Date 05/31/24   PT Goal 3   Goal Identifier 3   Goal Description able to do gardening by summer   Target Date 05/31/24   Subjective Report   Subjective Report sees MD tomorrow, wants cortisone shot in L shoulder, R decreased in achiness   Objective Measures   Objective Measures Objective Measure 1   Objective Measure 1   Details PROM flex 100* if pt able to relax, abd 80*, ER 30*   Treatment Interventions (PT)   Interventions Therapeutic Procedure/Exercise   Therapeutic Procedure/Exercise   Therapeutic Procedures: strength, endurance, ROM, flexibility minutes (06859) 25   Ther Proc 1 - Details codmans warm up, felxion slide on counter, flexion walk backward to stretch on counter, scap sets 5sec x5, PROM flex, scap, ER   Skilled Intervention PROM, shoulder protocol, able to progress to AAROM as the pt is at 4 weeks   Patient Response/Progress pain end ranges, ROM slow   Education   Learner/Method Patient;Listening;No Barriers to Learning;Pictures/Video   Plan   Home program ex listed    Plan for next session cont 1x/wk per pt request, shld sore   Total Session Time   Timed Code Treatment Minutes 25   Total Treatment Time (sum of timed and untimed services) 25     [facility]  Kris Hoenk  PT  [facility]  [address] [place] [address]  [email or web address]   Office: [phone]  Voicemail: [phone]

## 2024-05-01 ENCOUNTER — MEDICAL CORRESPONDENCE (OUTPATIENT)
Dept: HEALTH INFORMATION MANAGEMENT | Facility: CLINIC | Age: 79
End: 2024-05-01

## 2024-05-01 ENCOUNTER — TRANSFERRED RECORDS (OUTPATIENT)
Dept: HEALTH INFORMATION MANAGEMENT | Facility: CLINIC | Age: 79
End: 2024-05-01
Payer: COMMERCIAL

## 2024-05-08 ENCOUNTER — THERAPY VISIT (OUTPATIENT)
Dept: PHYSICAL THERAPY | Facility: CLINIC | Age: 79
End: 2024-05-08
Attending: ORTHOPAEDIC SURGERY
Payer: COMMERCIAL

## 2024-05-08 DIAGNOSIS — Z96.611 S/P REVERSE TOTAL SHOULDER ARTHROPLASTY, RIGHT: Primary | ICD-10-CM

## 2024-05-08 PROCEDURE — 97110 THERAPEUTIC EXERCISES: CPT | Mod: GP | Performed by: PHYSICAL THERAPIST

## 2024-05-15 ENCOUNTER — THERAPY VISIT (OUTPATIENT)
Dept: PHYSICAL THERAPY | Facility: CLINIC | Age: 79
End: 2024-05-15
Attending: ORTHOPAEDIC SURGERY
Payer: COMMERCIAL

## 2024-05-15 DIAGNOSIS — Z96.611 S/P REVERSE TOTAL SHOULDER ARTHROPLASTY, RIGHT: Primary | ICD-10-CM

## 2024-05-15 PROCEDURE — 97110 THERAPEUTIC EXERCISES: CPT | Mod: GP | Performed by: PHYSICAL THERAPIST

## 2024-05-22 ENCOUNTER — THERAPY VISIT (OUTPATIENT)
Dept: PHYSICAL THERAPY | Facility: CLINIC | Age: 79
End: 2024-05-22
Attending: ORTHOPAEDIC SURGERY
Payer: COMMERCIAL

## 2024-05-22 DIAGNOSIS — Z96.611 S/P REVERSE TOTAL SHOULDER ARTHROPLASTY, RIGHT: Primary | ICD-10-CM

## 2024-05-22 PROCEDURE — 97110 THERAPEUTIC EXERCISES: CPT | Mod: GP | Performed by: PHYSICAL THERAPIST

## 2024-05-29 ENCOUNTER — THERAPY VISIT (OUTPATIENT)
Dept: PHYSICAL THERAPY | Facility: CLINIC | Age: 79
End: 2024-05-29
Attending: ORTHOPAEDIC SURGERY
Payer: COMMERCIAL

## 2024-05-29 DIAGNOSIS — Z96.611 S/P REVERSE TOTAL SHOULDER ARTHROPLASTY, RIGHT: Primary | ICD-10-CM

## 2024-05-29 PROCEDURE — 97110 THERAPEUTIC EXERCISES: CPT | Mod: GP | Performed by: PHYSICAL THERAPIST

## 2024-05-29 NOTE — PROGRESS NOTES
PHYSICAL THERAPY DISCHARGE  Pt did not attend any further visits, final status as below    [facility]                                                                                   OUTPATIENT PHYSICAL THERAPY    PLAN OF TREATMENT FOR OUTPATIENT REHABILITATION   Patient's Last Name, First Name, Marilee Bond YOB: 1945   Provider's Name   [facility]   Medical Record No.  [id number]     Onset Date: 03/21/24  Start of Care Date: 04/05/24     Medical Diagnosis:  R reverse TSA      PT Treatment Diagnosis:  decreased shoulder ROM and strength post sugery Plan of Treatment  Frequency/Duration: (P) 1x per 3 wks/ (P) 6wk    Certification date from 05/31/24 to (P) 07/12/24         See note for plan of treatment details and functional goals     Kris Hoenk, PT                         I CERTIFY THE NEED FOR THESE SERVICES FURNISHED UNDER        THIS PLAN OF TREATMENT AND WHILE UNDER MY CARE .             Physician Signature               Date    X_____________________________________________________                  Referring Provider:  Fabian Deras    Initial Assessment  See Epic Evaluation- Start of Care Date: 04/05/24 05/29/24 0500   Appointment Info   Signing clinician's name / credentials Kris Hoenk, PT   Visits Used 12 note down visit 8   Medical Diagnosis R reverse TSA   PT Tx Diagnosis decreased shoulder ROM and strength post sugery   Quick Adds Certification   Progress Note/Certification   Start of Care Date 04/05/24   Onset of illness/injury or Date of Surgery 03/21/24   Therapy Frequency 1x per 3 wks   Predicted Duration 6wk   Certification date from 05/31/24   Certification date to 07/12/24   Progress Note Completed Date 04/30/24   GOALS   PT Goals 2;3;4   PT Goal 1   Goal Identifier 1   Goal Description pt will be able to dress, do hair with ease in 6wks   Goal Progress getting easier, can reach hair    Target Date 05/17/24   Date Met 05/29/24   PT Goal 2   Goal Identifier 2   Goal Description pt will be able to reach lowest shelf in cupboard in 8wks   Goal Progress states she can, AROM flex 90*   Target Date 05/31/24   Date Met 05/15/24   PT Goal 3   Goal Identifier 3 extend to 7/12   Goal Description able to do gardening by summer   Goal Progress starting to do some weeding, planting, everything, does hurt some   Target Date 05/31/24   PT Goal 4   Goal Identifier 4   Goal Description will be able ot full participate in water aerobics for general health and social interactions in 6wk   Target Date 07/12/24   Subjective Report   Subjective Report not as sharp, but can still feel front of shoulder, can sleep on R a little, will start water aerobics next week, R is feeling better than L   Objective Measures   Objective Measures Objective Measure 1   Objective Measure 1   Objective Measure suspect bicep tendon tore??   Details AROM flex 90*, abd 70*, IR to pocket (barely)   Treatment Interventions (PT)   Interventions Therapeutic Procedure/Exercise   Therapeutic Procedure/Exercise   Therapeutic Procedures: strength, endurance, ROM, flexibility minutes (74078) 25   Ther Proc 1 - Details active flex x5, red TB tricep extension, demo vs shld ext, bicep curl 2# x10, 3# x5 PROM R shld flex, abd, ER, to only do ROM next few days, ice to calm down sx   Skilled Intervention review HEP, strength, ROM   Education   Learner/Method Patient;Listening;No Barriers to Learning;Pictures/Video   Plan   Home program ex listed   Plan for next session pt wish to cont with water aerobics couple weeks, return for recheck every 3wks x2 sessions   Total Session Time   Timed Code Treatment Minutes 25   Total Treatment Time (sum of timed and untimed services) 25     [facility]  Kris Hoenk  PT  [facility]  [address] [place] [address]  [email or web address]  [email or web address]   Office: [phone]   [facility]: [phone]

## 2024-06-15 ENCOUNTER — HEALTH MAINTENANCE LETTER (OUTPATIENT)
Age: 79
End: 2024-06-15

## 2024-08-03 DIAGNOSIS — E03.9 HYPOTHYROIDISM, UNSPECIFIED TYPE: ICD-10-CM

## 2024-08-05 RX ORDER — LEVOTHYROXINE SODIUM 112 UG/1
112 TABLET ORAL DAILY
Qty: 90 TABLET | Refills: 0 | OUTPATIENT
Start: 2024-08-05

## 2024-08-16 ENCOUNTER — TRANSCRIBE ORDERS (OUTPATIENT)
Dept: OTHER | Age: 79
End: 2024-08-16

## 2024-08-16 DIAGNOSIS — Z96.612 S/P REVERSE TOTAL SHOULDER ARTHROPLASTY, LEFT: Primary | ICD-10-CM

## 2024-08-27 ENCOUNTER — ANESTHESIA EVENT (OUTPATIENT)
Dept: SURGERY | Facility: CLINIC | Age: 79
End: 2024-08-27
Payer: COMMERCIAL

## 2024-08-27 ASSESSMENT — LIFESTYLE VARIABLES: TOBACCO_USE: 0

## 2024-08-27 NOTE — ANESTHESIA PREPROCEDURE EVALUATION
Anesthesia Pre-Procedure Evaluation    Patient: Marilee Marks   MRN: [id number] : 1945        Procedure : Procedure(s):  Reverse Total Shoulder Arthroplasty          Past Medical History:   Diagnosis Date    Benign essential hypertension     Depressive disorder     HTN, goal below 140/90     Hyperlipidemia     Hypothyroidism     Hypothyroidism, unspecified type     LBBB (left bundle branch block) 2021    Right knee DJD     Spigelian hernia 2011      Past Surgical History:   Procedure Laterality Date    ARTHROPLASTY KNEE Right 10/30/2014    Procedure: ARTHROPLASTY KNEE;  Surgeon: Fabian Deras MD;  Location: [facility] OR    ARTHROSCOPY KNEE Right 2015    Procedure: ARTHROSCOPY KNEE;  Surgeon: Fabian Deras MD;  Location: [facility] OR    BREAST LUMPECTOMY, RT/LT      LT non-ca    C/SECTION, CLASSICAL  1973    COLONOSCOPY  2007    repeat in 10 years    COSMETIC SURGERY      IR MISCELLANEOUS PROCEDURE  2009    LAPAROSCOPIC HERNIORRHAPHY VENTRAL  2011    Procedure:LAPAROSCOPIC HERNIORRHAPHY VENTRAL; Laparoscopic Left Spigelian Herniorraphy With Mesh & Repair of Left Lower Quadrant Hernia With  Mesh; Surgeon:TODD PHAN; Location:[facility] OR    MAMMOPLASTY REDUCTION BILATERAL Bilateral 2022    Procedure: MAMMOPLASTY, REDUCTION, BILATERAL;  Surgeon: FELIX Madrigal MD;  Location: [facility]    REVERSE ARTHROPLASTY SHOULDER Right 3/21/2024    Procedure: Reverse total shoulder arthroplasty;  Surgeon: Fabian Deras MD;  Location: [facility] OR    SURGICAL HISTORY OF -   2009    Left Heart Cath, Left Ventriculography, Coronary Angiogram, Closure [place], [place]    TUBAL LIGATION      ZZHC REVISION GASTROPLASTY,OBESITY, NON-OBDULIA RESTRICT DEVICE  1979      Allergies   Allergen Reactions    Codeine Itching    Lisinopril Cough      Social History     Tobacco Use    Smoking status: Never    Smokeless tobacco: Never   Substance Use Topics    Alcohol use: Not Currently       Wt Readings from Last 1 Encounters:   03/21/24 91.6 kg (201 lb 15.1 oz)        Anesthesia Evaluation   Pt has had prior anesthetic. Type: General and MAC.        ROS/MED HX  ENT/Pulmonary:     (+) sleep apnea,                                    (-) tobacco use   Neurologic:  - neg neurologic ROS     Cardiovascular:     (+) Dyslipidemia hypertension- -   -  - -                                 Previous cardiac testing   Echo: Date: 11/29/21 Results:  Reason For Study: LBBB (left bundle branch block)    Interpretation Summary     Left ventricular systolic function is normal. The visual ejection fraction is  60-65%. No regional wall motion abnormalities noted.  The right ventricular systolic function is normal.  Normal left atrial size. Right atrial size is normal.  Mild mitral and mild-moderate tricuspid regurgitaiton.  No prior study.    Stress Test:  Date: Results:    ECG Reviewed:  Date: 11/29/21 Results:  Sinus  Rhythm   -Left bundle branch block.   Cath:  Date: Results:      METS/Exercise Tolerance:     Hematologic:     (+) History of blood clots,    pt is not anticoagulated,           Musculoskeletal:   (+)  arthritis,             GI/Hepatic:  - neg GI/hepatic ROS     Renal/Genitourinary:     (+) renal disease (stage 3a), type: CRI,            Endo:     (+)          thyroid problem, hypothyroidism,    Obesity,       Psychiatric/Substance Use:     (+) psychiatric history depression       Infectious Disease:       Malignancy:  - neg malignancy ROS     Other:  - neg other ROS          Physical Exam    Airway        Mallampati: II   TM distance: > 3 FB   Neck ROM: full     Respiratory Devices and Support         Dental       (+) Minor Abnormalities - some fillings, tiny chips      Cardiovascular   cardiovascular exam normal       Rhythm and rate: regular and normal     Pulmonary   pulmonary exam normal        breath sounds clear to auscultation           OUTSIDE LABS:  CBC:   Lab Results   Component Value Date  "   WBC 3.9 (L) 03/21/2024    WBC 4.8 11/26/2019    HGB 10.2 (L) 03/22/2024    HGB 11.2 (L) 03/21/2024    HCT 36.1 03/21/2024    HCT 40.0 11/26/2019     03/21/2024     11/26/2019     BMP:   Lab Results   Component Value Date     05/15/2023     05/12/2022    POTASSIUM 3.8 03/21/2024    POTASSIUM 5.0 05/15/2023    CHLORIDE 107 05/15/2023    CHLORIDE 111 (H) 05/12/2022    CO2 23 05/15/2023    CO2 26 05/12/2022    BUN 24.4 (H) 05/15/2023    BUN 20 05/12/2022    CR 0.87 03/21/2024    CR 0.93 05/15/2023     (H) 03/22/2024    GLC 80 03/21/2024     COAGS: No results found for: \"PTT\", \"INR\", \"FIBR\"  POC: No results found for: \"BGM\", \"HCG\", \"HCGS\"  HEPATIC:   Lab Results   Component Value Date    ALBUMIN 3.7 10/04/2019    PROTTOTAL 6.7 (L) 10/04/2019    ALT 32 11/08/2019    AST 17 11/08/2019    ALKPHOS 108 10/04/2019    BILITOTAL 0.6 10/04/2019     OTHER:   Lab Results   Component Value Date    OPAL 10.6 (H) 05/15/2023    PHOS 3.0 06/29/2015    MAG 2.3 10/04/2019    TSH 0.63 05/15/2023    T4 1.24 05/12/2022    CRP <2.9 09/23/2020    SED 17 06/29/2015       Anesthesia Plan    ASA Status:  3    NPO Status:  NPO Appropriate    Anesthesia Type: General.     - Airway: ETT   Induction: Intravenous, Propofol.   Maintenance: TIVA.        Consents    Anesthesia Plan(s) and associated risks, benefits, and realistic alternatives discussed. Questions answered and patient/representative(s) expressed understanding.     - Discussed: Risks, Benefits and Alternatives for BOTH SEDATION and the PROCEDURE were discussed     - Discussed with:  Patient      - Extended Intubation/Ventilatory Support Discussed: No.      - Patient is DNR/DNI Status: No     Use of blood products discussed: Yes.     - Discussed with: Patient.     - Consented: consented to blood products     Postoperative Care    Pain management: IV analgesics, Oral pain medications, Peripheral nerve block (Single Shot), Multi-modal analgesia.   PONV " prophylaxis: Ondansetron (or other 5HT-3), Dexamethasone or Solumedrol     Comments:               ALANIS Muir CRNA    I have reviewed the pertinent notes and labs in the chart from the past 30 days and (re)examined the patient.  Any updates or changes from those notes are reflected in this note.

## 2024-08-28 ENCOUNTER — ANESTHESIA (OUTPATIENT)
Dept: SURGERY | Facility: CLINIC | Age: 79
End: 2024-08-28
Payer: COMMERCIAL

## 2024-08-28 ENCOUNTER — APPOINTMENT (OUTPATIENT)
Dept: GENERAL RADIOLOGY | Facility: CLINIC | Age: 79
End: 2024-08-28
Attending: ORTHOPAEDIC SURGERY
Payer: COMMERCIAL

## 2024-08-28 ENCOUNTER — HOSPITAL ENCOUNTER (OUTPATIENT)
Facility: CLINIC | Age: 79
Discharge: HOME | End: 2024-08-29
Attending: ORTHOPAEDIC SURGERY | Admitting: ORTHOPAEDIC SURGERY
Payer: COMMERCIAL

## 2024-08-28 ENCOUNTER — ANCILLARY PROCEDURE (OUTPATIENT)
Dept: ULTRASOUND IMAGING | Facility: CLINIC | Age: 79
End: 2024-08-28
Attending: NURSE ANESTHETIST, CERTIFIED REGISTERED
Payer: COMMERCIAL

## 2024-08-28 DIAGNOSIS — Z96.612 STATUS POST REVERSE TOTAL REPLACEMENT OF LEFT SHOULDER: Primary | ICD-10-CM

## 2024-08-28 PROBLEM — M19.012 DJD OF LEFT SHOULDER: Status: ACTIVE | Noted: 2024-08-28

## 2024-08-28 LAB
CREAT SERPL-MCNC: 0.79 MG/DL (ref 0.51–0.95)
EGFRCR SERPLBLD CKD-EPI 2021: 76 ML/MIN/1.73M2
ERYTHROCYTE [DISTWIDTH] IN BLOOD BY AUTOMATED COUNT: 14.3 % (ref 10–15)
GLUCOSE BLDC GLUCOMTR-MCNC: 82 MG/DL (ref 70–99)
HCT VFR BLD AUTO: 36.5 % (ref 35–47)
HGB BLD-MCNC: 11.7 G/DL (ref 11.7–15.7)
MCH RBC QN AUTO: 29.5 PG (ref 26.5–33)
MCHC RBC AUTO-ENTMCNC: 32.1 G/DL (ref 31.5–36.5)
MCV RBC AUTO: 92 FL (ref 78–100)
PLATELET # BLD AUTO: 248 10E3/UL (ref 150–450)
POTASSIUM SERPL-SCNC: 4.3 MMOL/L (ref 3.4–5.3)
RBC # BLD AUTO: 3.97 10E6/UL (ref 3.8–5.2)
WBC # BLD AUTO: 4.6 10E3/UL (ref 4–11)

## 2024-08-28 PROCEDURE — 258N000003 HC RX IP 258 OP 636: Performed by: NURSE ANESTHETIST, CERTIFIED REGISTERED

## 2024-08-28 PROCEDURE — 250N000009 HC RX 250: Performed by: ORTHOPAEDIC SURGERY

## 2024-08-28 PROCEDURE — 84132 ASSAY OF SERUM POTASSIUM: CPT | Performed by: PHYSICIAN ASSISTANT

## 2024-08-28 PROCEDURE — 360N000077 HC SURGERY LEVEL 4, PER MIN: Performed by: ORTHOPAEDIC SURGERY

## 2024-08-28 PROCEDURE — 250N000011 HC RX IP 250 OP 636: Performed by: ORTHOPAEDIC SURGERY

## 2024-08-28 PROCEDURE — 36415 COLL VENOUS BLD VENIPUNCTURE: CPT | Performed by: PHYSICIAN ASSISTANT

## 2024-08-28 PROCEDURE — C1776 JOINT DEVICE (IMPLANTABLE): HCPCS | Performed by: ORTHOPAEDIC SURGERY

## 2024-08-28 PROCEDURE — 272N000001 HC OR GENERAL SUPPLY STERILE: Performed by: ORTHOPAEDIC SURGERY

## 2024-08-28 PROCEDURE — 271N000001 HC OR GENERAL SUPPLY NON-STERILE: Performed by: ORTHOPAEDIC SURGERY

## 2024-08-28 PROCEDURE — 258N000003 HC RX IP 258 OP 636: Performed by: ORTHOPAEDIC SURGERY

## 2024-08-28 PROCEDURE — 82962 GLUCOSE BLOOD TEST: CPT

## 2024-08-28 PROCEDURE — 999N000141 HC STATISTIC PRE-PROCEDURE NURSING ASSESSMENT: Performed by: ORTHOPAEDIC SURGERY

## 2024-08-28 PROCEDURE — 710N000009 HC RECOVERY PHASE 1, LEVEL 1, PER MIN: Performed by: ORTHOPAEDIC SURGERY

## 2024-08-28 PROCEDURE — 82565 ASSAY OF CREATININE: CPT | Performed by: PHYSICIAN ASSISTANT

## 2024-08-28 PROCEDURE — 85027 COMPLETE CBC AUTOMATED: CPT | Performed by: PHYSICIAN ASSISTANT

## 2024-08-28 PROCEDURE — 73030 X-RAY EXAM OF SHOULDER: CPT | Mod: LT

## 2024-08-28 PROCEDURE — 250N000013 HC RX MED GY IP 250 OP 250 PS 637: Performed by: NURSE ANESTHETIST, CERTIFIED REGISTERED

## 2024-08-28 PROCEDURE — 250N000009 HC RX 250: Performed by: NURSE ANESTHETIST, CERTIFIED REGISTERED

## 2024-08-28 PROCEDURE — 250N000011 HC RX IP 250 OP 636: Performed by: PHYSICIAN ASSISTANT

## 2024-08-28 PROCEDURE — 250N000013 HC RX MED GY IP 250 OP 250 PS 637: Performed by: PHYSICIAN ASSISTANT

## 2024-08-28 PROCEDURE — C9290 INJ, BUPIVACAINE LIPOSOME: HCPCS | Performed by: NURSE ANESTHETIST, CERTIFIED REGISTERED

## 2024-08-28 PROCEDURE — 250N000013 HC RX MED GY IP 250 OP 250 PS 637: Performed by: ORTHOPAEDIC SURGERY

## 2024-08-28 PROCEDURE — 370N000017 HC ANESTHESIA TECHNICAL FEE, PER MIN: Performed by: ORTHOPAEDIC SURGERY

## 2024-08-28 PROCEDURE — C1713 ANCHOR/SCREW BN/BN,TIS/BN: HCPCS | Performed by: ORTHOPAEDIC SURGERY

## 2024-08-28 PROCEDURE — 250N000011 HC RX IP 250 OP 636: Performed by: NURSE ANESTHETIST, CERTIFIED REGISTERED

## 2024-08-28 DEVICE — IMP GLENOSPHERE BIOM REV SHLDR 36MM STD 115310: Type: IMPLANTABLE DEVICE | Site: SHOULDER | Status: FUNCTIONAL

## 2024-08-28 DEVICE — IMP SCR LOCKING BIOM REV SHLDR 3.5 HEX 4.75X20MM 180551: Type: IMPLANTABLE DEVICE | Site: SHOULDER | Status: FUNCTIONAL

## 2024-08-28 DEVICE — IMP BEARING HUMERAL ZIM 36MM STD PRLNG 110031418: Type: IMPLANTABLE DEVICE | Site: SHOULDER | Status: FUNCTIONAL

## 2024-08-28 DEVICE — IMP HUMERAL TRAY ZIM RVRS SHLDR STD 110031399: Type: IMPLANTABLE DEVICE | Site: SHOULDER | Status: FUNCTIONAL

## 2024-08-28 DEVICE — IMP BASEPLATE MINI GLENOSPHERE BIOM REV SHLDR 25MM 010000589: Type: IMPLANTABLE DEVICE | Site: SHOULDER | Status: FUNCTIONAL

## 2024-08-28 DEVICE — IMPLANTABLE DEVICE: Type: IMPLANTABLE DEVICE | Site: SHOULDER | Status: FUNCTIONAL

## 2024-08-28 DEVICE — IMP STEM MINI HUMERAL BIOM SHLDR 13MM 113633: Type: IMPLANTABLE DEVICE | Site: SHOULDER | Status: FUNCTIONAL

## 2024-08-28 DEVICE — IMP SCR CENTRAL BIOMET REVERSE SHLDR 6.5X25MM 115395: Type: IMPLANTABLE DEVICE | Site: SHOULDER | Status: FUNCTIONAL

## 2024-08-28 DEVICE — IMP SCR LOCKING BIOM REV SHLDR 3.5 HEX 4.75X15MM 180550: Type: IMPLANTABLE DEVICE | Site: SHOULDER | Status: FUNCTIONAL

## 2024-08-28 RX ORDER — ASPIRIN 325 MG
325 TABLET, DELAYED RELEASE (ENTERIC COATED) ORAL DAILY
Qty: 30 TABLET | Refills: 0 | Status: SHIPPED | OUTPATIENT
Start: 2024-08-28

## 2024-08-28 RX ORDER — OXYCODONE HYDROCHLORIDE 5 MG/1
5-10 TABLET ORAL EVERY 4 HOURS PRN
Qty: 26 TABLET | Refills: 0 | Status: SHIPPED | OUTPATIENT
Start: 2024-08-28

## 2024-08-28 RX ORDER — BUPIVACAINE HYDROCHLORIDE 5 MG/ML
INJECTION, SOLUTION PERINEURAL PRN
Status: DISCONTINUED | OUTPATIENT
Start: 2024-08-28 | End: 2024-08-28

## 2024-08-28 RX ORDER — ONDANSETRON 4 MG/1
4 TABLET, ORALLY DISINTEGRATING ORAL EVERY 30 MIN PRN
Status: DISCONTINUED | OUTPATIENT
Start: 2024-08-28 | End: 2024-08-28

## 2024-08-28 RX ORDER — PROPOFOL 10 MG/ML
INJECTION, EMULSION INTRAVENOUS CONTINUOUS PRN
Status: DISCONTINUED | OUTPATIENT
Start: 2024-08-28 | End: 2024-08-28

## 2024-08-28 RX ORDER — ONDANSETRON 2 MG/ML
INJECTION INTRAMUSCULAR; INTRAVENOUS PRN
Status: DISCONTINUED | OUTPATIENT
Start: 2024-08-28 | End: 2024-08-28

## 2024-08-28 RX ORDER — LIDOCAINE 40 MG/G
CREAM TOPICAL
Status: DISCONTINUED | OUTPATIENT
Start: 2024-08-28 | End: 2024-08-29 | Stop reason: HOSPADM

## 2024-08-28 RX ORDER — ONDANSETRON 2 MG/ML
4 INJECTION INTRAMUSCULAR; INTRAVENOUS EVERY 30 MIN PRN
Status: DISCONTINUED | OUTPATIENT
Start: 2024-08-28 | End: 2024-08-28

## 2024-08-28 RX ORDER — BISACODYL 10 MG
10 SUPPOSITORY, RECTAL RECTAL DAILY PRN
Status: DISCONTINUED | OUTPATIENT
Start: 2024-08-28 | End: 2024-08-29 | Stop reason: HOSPADM

## 2024-08-28 RX ORDER — FAMOTIDINE 20 MG/1
20 TABLET, FILM COATED ORAL 2 TIMES DAILY
Status: DISCONTINUED | OUTPATIENT
Start: 2024-08-28 | End: 2024-08-29 | Stop reason: HOSPADM

## 2024-08-28 RX ORDER — HYDROMORPHONE HCL IN WATER/PF 6 MG/30 ML
0.2 PATIENT CONTROLLED ANALGESIA SYRINGE INTRAVENOUS
Status: DISCONTINUED | OUTPATIENT
Start: 2024-08-28 | End: 2024-08-29 | Stop reason: HOSPADM

## 2024-08-28 RX ORDER — FENTANYL CITRATE 50 UG/ML
25 INJECTION, SOLUTION INTRAMUSCULAR; INTRAVENOUS EVERY 5 MIN PRN
Status: DISCONTINUED | OUTPATIENT
Start: 2024-08-28 | End: 2024-08-28

## 2024-08-28 RX ORDER — OXYCODONE HYDROCHLORIDE 5 MG/1
5 TABLET ORAL EVERY 4 HOURS PRN
Status: DISCONTINUED | OUTPATIENT
Start: 2024-08-28 | End: 2024-08-29 | Stop reason: HOSPADM

## 2024-08-28 RX ORDER — METHOCARBAMOL 500 MG/1
500 TABLET, FILM COATED ORAL 4 TIMES DAILY PRN
Qty: 60 TABLET | Refills: 1 | Status: SHIPPED | OUTPATIENT
Start: 2024-08-28

## 2024-08-28 RX ORDER — SODIUM CHLORIDE, SODIUM LACTATE, POTASSIUM CHLORIDE, CALCIUM CHLORIDE 600; 310; 30; 20 MG/100ML; MG/100ML; MG/100ML; MG/100ML
INJECTION, SOLUTION INTRAVENOUS CONTINUOUS
Status: DISCONTINUED | OUTPATIENT
Start: 2024-08-28 | End: 2024-08-28 | Stop reason: HOSPADM

## 2024-08-28 RX ORDER — ACETAMINOPHEN 325 MG/1
650 TABLET ORAL EVERY 4 HOURS PRN
Status: DISCONTINUED | OUTPATIENT
Start: 2024-08-31 | End: 2024-08-29 | Stop reason: HOSPADM

## 2024-08-28 RX ORDER — NALOXONE HYDROCHLORIDE 0.4 MG/ML
0.1 INJECTION, SOLUTION INTRAMUSCULAR; INTRAVENOUS; SUBCUTANEOUS
Status: DISCONTINUED | OUTPATIENT
Start: 2024-08-28 | End: 2024-08-28

## 2024-08-28 RX ORDER — BUPIVACAINE HYDROCHLORIDE 5 MG/ML
INJECTION, SOLUTION EPIDURAL; INTRACAUDAL
Status: COMPLETED | OUTPATIENT
Start: 2024-08-28 | End: 2024-08-28

## 2024-08-28 RX ORDER — DEXAMETHASONE SODIUM PHOSPHATE 4 MG/ML
INJECTION, SOLUTION INTRA-ARTICULAR; INTRALESIONAL; INTRAMUSCULAR; INTRAVENOUS; SOFT TISSUE PRN
Status: DISCONTINUED | OUTPATIENT
Start: 2024-08-28 | End: 2024-08-28

## 2024-08-28 RX ORDER — CEFAZOLIN SODIUM/WATER 2 G/20 ML
2 SYRINGE (ML) INTRAVENOUS
Status: DISCONTINUED | OUTPATIENT
Start: 2024-08-28 | End: 2024-08-28 | Stop reason: HOSPADM

## 2024-08-28 RX ORDER — OXYCODONE HYDROCHLORIDE 5 MG/1
10 TABLET ORAL EVERY 4 HOURS PRN
Status: DISCONTINUED | OUTPATIENT
Start: 2024-08-28 | End: 2024-08-29 | Stop reason: HOSPADM

## 2024-08-28 RX ORDER — CEFAZOLIN SODIUM/WATER 2 G/20 ML
2 SYRINGE (ML) INTRAVENOUS SEE ADMIN INSTRUCTIONS
Status: DISCONTINUED | OUTPATIENT
Start: 2024-08-28 | End: 2024-08-28 | Stop reason: HOSPADM

## 2024-08-28 RX ORDER — LIDOCAINE 40 MG/G
CREAM TOPICAL
Status: DISCONTINUED | OUTPATIENT
Start: 2024-08-28 | End: 2024-08-28 | Stop reason: HOSPADM

## 2024-08-28 RX ORDER — ACETAMINOPHEN 325 MG/1
975 TABLET ORAL ONCE
Status: DISCONTINUED | OUTPATIENT
Start: 2024-08-28 | End: 2024-08-28

## 2024-08-28 RX ORDER — ACETAMINOPHEN 325 MG/1
975 TABLET ORAL ONCE
Status: COMPLETED | OUTPATIENT
Start: 2024-08-28 | End: 2024-08-28

## 2024-08-28 RX ORDER — DIPHENHYDRAMINE HCL 12.5 MG/5ML
12.5 SOLUTION ORAL EVERY 6 HOURS PRN
Status: DISCONTINUED | OUTPATIENT
Start: 2024-08-28 | End: 2024-08-29 | Stop reason: HOSPADM

## 2024-08-28 RX ORDER — ASPIRIN 325 MG
325 TABLET, DELAYED RELEASE (ENTERIC COATED) ORAL DAILY
Status: DISCONTINUED | OUTPATIENT
Start: 2024-08-29 | End: 2024-08-29 | Stop reason: HOSPADM

## 2024-08-28 RX ORDER — ONDANSETRON 2 MG/ML
4 INJECTION INTRAMUSCULAR; INTRAVENOUS EVERY 6 HOURS PRN
Status: DISCONTINUED | OUTPATIENT
Start: 2024-08-28 | End: 2024-08-29 | Stop reason: HOSPADM

## 2024-08-28 RX ORDER — FENTANYL CITRATE-0.9 % NACL/PF 10 MCG/ML
PLASTIC BAG, INJECTION (ML) INTRAVENOUS CONTINUOUS PRN
Status: DISCONTINUED | OUTPATIENT
Start: 2024-08-28 | End: 2024-08-28

## 2024-08-28 RX ORDER — PROPOFOL 10 MG/ML
INJECTION, EMULSION INTRAVENOUS PRN
Status: DISCONTINUED | OUTPATIENT
Start: 2024-08-28 | End: 2024-08-28

## 2024-08-28 RX ORDER — LIDOCAINE HYDROCHLORIDE 20 MG/ML
INJECTION, SOLUTION INFILTRATION; PERINEURAL PRN
Status: DISCONTINUED | OUTPATIENT
Start: 2024-08-28 | End: 2024-08-28

## 2024-08-28 RX ORDER — PROCHLORPERAZINE MALEATE 5 MG
5 TABLET ORAL EVERY 6 HOURS PRN
Status: DISCONTINUED | OUTPATIENT
Start: 2024-08-28 | End: 2024-08-29 | Stop reason: HOSPADM

## 2024-08-28 RX ORDER — ACETAMINOPHEN 325 MG/1
650 TABLET ORAL EVERY 4 HOURS PRN
Status: SHIPPED
Start: 2024-08-28

## 2024-08-28 RX ORDER — ACETAMINOPHEN 325 MG/1
975 TABLET ORAL EVERY 8 HOURS
Status: DISCONTINUED | OUTPATIENT
Start: 2024-08-28 | End: 2024-08-29 | Stop reason: HOSPADM

## 2024-08-28 RX ORDER — DEXAMETHASONE SODIUM PHOSPHATE 4 MG/ML
4 INJECTION, SOLUTION INTRA-ARTICULAR; INTRALESIONAL; INTRAMUSCULAR; INTRAVENOUS; SOFT TISSUE
Status: DISCONTINUED | OUTPATIENT
Start: 2024-08-28 | End: 2024-08-28

## 2024-08-28 RX ORDER — TRANEXAMIC ACID 650 MG/1
1950 TABLET ORAL ONCE
Status: COMPLETED | OUTPATIENT
Start: 2024-08-28 | End: 2024-08-28

## 2024-08-28 RX ORDER — METHOCARBAMOL 500 MG/1
500 TABLET, FILM COATED ORAL EVERY 6 HOURS PRN
Status: DISCONTINUED | OUTPATIENT
Start: 2024-08-28 | End: 2024-08-29 | Stop reason: HOSPADM

## 2024-08-28 RX ORDER — FENTANYL CITRATE 50 UG/ML
INJECTION, SOLUTION INTRAMUSCULAR; INTRAVENOUS PRN
Status: DISCONTINUED | OUTPATIENT
Start: 2024-08-28 | End: 2024-08-28

## 2024-08-28 RX ORDER — PHENTERMINE HYDROCHLORIDE 30 MG/1
1 CAPSULE ORAL DAILY
COMMUNITY
Start: 2024-07-29

## 2024-08-28 RX ORDER — SODIUM CHLORIDE, SODIUM LACTATE, POTASSIUM CHLORIDE, CALCIUM CHLORIDE 600; 310; 30; 20 MG/100ML; MG/100ML; MG/100ML; MG/100ML
INJECTION, SOLUTION INTRAVENOUS CONTINUOUS
Status: DISCONTINUED | OUTPATIENT
Start: 2024-08-28 | End: 2024-08-28

## 2024-08-28 RX ORDER — POLYETHYLENE GLYCOL 3350 17 G/17G
17 POWDER, FOR SOLUTION ORAL DAILY
Status: DISCONTINUED | OUTPATIENT
Start: 2024-08-29 | End: 2024-08-29 | Stop reason: HOSPADM

## 2024-08-28 RX ORDER — ONDANSETRON 4 MG/1
4 TABLET, ORALLY DISINTEGRATING ORAL EVERY 6 HOURS PRN
Status: DISCONTINUED | OUTPATIENT
Start: 2024-08-28 | End: 2024-08-29 | Stop reason: HOSPADM

## 2024-08-28 RX ORDER — HYDROMORPHONE HCL IN WATER/PF 6 MG/30 ML
0.4 PATIENT CONTROLLED ANALGESIA SYRINGE INTRAVENOUS
Status: DISCONTINUED | OUTPATIENT
Start: 2024-08-28 | End: 2024-08-29 | Stop reason: HOSPADM

## 2024-08-28 RX ORDER — CEFAZOLIN 2 G/1
2 INJECTION, POWDER, FOR SOLUTION INTRAVENOUS EVERY 8 HOURS
Status: COMPLETED | OUTPATIENT
Start: 2024-08-28 | End: 2024-08-29

## 2024-08-28 RX ORDER — AMOXICILLIN 500 MG/1
TABLET, FILM COATED ORAL
COMMUNITY
Start: 2024-06-21

## 2024-08-28 RX ORDER — SODIUM CHLORIDE, SODIUM LACTATE, POTASSIUM CHLORIDE, CALCIUM CHLORIDE 600; 310; 30; 20 MG/100ML; MG/100ML; MG/100ML; MG/100ML
INJECTION, SOLUTION INTRAVENOUS CONTINUOUS
Status: DISCONTINUED | OUTPATIENT
Start: 2024-08-28 | End: 2024-08-29 | Stop reason: HOSPADM

## 2024-08-28 RX ORDER — FENTANYL CITRATE 50 UG/ML
50 INJECTION, SOLUTION INTRAMUSCULAR; INTRAVENOUS EVERY 5 MIN PRN
Status: DISCONTINUED | OUTPATIENT
Start: 2024-08-28 | End: 2024-08-28

## 2024-08-28 RX ORDER — AMOXICILLIN 250 MG
1 CAPSULE ORAL 2 TIMES DAILY
Status: DISCONTINUED | OUTPATIENT
Start: 2024-08-28 | End: 2024-08-29 | Stop reason: HOSPADM

## 2024-08-28 RX ORDER — LOSARTAN POTASSIUM 100 MG/1
1 TABLET ORAL DAILY
COMMUNITY
Start: 2024-05-01

## 2024-08-28 RX ADMIN — PHENYLEPHRINE HYDROCHLORIDE 100 MCG: 10 INJECTION INTRAVENOUS at 16:41

## 2024-08-28 RX ADMIN — MIDAZOLAM 2 MG: 1 INJECTION INTRAMUSCULAR; INTRAVENOUS at 15:53

## 2024-08-28 RX ADMIN — PHENYLEPHRINE HYDROCHLORIDE 100 MCG: 10 INJECTION INTRAVENOUS at 17:03

## 2024-08-28 RX ADMIN — ROCURONIUM BROMIDE 50 MG: 50 INJECTION, SOLUTION INTRAVENOUS at 16:27

## 2024-08-28 RX ADMIN — ACETAMINOPHEN 975 MG: 325 TABLET ORAL at 15:01

## 2024-08-28 RX ADMIN — BUPIVACAINE 10 ML: 13.3 INJECTION, SUSPENSION, LIPOSOMAL INFILTRATION at 16:08

## 2024-08-28 RX ADMIN — FENTANYL CITRATE 50 MCG: 50 INJECTION INTRAMUSCULAR; INTRAVENOUS at 16:25

## 2024-08-28 RX ADMIN — SODIUM CHLORIDE, POTASSIUM CHLORIDE, SODIUM LACTATE AND CALCIUM CHLORIDE: 600; 310; 30; 20 INJECTION, SOLUTION INTRAVENOUS at 17:12

## 2024-08-28 RX ADMIN — ONDANSETRON 4 MG: 2 INJECTION INTRAMUSCULAR; INTRAVENOUS at 17:48

## 2024-08-28 RX ADMIN — PHENYLEPHRINE HYDROCHLORIDE 100 MCG: 10 INJECTION INTRAVENOUS at 16:39

## 2024-08-28 RX ADMIN — FENTANYL CITRATE 50 MCG: 50 INJECTION INTRAMUSCULAR; INTRAVENOUS at 18:21

## 2024-08-28 RX ADMIN — FAMOTIDINE 20 MG: 20 TABLET, FILM COATED ORAL at 20:12

## 2024-08-28 RX ADMIN — LIDOCAINE HYDROCHLORIDE 60 MG: 20 INJECTION, SOLUTION INFILTRATION; PERINEURAL at 16:26

## 2024-08-28 RX ADMIN — FENTANYL CITRATE 50 MCG: 50 INJECTION INTRAMUSCULAR; INTRAVENOUS at 18:25

## 2024-08-28 RX ADMIN — PROPOFOL 150 MG: 10 INJECTION, EMULSION INTRAVENOUS at 16:26

## 2024-08-28 RX ADMIN — SENNOSIDES AND DOCUSATE SODIUM 1 TABLET: 50; 8.6 TABLET ORAL at 20:12

## 2024-08-28 RX ADMIN — FENTANYL CITRATE 25 MCG: 50 INJECTION INTRAMUSCULAR; INTRAVENOUS at 16:51

## 2024-08-28 RX ADMIN — PROPOFOL 200 MCG/KG/MIN: 10 INJECTION, EMULSION INTRAVENOUS at 16:28

## 2024-08-28 RX ADMIN — CEFAZOLIN 2 G: 2 INJECTION, POWDER, LYOPHILIZED, FOR SOLUTION INTRAVENOUS at 22:16

## 2024-08-28 RX ADMIN — SODIUM CHLORIDE, POTASSIUM CHLORIDE, SODIUM LACTATE AND CALCIUM CHLORIDE: 600; 310; 30; 20 INJECTION, SOLUTION INTRAVENOUS at 19:49

## 2024-08-28 RX ADMIN — DEXAMETHASONE SODIUM PHOSPHATE 8 MG: 4 INJECTION, SOLUTION INTRA-ARTICULAR; INTRALESIONAL; INTRAMUSCULAR; INTRAVENOUS; SOFT TISSUE at 16:37

## 2024-08-28 RX ADMIN — SUGAMMADEX 200 MG: 100 INJECTION, SOLUTION INTRAVENOUS at 17:54

## 2024-08-28 RX ADMIN — ACETAMINOPHEN 975 MG: 325 TABLET ORAL at 22:16

## 2024-08-28 RX ADMIN — TRANEXAMIC ACID 1950 MG: 650 TABLET ORAL at 15:01

## 2024-08-28 RX ADMIN — BUPIVACAINE HYDROCHLORIDE 10 ML: 5 INJECTION, SOLUTION EPIDURAL; INTRACAUDAL; PERINEURAL at 16:08

## 2024-08-28 RX ADMIN — SODIUM CHLORIDE, POTASSIUM CHLORIDE, SODIUM LACTATE AND CALCIUM CHLORIDE: 600; 310; 30; 20 INJECTION, SOLUTION INTRAVENOUS at 15:11

## 2024-08-28 RX ADMIN — FENTANYL CITRATE 25 MCG: 50 INJECTION INTRAMUSCULAR; INTRAVENOUS at 16:54

## 2024-08-28 RX ADMIN — Medication 2 G: at 16:28

## 2024-08-28 RX ADMIN — Medication 60 MCG/MIN: at 16:41

## 2024-08-28 ASSESSMENT — ACTIVITIES OF DAILY LIVING (ADL)
ADLS_ACUITY_SCORE: 24
ADLS_ACUITY_SCORE: 24
ADLS_ACUITY_SCORE: 25
ADLS_ACUITY_SCORE: 24
ADLS_ACUITY_SCORE: 25
ADLS_ACUITY_SCORE: 23
ADLS_ACUITY_SCORE: 24

## 2024-08-28 NOTE — ANESTHESIA CARE TRANSFER NOTE
Patient: Marilee Marks    Procedure: Procedure(s):  Reverse Total Shoulder Arthroplasty left       Diagnosis: Osteoarthritis of left shoulder, unspecified osteoarthritis type [M19.012]  Diagnosis Additional Information: No value filed.    Anesthesia Type:   General     Note:    Oropharynx: oropharynx clear of all foreign objects  Level of Consciousness: awake and drowsy  Oxygen Supplementation: face mask  Level of Supplemental Oxygen (L/min / FiO2): 6  Independent Airway: airway patency satisfactory and stable  Dentition: dentition unchanged  Vital Signs Stable: post-procedure vital signs reviewed and stable  Report to RN Given: handoff report given  Patient transferred to: PACU    Handoff Report: Identifed the Patient, Identified the Reponsible Provider, Reviewed the pertinent medical history, Discussed the surgical course, Reviewed Intra-OP anesthesia mangement and issues during anesthesia, Set expectations for post-procedure period and Allowed opportunity for questions and acknowledgement of understanding    Vitals:  Vitals Value Taken Time   /69 08/28/24 1807   Temp     Pulse 81 08/28/24 1809   Resp 13 08/28/24 1809   SpO2 95 % 08/28/24 1809   Vitals shown include unfiled device data.    Electronically Signed By: ALANIS Meyer CRNA  August 28, 2024  6:10 PM

## 2024-08-28 NOTE — ANESTHESIA PROCEDURE NOTES
Brachial plexus Procedure Note    Pre-Procedure   Staff -        CRNA: Betsy Xiong APRN CRNA       Performed By: CRNA       Location: pre-op       Procedure Start/Stop Times: 8/28/2024 3:51 PM and 8/28/2024 4:11 PM       Pre-Anesthestic Checklist: patient identified, IV checked, site marked, risks and benefits discussed, informed consent, monitors and equipment checked, pre-op evaluation, at physician/surgeon's request and post-op pain management  Timeout:       Correct Patient: Yes        Correct Procedure: Yes        Correct Site: Yes        Correct Position: Yes        Correct Laterality: Yes        Site Marked: Yes  Procedure Documentation  Procedure: Brachial plexus       Diagnosis: RIGHT SHOULDER ROTATOR CUFF TEAR       Laterality: left       Patient Position: supine       Skin prep: Chloraprep       Local skin infiltrated with 1 mL of 1% lidocaine.  (interscalene approach).       Needle Type: insulated       Needle Gauge: 21.        Needle Length (Inches): 4        Ultrasound guided       1. Ultrasound was used to identify targeted nerve, plexus, vascular marker, or fascial plane and place a needle adjacent to it in real-time.       2. Ultrasound was used to visualize the spread of anesthetic in close proximity to the above referenced structure.       3. A permanent image is entered into the patient's record.       4. The visualized anatomic structures appeared normal.       5. There were no apparent abnormal pathologic findings.    Assessment/Narrative         The placement was negative for: blood aspirated, painful injection and site bleeding       Paresthesias: No.       Bolus given via needle. no blood aspirated via catheter.        Secured via.        Insertion/Infusion Method: Single Shot       Complications: none       Injection made incrementally with aspirations every 5 mL.    Medication(s) Administered   Bupivacaine 0.5% PF (Infiltration) - Infiltration   10 mL - 8/28/2024 4:08:00  "PM  Bupivacaine liposome (Exparel) 1.3% LA inj susp (Infiltration) - Infiltration   10 mL - 8/28/2024 4:08:00 PM  Medication Administration Time: 8/28/2024 3:51 PM      FOR [facility] ([facility]) ONLY:   Pain Team Contact information: please page the Pain Team Via [email or web address]. Search \"Pain\". During daytime hours, please page the attending first. At night please page the resident first.      "

## 2024-08-28 NOTE — OP NOTE
Procedure Date: 08/28/24     PREOPERATIVE DIAGNOSIS:  Left shoulder rotator cuff tear with shoulder degenerative arthritis.     POSTOPERATIVE DIAGNOSIS:  same     PROCEDURE PERFORMED:  Left Reverse  total shoulder arthroplasty utilizing a Biomet size 13 mini humeral stem, standard glenoid baseplate with central fixation screww and 4 locking screws, Standard 36mm glenosphere in offset postion C, 36mm  Standard glenoid bearing surface with standardHumeral tray.     SURGEON:  Fabian Deras MD     ASSISTANT:  Manpreet Horton PA-C.  Manpreet Horton PA-C, was present throughout the procedure, critical for patient positioning, prepping, draping, safe progression of procedure, wound closure, and sling placement.     DESCRIPTION OF PROCEDURE:  After informed risks, benefits, alternatives, and expected outcomes of the procedure, the patient desired to proceed.  She was brought to the operating suite where he was placed under general anesthesia, received 2 grams of Ancef, 1 gram of tranexamic acid.  A timeout verification step was completed.  He was positioned in low beach chair position.  Her right upper extremity was prepped and draped in a manner appropriate for procedure.     A deltopectoral approach was taken to the shoulder.  The cephalic vein was protected and retracted laterally.  Cobell retractor was placed.  Conjoined tendon was identified.  Clavipectoral fascia was divided, and the biceps tendon was not  identified within the groove.   Attention was then directed towards the subscapularis tendon, it was elevated off the lesser tuberosity, tagged with a #1 Ethibond stitches and retracted.  It was then divided inferiorly.  Leash of vessels was ligated.  Retractor was placed between the subscap and the joint capsule to protect the axillary nerve throughout these procedures.  External rotation was maintained to protect the axillary nerve.  The inferior capsule was then sharply divided with Metzenbaum  scissors.  The subscap was retracted, and attention was directed towards completing a proximal humeral resection.  A 30-degree retroverted x 45-degree proximal resection was completed using the Biomet guide.  This was followed by placement of Fukuda retractor, circumferential labral excision.      The anterior labrum was cleared of soft tissue, and a Bankart retractor was placed.  The modified rabbit ear retractor was placed posteriorly and excellent exposure of the glenoid was achieved.  The central pin was placed utilizing a  glenoid guide.  This was reamed to correct slight retroversion.  A standard baseplate was then positioned over the guidepin all seating was demonstrated in the central lobe it was utilized 25 mm central fixation screw was then secured there was taken to him and straight complete seating of the central screw.  Peripheral locking screws were then placed 20 mm inferior superior and 15 mm locking screws were placed utilizing the fixed we will guide for each.  Table fixation was felt to been achieved.  A standard 36 mm glenosphere in the C offset position was then assembled on the back table and secured to the glenoid baseplate and checked for stability.     The humeral head could then be fully exposed using a brown retractor and Rojas retractors, canal finder, sequential reaming up to 12 mm, followed by broaching up to 13 mm was completed.  A trial standard humeral tray and 36 mm standard bearing surface were positioned excellent range of motion stability and tensioning of the conjoined tendon with 90 degrees of internal rotation and 90 degrees of abduction demonstrated.  Final 13 mm mini humeral stem was secured the standard humeral tray was standard 36mm bearing surface was assembled secured to the humeral stem.      Wounds were copiously irrigated and subcutaneous was closed with 2-0 Vicryl.  Skin was closed with a 3-0 Stratafix, Steri-Strips, and Aquacel dressing.  The patient tolerated  the procedure well.  Returned to recovery in stable condition.     ESTIMATED BLOOD LOSS:  100 mL.     Fabian Deras MD

## 2024-08-28 NOTE — PROVIDER NOTIFICATION
08/28/24 1509   Discharge Planning   Patient/Family Anticipates Transition to home   Concerns to be Addressed all concerns addressed in this encounter   Living Arrangements   People in Home spouse   Type of Residence Private Residence   Is your private residence a single family home or apartment? Single family home   Number of Stairs, Within Home, Primary one   Stair Railings, Within Home, Primary railings safe and in good condition   Once home, are you able to live on one level? Yes   Which level? Main Level   Which rooms are not on the main level? Bedroom;Bathroom;Living Room;Kitchen   Bathroom Shower/Tub Walk-in shower   Equipment Currently Used at Home none   Support System   Support Systems Spouse/Significant Other   Do you have someone available to stay with you one or two nights once you are home? Yes   Medical Clearance   Date of Physical 08/12/24   Clinic Name [facility]   It is recommended that you call and check with any specialty providers before surgery to see if you need surgical clearance.  Do you see any specialty providers outside of your primary care provider? No   Blood   Known Bleeding Disorder or Coagulopathy No   Does the patient have any Sikh/cultural preferences related to blood products? No   Education   Has the patient scheduled or completed pre-op total joint education, either in class or online, in the last 12 months? Yes   Patient attended total joint pre-op class/received pre-op teaching  online   Relationship/Living Environment   Name(s) of People in Home Raymond

## 2024-08-28 NOTE — ANESTHESIA PROCEDURE NOTES
Airway       Patient location during procedure: OR       Procedure Start/Stop Times: 8/28/2024 4:28 PM  Staff -        CRNA: Betsy Xiong APRN CRNA       Performed By: CRNA  Consent for Airway        Urgency: elective  Indications and Patient Condition       Indications for airway management: obi-procedural       Induction type:intravenous       Mask difficulty assessment: 1 - vent by mask    Final Airway Details       Final airway type: endotracheal airway       Successful airway: ETT - single  Endotracheal Airway Details        ETT size (mm): 7.0       Successful intubation technique: video laryngoscopy       VL Blade Size: Wills 3       Grade View of Cords: 1       Adjucts: stylet       Position: Right       Measured from: gums/teeth       Secured at (cm): 20       Bite block used: None    Post intubation assessment        Placement verified by: capnometry, equal breath sounds and chest rise        Number of attempts at approach: 1       Secured with: tape       Ease of procedure: easy       Dentition: Intact    Medication(s) Administered   Medication Administration Time: 8/28/2024 4:28 PM

## 2024-08-28 NOTE — MEDICATION SCRIBE - ADMISSION MEDICATION HISTORY
Medication Scribe Admission Medication History    Admission medication history is complete. The information provided in this note is only as accurate as the sources available at the time of the update.    Information Source(s): Patient via in-person    Pertinent Information:     Changes made to PTA medication list:  Added: losartan, amoxicillin  Deleted: probiotic, methocarbamol, senokot  Changed: phentermine 15 mg every day to 30 mg every day     Allergies reviewed with patient and updates made in EHR: yes    Medication History Completed By: Mary Sanchez 8/28/2024 3:09 PM    PTA Med List   Medication Sig Note Last Dose    acetaminophen (TYLENOL) 325 MG tablet Take 2 tablets (650 mg) by mouth every 4 hours as needed for other (mild pain)  8/28/2024 at 0600    alendronate (FOSAMAX) 70 MG tablet Take 70 mg by mouth every 7 days 8/28/2024: Takes every Tuesday 8/27/2024 at am    amLODIPine (NORVASC) 10 MG tablet Take 1 tablet by mouth daily.  8/28/2024 at am    amoxicillin (AMOXIL) 500 MG tablet TAKE 4 TABLETS 1 HOUR BEFORE DENTAL APPOINTMENT.      aspirin (ASA) 325 MG EC tablet Take 1 tablet (325 mg) by mouth daily  8/21/2024 at am    buPROPion (WELLBUTRIN XL) 150 MG 24 hr tablet TAKE 1 TABLET EVERY MORNING (Patient taking differently: Take 150 mg by mouth every morning.)  8/28/2024 at am    celecoxib (CELEBREX) 200 MG capsule Take 1 capsule (200 mg) by mouth daily  8/25/2024 at am    CENTRUM SILVER PO TABS Take one tablet by mouth every day  8/21/2024 at am    levothyroxine (SYNTHROID/LEVOTHROID) 112 MCG tablet Take 1 tablet (112 mcg) by mouth daily  8/28/2024 at am    losartan (COZAAR) 100 MG tablet Take 1 tablet by mouth daily.  8/28/2024 at am    magnesium 250 MG tablet Take 1 tablet by mouth daily  8/21/2024 at am    oxyCODONE (ROXICODONE) 5 MG tablet Take 1-2 tablets (5-10 mg) by mouth every 4 hours as needed for moderate to severe pain  More than a month at unknown    phentermine 30 MG capsule Take 1  capsule by mouth daily. Before a meal  8/21/2024 at am    rosuvastatin (CRESTOR) 20 MG tablet Take 1 tablet by mouth at bedtime  8/28/2024 at am    topiramate (TOPAMAX) 50 MG tablet Take 1 tablet (50 mg) by mouth 2 times daily  8/21/2024 at pm    VITAMIN D3 1000 UNIT PO CAPS Take 1 capsule by mouth daily  8/21/2024 at am

## 2024-08-28 NOTE — BRIEF OP NOTE
"[facility]    Brief Operative Note    Pre-operative diagnosis: Osteoarthritis of left shoulder, unspecified osteoarthritis type [M19.012]  Post-operative diagnosis Same as pre-operative diagnosis    Procedure: Reverse Total Shoulder Arthroplasty left, Left - Shoulder    Surgeon: Surgeons and Role:     * Fabian Deras MD - Primary     * Manpreet Horton PA-C - Assisting  Anesthesia: General with Block   Estimated Blood Loss: Less than 100 ml    Drains: None  Specimens: * No specimens in log *  Findings:   None.  Complications: None.  Implants:   Implant Name Type Inv. Item Serial No.  Lot No. LRB No. Used Action   PIN STEINMANN BIOMET REV SHLDER 3.2MM 9\" THRD SS [id number] - [id number] Wire PIN STEINMANN BIOMET REV SHLDER 3.2MM 9\" THRD SS [id number]  [employer]  Left 1 Used as a Supply   IMP BASEPLATE MINI GLENOSPHERE BIOM REV SHLDR 25MM [id number] - [id number] Total Joint Component/Insert IMP BASEPLATE MINI GLENOSPHERE BIOM REV SHLDR 25MM [id number]  [employer] [id number] Left 1 Implanted   IMP SCR CENTRAL BIOMET REVERSE SHLDR 6.5X25MM [id number] - [id number] Metallic Hardware/Sula IMP SCR CENTRAL BIOMET REVERSE SHLDR 6.5X25MM 224749  [employer] [id number] Left 1 Implanted   IMP SCR LOCKING BIOM REV SHLDR 3.5 HEX 4.69S41RM 197177 - [id number] Total Joint Component/Insert IMP SCR LOCKING BIOM REV SHLDR 3.5 HEX [id number] 335941  [employer] [id number] Left 1 Implanted   IMP SCR LOCKING BIOM REV SHLDR 3.5 HEX 4.18L47PX 669966 - [id number] Metallic Hardware/Sula IMP SCR LOCKING BIOM REV SHLDR 3.5 HEX 4.25U02TE 765294  [employer] [id number] Left 1 Implanted   IMP SCR LOCKING BIOM REV SHLDR 3.5 HEX 4.44Q63GG 142769 - [id number] Metallic Hardware/Sula IMP SCR LOCKING BIOM REV SHLDR 3.5 HEX 4.57U18MA 285206  [employer] [id number] Left 1 Implanted   IMP SCR LOCKING BIOM REV SHLDR 3.5 HEX 4.97D39DP 892051 - [id number] Total Joint Component/Insert IMP SCR LOCKING " BIOM REV SHLDR 3.5 HEX 4.14S80HU 789729  [employer] [id number] Left 1 Implanted   IMP GLENOSPHERE BIOM REV SHLDR 36MM STD 395664 - [id number] Total Joint Component/Insert IMP GLENOSPHERE BIOM REV SHLDR 36MM STD 820063  [employer] [id number] Left 1 Implanted   IMP STEM MINI HUMERAL BIOM SHLDR 13MM 903658 - [id number] Total Joint Component/Insert IMP STEM MINI HUMERAL BIOM SHLDR 13MM 673213  [employer] [id number] Left 1 Implanted   IMP BEARING HUMERAL ZIM 36MM STD PRLNG [id number] - [id number] Total Joint Component/Insert IMP BEARING HUMERAL ZIM 36MM STD PRLNG [id number]  [employer] [id number] Left 1 Implanted   IMP HUMERAL TRAY ZIM RVRS SHLDR STD [id number] - [id number] Total Joint Component/Insert IMP HUMERAL TRAY ZIM RVRS SHLDR STD [id number]  [employer] [id number] Left 1 Implanted

## 2024-08-29 ENCOUNTER — APPOINTMENT (OUTPATIENT)
Dept: OCCUPATIONAL THERAPY | Facility: CLINIC | Age: 79
End: 2024-08-29
Attending: ORTHOPAEDIC SURGERY
Payer: COMMERCIAL

## 2024-08-29 VITALS
DIASTOLIC BLOOD PRESSURE: 48 MMHG | SYSTOLIC BLOOD PRESSURE: 121 MMHG | OXYGEN SATURATION: 97 % | HEART RATE: 85 BPM | BODY MASS INDEX: 31.41 KG/M2 | RESPIRATION RATE: 18 BRPM | TEMPERATURE: 97.8 F | HEIGHT: 64 IN | WEIGHT: 184 LBS

## 2024-08-29 PROBLEM — E21.3 HYPERPARATHYROIDISM (H): Status: ACTIVE | Noted: 2024-08-29

## 2024-08-29 PROBLEM — M19.90 OSTEOARTHRITIS: Status: ACTIVE | Noted: 2024-08-29

## 2024-08-29 PROBLEM — I25.10 NONOBSTRUCTIVE ATHEROSCLEROSIS OF CORONARY ARTERY: Status: ACTIVE | Noted: 2024-08-29

## 2024-08-29 PROBLEM — E78.5 DYSLIPIDEMIA: Status: ACTIVE | Noted: 2024-08-29

## 2024-08-29 LAB
GLUCOSE BLDC GLUCOMTR-MCNC: 114 MG/DL (ref 70–99)
HGB BLD-MCNC: 10.7 G/DL (ref 11.7–15.7)
HOLD SPECIMEN: NORMAL

## 2024-08-29 PROCEDURE — 97165 OT EVAL LOW COMPLEX 30 MIN: CPT | Mod: GO

## 2024-08-29 PROCEDURE — 250N000011 HC RX IP 250 OP 636: Performed by: ORTHOPAEDIC SURGERY

## 2024-08-29 PROCEDURE — 97110 THERAPEUTIC EXERCISES: CPT | Mod: GO

## 2024-08-29 PROCEDURE — 258N000003 HC RX IP 258 OP 636: Performed by: ORTHOPAEDIC SURGERY

## 2024-08-29 PROCEDURE — 82962 GLUCOSE BLOOD TEST: CPT

## 2024-08-29 PROCEDURE — 99232 SBSQ HOSP IP/OBS MODERATE 35: CPT | Performed by: PHYSICIAN ASSISTANT

## 2024-08-29 PROCEDURE — 85018 HEMOGLOBIN: CPT | Performed by: ORTHOPAEDIC SURGERY

## 2024-08-29 PROCEDURE — 36415 COLL VENOUS BLD VENIPUNCTURE: CPT | Performed by: ORTHOPAEDIC SURGERY

## 2024-08-29 PROCEDURE — 97535 SELF CARE MNGMENT TRAINING: CPT | Mod: GO

## 2024-08-29 PROCEDURE — 250N000013 HC RX MED GY IP 250 OP 250 PS 637: Performed by: ORTHOPAEDIC SURGERY

## 2024-08-29 PROCEDURE — 250N000013 HC RX MED GY IP 250 OP 250 PS 637: Performed by: PHYSICIAN ASSISTANT

## 2024-08-29 PROCEDURE — 258N000003 HC RX IP 258 OP 636: Performed by: PHYSICIAN ASSISTANT

## 2024-08-29 RX ORDER — MULTIVITAMIN WITH IRON
250 TABLET ORAL DAILY
Status: DISCONTINUED | OUTPATIENT
Start: 2024-08-29 | End: 2024-08-29

## 2024-08-29 RX ORDER — BUPROPION HYDROCHLORIDE 150 MG/1
150 TABLET ORAL EVERY MORNING
Status: DISCONTINUED | OUTPATIENT
Start: 2024-08-29 | End: 2024-08-29 | Stop reason: HOSPADM

## 2024-08-29 RX ORDER — ROSUVASTATIN CALCIUM 20 MG/1
20 TABLET, COATED ORAL AT BEDTIME
Status: DISCONTINUED | OUTPATIENT
Start: 2024-08-29 | End: 2024-08-29 | Stop reason: HOSPADM

## 2024-08-29 RX ORDER — TOPIRAMATE 25 MG/1
50 TABLET, FILM COATED ORAL 2 TIMES DAILY
Status: DISCONTINUED | OUTPATIENT
Start: 2024-08-29 | End: 2024-08-29 | Stop reason: HOSPADM

## 2024-08-29 RX ORDER — LEVOTHYROXINE SODIUM 112 UG/1
112 TABLET ORAL DAILY
Status: DISCONTINUED | OUTPATIENT
Start: 2024-08-29 | End: 2024-08-29 | Stop reason: HOSPADM

## 2024-08-29 RX ORDER — AMLODIPINE BESYLATE 10 MG/1
10 TABLET ORAL DAILY
Status: DISCONTINUED | OUTPATIENT
Start: 2024-08-29 | End: 2024-08-29 | Stop reason: HOSPADM

## 2024-08-29 RX ORDER — LOSARTAN POTASSIUM 50 MG/1
100 TABLET ORAL DAILY
Status: DISCONTINUED | OUTPATIENT
Start: 2024-08-29 | End: 2024-08-29 | Stop reason: HOSPADM

## 2024-08-29 RX ADMIN — BUPROPION HYDROCHLORIDE 150 MG: 150 TABLET, FILM COATED, EXTENDED RELEASE ORAL at 08:34

## 2024-08-29 RX ADMIN — SODIUM CHLORIDE, POTASSIUM CHLORIDE, SODIUM LACTATE AND CALCIUM CHLORIDE: 600; 310; 30; 20 INJECTION, SOLUTION INTRAVENOUS at 06:26

## 2024-08-29 RX ADMIN — CEFAZOLIN 2 G: 2 INJECTION, POWDER, LYOPHILIZED, FOR SOLUTION INTRAVENOUS at 06:26

## 2024-08-29 RX ADMIN — ASPIRIN 325 MG: 325 TABLET ORAL at 08:25

## 2024-08-29 RX ADMIN — FAMOTIDINE 20 MG: 20 TABLET, FILM COATED ORAL at 08:25

## 2024-08-29 RX ADMIN — POLYETHYLENE GLYCOL 3350 17 G: 17 POWDER, FOR SOLUTION ORAL at 08:24

## 2024-08-29 RX ADMIN — ACETAMINOPHEN 975 MG: 325 TABLET ORAL at 08:25

## 2024-08-29 RX ADMIN — LOSARTAN POTASSIUM 100 MG: 50 TABLET, FILM COATED ORAL at 08:25

## 2024-08-29 RX ADMIN — AMLODIPINE BESYLATE 10 MG: 10 TABLET ORAL at 08:25

## 2024-08-29 RX ADMIN — Medication 200 MG: at 09:21

## 2024-08-29 RX ADMIN — SODIUM CHLORIDE, POTASSIUM CHLORIDE, SODIUM LACTATE AND CALCIUM CHLORIDE 500 ML: 600; 310; 30; 20 INJECTION, SOLUTION INTRAVENOUS at 11:49

## 2024-08-29 RX ADMIN — LEVOTHYROXINE SODIUM 112 MCG: 112 TABLET ORAL at 08:25

## 2024-08-29 RX ADMIN — TOPIRAMATE 50 MG: 25 TABLET ORAL at 09:21

## 2024-08-29 RX ADMIN — SENNOSIDES AND DOCUSATE SODIUM 1 TABLET: 50; 8.6 TABLET ORAL at 08:25

## 2024-08-29 ASSESSMENT — ACTIVITIES OF DAILY LIVING (ADL)
ADLS_ACUITY_SCORE: 25
ADLS_ACUITY_SCORE: 29
ADLS_ACUITY_SCORE: 25
ADLS_ACUITY_SCORE: 29
ADLS_ACUITY_SCORE: 25
ADLS_ACUITY_SCORE: 29
ADLS_ACUITY_SCORE: 25

## 2024-08-29 NOTE — PROGRESS NOTES
Fabian Deras MD    08/29/24 0800   Appointment Info   Signing Clinician's Name / Credentials (OT) Mackenzie Ortez, OTR/L   Quick Adds   Quick Adds Certification       Present no   Living Environment   People in Home spouse   Current Living Arrangements house  (one level house)   Home Accessibility stairs to enter home   Number of Stairs, Main Entrance 1   Stair Railings, Main Entrance railings safe and in good condition;railings not in good condition, need repair for safe use;railing on right side (ascending)  (from front (Rt.), garage (Lt.))   Number of Stairs, Within Home, Primary none   Stair Railings, Within Home, Primary none  (from front (Rt.), garage (Lt.))   Transportation Anticipated family or friend will provide  (spouse will provide)   Living Environment Comments All needs met at the main level, walk in shower present (recently replaced with a new one), doesn't utilize AE at baseline, occasionally utilizes cane in yard.   Self-Care   Usual Activity Tolerance moderate   Current Activity Tolerance fair   Regular Exercise Yes   Activity/Exercise Type other (see comments)  (Water aerobics ( x4/ week))   Exercise Amount/Frequency 45 mins   Equipment Currently Used at Home cane, straight;grab bar, toilet   Fall history within last six months no   Activity/Exercise/Self-Care Comment Pt. is active, participates in water aerobics 4x/week for 45 mints   Instrumental Activities of Daily Living (IADL)   Previous Responsibilities meal prep;housekeeping;laundry;shopping;driving;yardwork;medication management;finances  (retired (worked as   at ))   IADL Comments Pt. is IND with all IADLs at baseline.   General Information   Onset of Illness/Injury or Date of Surgery 08/28/24   Referring Physician Fabian Deras MD   Patient/Family Therapy Goal Statement (OT) Pt. voices to go home   Additional Occupational Profile Info/Pertinent History of Current Problem As per chart;  "\"Reverse Total Shoulder Arthroplasty left, Left - Shoulder\".   Performance Patterns (Routines, Roles, Habits) The patient is a retired  who engages in water aerobics four times per week as part of her regular physical activity routine. For leisure, the patient enjoys creating scrapbooks, which involves fine motor coordination, visual attention, and cognitive organization skills.   Existing Precautions/Restrictions shoulder;weight bearing   Left Upper Extremity (Weight-bearing Status) non weight-bearing (NWB)   Right Upper Extremity (Weight-bearing Status) full weight-bearing (FWB)   Left Lower Extremity (Weight-bearing Status) full weight-bearing (FWB)   Right Lower Extremity (Weight-bearing Status) full weight-bearing (FWB)   General Observations and Info Pt. is alert and oriented, pleasant & receptive   Cognitive Status Examination   Orientation Status orientation to person, place and time   Cognitive Status Comments alert & oriented   Visual Perception   Visual Impairment/Limitations corrective lenses for distance;corrective lenses for reading   Impact of Vision Impairment on Function (Vision) wears glassess   Sensory   Sensory Quick Adds sensation intact   Pain Assessment   Patient Currently in Pain No   Posture   Posture not impaired   Range of Motion Comprehensive   General Range of Motion no range of motion deficits identified   Strength Comprehensive (MMT)   General Manual Muscle Testing (MMT) Assessment no strength deficits identified   Muscle Tone Assessment   Muscle Tone Quick Adds No deficits were identified   Coordination   Upper Extremity Coordination No deficits were identified   Bed Mobility   Bed Mobility No deficits identified   Transfers   Transfers No deficits identified   Balance   Balance Assessment no deficits identified   Activities of Daily Living   BADL Assessment/Intervention upper body dressing;lower body dressing;clothing fastener management   Upper Body Dressing " Assessment/Training   Position (Upper Body Dressing) supported sitting   Dent Level (Upper Body Dressing) upper body dressing skills;doff;don   Lower Body Dressing Assessment/Training   Dent Level (Lower Body Dressing) lower body dressing skills;doff;don   Clothes Fastener Management   Dent Level (Clothes Fastener Management) clothes fastener management;buttons   Position (Clothes Fastener Management) supported sitting   Clinical Impression   Criteria for Skilled Therapeutic Interventions Met (OT) Evaluation only   OT Diagnosis ADL impairment, dec.tolerance and strength for IND with ADLs.   Influenced by the following impairments post surgical precautions   OT Problem List-Impairments impacting ADL problems related to;activity tolerance impaired;post-surgical precautions;range of motion (ROM);inability to direct their own care   Assessment of Occupational Performance 1-3 Performance Deficits   Identified Performance Deficits UB/LB dressing, grooming and hygiene   Planned Therapy Interventions (OT) ADL retraining;risk factor education;home program guidelines   Clinical Decision Making Complexity (OT) problem focused assessment/low complexity   Risk & Benefits of therapy have been explained evaluation/treatment results reviewed;care plan/treatment goals reviewed;risks/benefits reviewed;current/potential barriers reviewed;participants voiced agreement with care plan;participants included;patient   Clinical Impression Comments Pt. is active, alert and oriented, follows directions well, consistently adhering to post-surgical precautions   OT Total Evaluation Time   OT Eval, Low Complexity Minutes (05809) 15   Therapy Certification   Medical Diagnosis Reverse Total Shoulder Arthroplasty left, Left - Shoulder   Start of Care Date 08/29/24   Certification date from 08/29/24   Certification date to 09/12/24   OT Goals   Therapy Frequency (OT) One time eval and treatment   OT Goals Upper Body  Dressing;Lower Body Dressing;OT Goal 1;OT Goal 2   OT: Upper Body Dressing Modified independent   OT: Lower Body Dressing Modified independent   OT: Goal 1 Pt will verbalize and demonstrate understanding of HEP to mobilize shoulder within post-surgical precautions.   OT: Goal 2 Pt will verbalize and demonstrate understanding of post-surgical precautions for left TSA in prep of safe completion of daily tasks when recovering.   Interventions   Interventions Quick Adds Self-Care/Home Management;Therapeutic Procedures/Exercise   Self-Care/Home Management   Self-Care/Home Mgmt/ADL, Compensatory, Meal Prep Minutes (43887) 15   Symptoms Noted During/After Treatment (Meal Preparation/Planning Training) none   Treatment Detail/Skilled Intervention Patient received skilled education on post-surgical shoulder precautions, including (NWB) status, restrictions on lifting, and limitations on shoulder movement (no external/internal rotation and no flexion beyond 90 degrees). Instruction was provided on the proper technique for donning and doffing the shoulder sling, emphasizing the importance of wearing the sling at all times except during bathing, skin checks, UB dressing , and prescribed exercises. The patient demonstrated understanding through verbal confirmation.    Skilled instruction was also provided on one-arm dressing techniques, utilizing verbal guidance, demonstrations, and a handout. The patient was able to don a button-up shirt using a modified assistance technique, requiring help to place the right arm in the sleeve and to button the shirt due to a nerve block. Recommendations were made for the patient to wear loose-fitting clothing to facilitate increased  (IND) with dressing.   Further education was provided on LB dressing strategies, instructing the patient to pull undergarments and pants up to the knees while seated using the non-surgical  (UE) before standing to complete the task. The patient demonstrated  understanding and successfully donned underwear and pants with min A.  In addition, skilled instruction was given on strategies to enhance IND and safety during bathing while adhering to post-surgical precautions. Recommendations included the use of a shower chair, long-handled sponge, placement of items within the reach, dressing stick,  pt exhibited receptiveness.   Therapeutic Procedures/Exercise   Therapeutic Procedure: strength, endurance, ROM, flexibillity minutes (20797) 10   Symptoms Noted During/After Treatment fatigue   Treatment Detail/Skilled Intervention Pt seated in chair. Education provided (verbal, demo, handout) on post-surgical UB exercises for LUE. Pt completes 5 exercises 10x each using AAROM d/t nerve block still in place: elbow flex, forearm sup/pron, wrist flex/ext, wrist uln/rad deviation and fist pumps. Education provided on pendulum exercise, pt demonstrates ability to complete using bed to stabilize RUE. Pt instructed to complete exercises 2-3x daily, modify or stop if painful, apply ice afterwards.   OT Discharge Planning   OT Plan evaluation only   OT Discharge Recommendation (DC Rec) home with assist   OT Rationale for DC Rec The patient is alert and oriented, requiring  (SBA) for mobility and demonstrating effective ambulation with SBA. The patient is close to (PLOF) and is expected to be safe for discharge home with spousal assistance, no deficits identified. The patient is consistently adhering to post-surgical precautions and has exhibited receptiveness to therapeutic interventions and education provided.   OT Brief overview of current status SBA for mobility, SBA: STS and ambulation.   OT Equipment Needed at Discharge shower chair   Total Session Time   Timed Code Treatment Minutes 25   Total Session Time (sum of timed and untimed services) 40   Psychosocial Support   Trust Relationship/Rapport care explained;choices provided;emotional support provided;empathic listening  provided;questions answered;questions encouraged;reassurance provided

## 2024-08-29 NOTE — PROGRESS NOTES
Pt has been controlling pain with scheduled tylenol. Successful results. Tolerating antibiotic well. Has been able to ambulate and urinate since admission. Expected discharge today.

## 2024-08-29 NOTE — PLAN OF CARE
"  Problem: Adult Inpatient Plan of Care  Goal: Plan of Care Review  Description: The Plan of Care Review/Shift note should be completed every shift.  The Outcome Evaluation is a brief statement about your assessment that the patient is improving, declining, or no change.  This information will be displayed automatically on your shift  note.  8/29/2024 1208 by Silvina Cornejo RN  Outcome: Progressing  8/29/2024 1016 by Silvina Cornejo RN  Outcome: Progressing  Goal: Patient-Specific Goal (Individualized)  Description: You can add care plan individualizations to a care plan. Examples of Individualization might be:  \"Parent requests to be called daily at 9am for status\", \"I have a hard time hearing out of my right ear\", or \"Do not touch me to wake me up as it startles  me\".  8/29/2024 1208 by Silvina Cornejo RN  Outcome: Progressing  8/29/2024 1016 by Silvina Cornejo RN  Outcome: Progressing  Goal: Absence of Hospital-Acquired Illness or Injury  8/29/2024 1208 by Silvina Cornejo RN  Outcome: Progressing  8/29/2024 1016 by Silvina Cornejo RN  Outcome: Progressing  Intervention: Identify and Manage Fall Risk  Recent Flowsheet Documentation  Taken 8/29/2024 0930 by Silvina Cornejo RN  Safety Promotion/Fall Prevention:   assistive device/personal items within reach   clutter free environment maintained   nonskid shoes/slippers when out of bed  Intervention: Prevent Skin Injury  Recent Flowsheet Documentation  Taken 8/29/2024 0930 by Silvina Cornejo RN  Body Position: position maintained  Intervention: Prevent and Manage VTE (Venous Thromboembolism) Risk  Recent Flowsheet Documentation  Taken 8/29/2024 0930 by Silvina Cornejo RN  VTE Prevention/Management: SCDs off (sequential compression devices)  Intervention: Prevent Infection  Recent Flowsheet Documentation  Taken 8/29/2024 0930 by Silvina Cornejo RN  Infection Prevention:   rest/sleep promoted   hand hygiene promoted  Goal: Optimal Comfort and Wellbeing  8/29/2024 1208 " by Plasek, Silvina, RN  Outcome: Progressing  8/29/2024 1016 by Silvina Cornejo RN  Outcome: Progressing  Intervention: Monitor Pain and Promote Comfort  Recent Flowsheet Documentation  Taken 8/29/2024 0825 by Silvina Cornejo RN  Pain Management Interventions:   medication (see MAR)   cold applied  Goal: Readiness for Transition of Care  8/29/2024 1208 by Silvina Cornejo RN  Outcome: Progressing  8/29/2024 1016 by Silvina Cornejo RN  Outcome: Progressing   Goal Outcome Evaluation:      Patient vital signs are at baseline: No,  Reason:  Dizziness with standing. Orthostatic changes noted. Getting a 500 ml bolus of LR and will recheck.  Patient able to ambulate as they were prior to admission or with assist devices provided by therapies during their stay:  Yes  Patient MUST void prior to discharge:  Yes  Patient able to tolerate oral intake:  Yes  Pain has adequate pain control using Oral analgesics:  Yes  Does patient have an identified :  Yes  Has goal D/C date and time been discussed with patient:  Yes

## 2024-08-29 NOTE — DISCHARGE INSTRUCTIONS
For your blood pressure:  - Stay well hydrated  - If you feel dizzy or lightheaded, check your blood pressure  - Hold your blood pressure medications if your systolic blood pressure (top number) is less than 100; if this occurs frequently, contact your PCP   - Contact your PCP or seek medical evaluation if your systolic blood pressure is less than 90

## 2024-08-29 NOTE — PROGRESS NOTES
[place] [facility] [place] DISCHARGE NOTE    Patient's /51. Luda PALACIOS updated. She added additional instructions regarding BP to the discharge paperwork. Reviewed with patient. Okay to discharge.Patient discharged to home at 1:16 PM via wheel chair. Accompanied by spouse and staff. Discharge instructions reviewed with patient and spouse, opportunity offered to ask questions. Prescriptions sent to patients preferred pharmacy. She canceled the oxycodone at her pharmacy. She has some at home and does not anticipate needing to take it. All belongings sent with patient.    Silvina Cornejo RN

## 2024-08-29 NOTE — PROGRESS NOTES
PRESENTING COMPLAINT:  Postoperative visit status post bilateral breast reduction done 01/12/2022 with right nipple necrosis.    HISTORY OF PRESENTING COMPLAINT:  Ms. Marks 76 years old, here for regular postoperative visit.  No major change, off of all antibiotics.  No pain.    PHYSICAL EXAMINATION:  Vital signs stable.  She is afebrile, in no acute distress.  The right nipple scab is now liquefying, no evidence of infection.    ASSESSMENT AND PLAN:  Based on the above findings, a diagnosis of right breast nipple necrosis, status post bilateral breast reduction was made.  Debrided the necrotic nipple to healthy bleeding tissue, and have advised daily dressing changes.  I will see her back every 2 weeks.  All questions answered.  She was happy with the visit.  All exam and discussion done in presence of my nurse, Janet Guzman.         pt requested his son to translate for him

## 2024-08-29 NOTE — DISCHARGE SUMMARY
[facility]ics Discharge Summary                                  [facility]     BARB WILEY [id number]   Age: 78 year old  PCP: Mireya Briscoe, [phone] 1945     Date of Admission:  8/28/2024  Date of Discharge::  8/29/2024  Discharge Physician:  Vasyl Reyes PA-C    Code status:  Full Code    Admission Information:  Admission Diagnosis:  Osteoarthritis of left shoulder, unspecified osteoarthritis type [M19.012]    Post-Operative Day: 1 Day Post-Op     Reason for admission:  The patient was admitted for the following:Procedure(s) (LRB):  Reverse Total Shoulder Arthroplasty left (Left)    Active Problems:    DJD of left shoulder      Allergies:  Codeine and Lisinopril    Following the procedure noted above the patient was transferred to the post-op floor and started on:    Therapy:  occupational therapy  Anticoagulation Plan:  mg daily  for 42 days  Pain Management: oxycodone, tylenol, and Robaxin  Weight bearing status: Non-weight bearing     The patient was followed and co-managed by the hospitalist service during the inpatient treatment course  Complications:  None  Consultations:  None     Pertinent Labs   Lab Results: personally reviewed.     Recent Labs   Lab Test 08/29/24  0540 08/28/24  1505 03/22/24  0527 03/21/24  0749 05/15/23  1043 05/12/22  0902 05/12/22  0902 12/23/21  1436 01/28/21  0958 09/23/20  1537 04/27/20  1419 11/26/19  0936   WBC  --  4.6  --  3.9*  --   --   --   --   --   --   --  4.8   HGB 10.7* 11.7 10.2* 11.2* 12.3   < > 12.1  --    < >  --   --  12.5   HCT  --  36.5  --  36.1  --   --   --   --   --   --   --  40.0   MCV  --  92  --  94  --   --   --   --   --   --   --  96   PLT  --  248  --  238  --   --   --   --   --   --   --  263   NA  --   --   --   --  139  --  140 143   < > 130*   < > 140   CRP  --   --   --   --   --   --   --   --   --  <2.9  --   --     < > = values in this interval not displayed.          Discharge  Information:  Condition at discharge: Stable  Discharge destination:  Discharged to home     Medications at discharge:  Current Discharge Medication List        START taking these medications    Details   methocarbamol (ROBAXIN) 500 MG tablet Take 1 tablet (500 mg) by mouth 4 times daily as needed for other (pain).  Qty: 60 tablet, Refills: 1    Associated Diagnoses: Status post reverse total replacement of left shoulder           CONTINUE these medications which have CHANGED    Details   acetaminophen (TYLENOL) 325 MG tablet Take 2 tablets (650 mg) by mouth every 4 hours as needed for other (mild pain).    Associated Diagnoses: Status post reverse total replacement of left shoulder      aspirin (ASA) 325 MG EC tablet Take 1 tablet (325 mg) by mouth daily.  Qty: 30 tablet, Refills: 0    Associated Diagnoses: Status post reverse total replacement of left shoulder      oxyCODONE (ROXICODONE) 5 MG tablet Take 1-2 tablets (5-10 mg) by mouth every 4 hours as needed for moderate to severe pain.  Qty: 26 tablet, Refills: 0    Associated Diagnoses: Status post reverse total replacement of left shoulder           CONTINUE these medications which have NOT CHANGED    Details   alendronate (FOSAMAX) 70 MG tablet Take 70 mg by mouth every 7 days      amLODIPine (NORVASC) 10 MG tablet Take 1 tablet by mouth daily.      amoxicillin (AMOXIL) 500 MG tablet TAKE 4 TABLETS 1 HOUR BEFORE DENTAL APPOINTMENT.      buPROPion (WELLBUTRIN XL) 150 MG 24 hr tablet TAKE 1 TABLET EVERY MORNING  Qty: 90 tablet, Refills: 3    Associated Diagnoses: Moderate major depression (H); Adjustment disorder with mixed anxiety and depressed mood      celecoxib (CELEBREX) 200 MG capsule Take 1 capsule (200 mg) by mouth daily  Qty: 90 capsule, Refills: 3    Associated Diagnoses: Primary osteoarthritis, unspecified site      CENTRUM SILVER PO TABS Take one tablet by mouth every day  Refills: 3    Associated Diagnoses: Preop general physical exam;  Hypothyroidism; Gallbladder & bile duct stone with obstruction; Obesity      levothyroxine (SYNTHROID/LEVOTHROID) 112 MCG tablet Take 1 tablet (112 mcg) by mouth daily  Qty: 90 tablet, Refills: 3    Associated Diagnoses: Hypothyroidism, unspecified type      losartan (COZAAR) 100 MG tablet Take 1 tablet by mouth daily.      magnesium 250 MG tablet Take 1 tablet by mouth daily      phentermine 30 MG capsule Take 1 capsule by mouth daily. Before a meal      rosuvastatin (CRESTOR) 20 MG tablet Take 1 tablet by mouth at bedtime      topiramate (TOPAMAX) 50 MG tablet Take 1 tablet (50 mg) by mouth 2 times daily  Qty: 180 tablet, Refills: 3    Associated Diagnoses: BMI 35.0-35.9,adult      VITAMIN D3 1000 UNIT PO CAPS Take 1 capsule by mouth daily                        Follow-Up Care:  Patient should be seen in the office in 14 days by the Orthopedic Surgeon/Physician Assistant.  Call [phone] for appointment or questions.  MD Vasyl Ervin PA-C

## 2024-08-29 NOTE — PROGRESS NOTES
[facility] Medicine Progress Note  Date of Service: 08/29/2024    Assessment & Plan   Marilee Marks is a 78 year old female who presented on 8/28/2024 for scheduled Procedure(s):  Reverse Total Shoulder Arthroplasty left by Fabian Deras MD and is being followed by the hospital medicine service for co-management of acute and/or chronic perioperative medical problems.    DJD of left shoulder  S/p Procedure(s):  Reverse Total Shoulder Arthroplasty left on 8/28/24  1 Day Post-Op   Hemoglobin 11.7 (pre-op) ? 10.7   - pain control, wound cares, physical therapy, occupational therapy and DVT prophylaxis per orthopedic surgery service    Benign essential hypertension  Has hypertension at baseline managed with amlodipine 10 mg daily and losartan 100 mg daily.   Blood pressure stable post-op, although patient reported mild lightheadedness when up on POD#1. Minimal orthostasis.   - Home amlodipine and losartan resumed with home blood pressure monitoring instructions   - 500 ml LR bolus, patient can likely discharge after bolus if vitals remain stable    Personal history of DVT (deep vein thrombosis)  Occurred in 2014, provoked after a knee surgery. Was not taking her prescribed Xarelto and was on HRT at the time. Ortho prescribed aspirin 325 mg daily and ambulation for prophylaxis (patient able to ambulate normally, as she had shoulder surgery).   - Aspirin 325 mg daily     Nonobstructive atherosclerosis of coronary artery  Dyslipidemia   History of LBBB followed by abnormal stress test in 2021. Coronary CTA with calcium score of 424 (83rd percentile), and moderate disease OM1, severe disease OM 3, and mild disease elsewhere. Managed prior to admission with aspirin 325 mg daily and Crestor 20 mg q hs, plus antihypertensives noted above.  - See above regarding aspirin and antihypertensives  - Continue statin     Hypothyroidism, unspecified type  Managed prior to admission with  levothyroxine 112 mcg daily, continue.     Hyperparathyroidism   Osteopenia / osteoporosis  S/p parathyroidectomy in 2023. Taking calcium carbonate and Fosamax.  - Continue calcium  - Fosamax not ordered during hospitalization, resume at discharge    Moderate major depression  Stable mood. Managed prior to admission with Wellbutrin  mg daily, continue.     Non morbid obesity  Managed prior to admission with phentermine 30 mg daily and topiramate 50 mg bid.   - Hold phentermine in hospital, resume at discharge  - Continue topiramate    Osteoarthritis  Managed prior to admission with Celebrex 200 mg daily. Takes for joint pains other than just her shoulder, has been on hold prior to surgery and would like to resume.  - Resume Celebrex        DVT Prophylaxis: as per orthopedic surgery service - aspirin 325 mg daily and ambulate  Code Status: Full Code    Lines: Peripheral   Gaviria catheter: No    Discussion: Medically, the patient appears to have minimal orthostasis but can likely discharge after an IV fluid bolus.    Disposition: Anticipate discharge today after IV fluid bolus. No barriers to discharge from internal medicine standpoint if vitals remain stable.     Attestation:  I have reviewed today's vital signs, notes, medications, labs and imaging.      Luda Mittal PA-C  [facility]ist [facility]         Interval History   Patient feeling well after surgery. Pain rated 0/10. Denies any new numbness or tingling since surgery.     Tolerating oral intake, denies abdominal pain, nausea, vomiting. Had a bowel movement this morning, is passing flatus. Urinating without difficulty, no feelings of incomplete bladder emptying.    Felt minimally lightheaded when up with therapy, but resolved after sitting / rest.     Denies chest pain, palpitations, cough, wheeze, shortness of breath, or other complaints.    Physical Exam   Temp:  [97.5  F (36.4  C)-98.3  F (36.8  C)] 97.8  F (36.6  C)  Pulse:  [64-85]  85  Resp:  [14-19] 18  BP: (121-190)/(48-82) 121/48  SpO2:  [91 %-99 %] 97 %    Weights:   Vitals:    08/28/24 1441   Weight: 83.5 kg (184 lb)    Body mass index is 31.58 kg/m .    General appearance: Awake, alert, and in no apparent distress. Pleasant and conversational, speaking in full sentences.  CV: Regular rhythm & rate, no murmurs. No edema. Peripheral pulses intact.  Respiratory: Non-labored breathing on room air. Moving air well bilaterally, no wheezing, crackles, or rhonchi.  GI: Non-distended, soft, nontender to palpation. No rebound or guarding. Normoactive bowel sounds.  Skin: Warm, dry, no rashes or ecchymoses. No mottling of skin. Aquacel dressing present on anterior left shoulder.   Musculoskeletal / extremities: Moves all extremities equally, no obvious abnormalities. Distal CMS intact.  Neurologic: No focal deficits.      Data   Recent Labs   Lab 08/29/24  0614 08/29/24  0540 08/28/24  1513 08/28/24  1505   WBC  --   --   --  4.6   HGB  --  10.7*  --  11.7   MCV  --   --   --  92   PLT  --   --   --  248   POTASSIUM  --   --   --  4.3   CR  --   --   --  0.79   *  --  82  --        Recent Labs   Lab 08/29/24  0614 08/28/24  1513   * 82        Unresulted Labs Ordered in the Past 30 Days of this Admission       No orders found for last 31 day(s).             Imaging  Recent Results (from the past 24 hour(s))   POC US GUIDANCE NEEDLE PLACEMENT    Impression    Ultrasound guided ISB, anatomic structures appeared normal.   XR Shoulder Left Port G/E 2 Views    Narrative    EXAM: XR SHOULDER LEFT PORT G/E 2 VIEWS  LOCATION: [facility]  DATE: 8/28/2024    INDICATION: Status post surgery  COMPARISON: MRI from 4/26/2024      Impression    IMPRESSION: Status post recent reverse left total shoulder arthroplasty. No immediate hardware complication. Expected postsurgical soft tissue swelling and subcutaneous emphysema. No acute fracture or malalignment. Osteopenia.     "    I reviewed all new labs and imaging results over the last 24 hours. I personally reviewed no images or EKG's today.    Medications   Current Facility-Administered Medications   Medication Dose Route Frequency Provider Last Rate Last Admin    BUPivacaine liposome (EXPAREL) LA inj was given in the infiltration site to produce post-op analgesia. Duration of action is up to 72 hours. Other \"angela\" meds should not be given for 96 hours except for lidocaine 4% patch. This is for INFORMATION ONLY.   Does not apply Continuous PRN Fabian Deras MD        lactated ringers infusion   Intravenous Continuous Fabian Deras MD   Stopped at 08/29/24 0800     Current Facility-Administered Medications   Medication Dose Route Frequency Provider Last Rate Last Admin    acetaminophen (TYLENOL) tablet 975 mg  975 mg Oral Q8H Fabian Deras MD   975 mg at 08/29/24 0825    amLODIPine (NORVASC) tablet 10 mg  10 mg Oral Daily Luda Mittal PA-C   10 mg at 08/29/24 0825    aspirin (ASA) EC tablet 325 mg  325 mg Oral Daily Fabian Deras MD   325 mg at 08/29/24 0825    buPROPion (WELLBUTRIN XL) 24 hr tablet 150 mg  150 mg Oral QAM Luda Mittal PA-C   150 mg at 08/29/24 0834    famotidine (PEPCID) tablet 20 mg  20 mg Oral BID Fabian Deras MD   20 mg at 08/29/24 0825    Or    famotidine (PEPCID) injection 20 mg  20 mg Intravenous BID Fabian Deras MD        lactated ringers BOLUS 500 mL  500 mL Intravenous Once Luda Mittal PA-C 500 mL/hr at 08/29/24 1149 500 mL at 08/29/24 1149    levothyroxine (SYNTHROID/LEVOTHROID) tablet 112 mcg  112 mcg Oral Daily Luda Mittal PA-C   112 mcg at 08/29/24 0825    losartan (COZAAR) tablet 100 mg  100 mg Oral Daily Luda Mittal PA-C   100 mg at 08/29/24 0825    magnesium oxide (MAG-OX) half-tab 200 mg  200 mg Oral Daily Fabian Deras MD   200 mg at 08/29/24 0921    polyethylene glycol (MIRALAX) Packet 17 g  17 g Oral Daily " Fabian Deras MD   17 g at 08/29/24 0824    rosuvastatin (CRESTOR) tablet 20 mg  20 mg Oral At Bedtime Luda Mittal PA-C        senna-docusate (SENOKOT-S/PERICOLACE) 8.6-50 MG per tablet 1 tablet  1 tablet Oral BID Fabian Deras MD   1 tablet at 08/29/24 0825    sodium chloride (PF) 0.9% PF flush 3 mL  3 mL Intracatheter Q8H Fabian Deras MD   3 mL at 08/29/24 1149    topiramate (TOPAMAX) tablet 50 mg  50 mg Oral BID Luda Mittal PA-C   50 mg at 08/29/24 0921       Luda Mittal PA-C  [facility]

## 2024-08-29 NOTE — PROGRESS NOTES
"[facility] Orthopaedics Progress Note      Post-operative Day: 1 Day Post-Op    Procedure(s):  Reverse Total Shoulder Arthroplasty left  Subjective:    Pt reports minimal pain; she is happy that the block worked well. She passed OT and is feeling well; she did have a little initial dizziness with getting up but it has improved.     Chest pain, SOB:  no  Nausea, vomiting:  no  Lightheadedness, dizziness:  no      Objective:  Blood pressure 127/65, pulse 85, temperature 97.8  F (36.6  C), temperature source Oral, resp. rate 18, height 1.626 m (5' 4\"), weight 83.5 kg (184 lb), SpO2 97%, not currently breastfeeding.    Patient Vitals for the past 24 hrs:   BP Temp Temp src Pulse Resp SpO2 Height Weight   08/29/24 0756 127/65 97.8  F (36.6  C) Oral 85 18 97 % -- --   08/29/24 0316 (!) 147/66 97.5  F (36.4  C) Oral 82 -- 96 % -- --   08/29/24 0037 -- -- -- 81 -- -- -- --   08/28/24 2213 132/70 -- -- 80 16 96 % -- --   08/28/24 1951 136/60 -- -- -- -- -- -- --   08/28/24 1905 (!) 190/82 97.8  F (36.6  C) Oral 76 18 95 % -- --   08/28/24 1845 (!) 148/74 -- -- 77 15 96 % -- --   08/28/24 1842 -- -- -- -- -- 94 % -- --   08/28/24 1830 (!) 149/73 -- -- 78 14 94 % -- --   08/28/24 1815 125/68 -- -- 78 19 91 % -- --   08/28/24 1813 131/71 -- -- 79 -- -- -- --   08/28/24 1808 -- 98.3  F (36.8  C) Axillary -- 14 95 % -- --   08/28/24 1441 (!) 152/65 97.5  F (36.4  C) Oral 64 14 99 % 1.626 m (5' 4\") 83.5 kg (184 lb)       Wt Readings from Last 4 Encounters:   08/28/24 83.5 kg (184 lb)   03/21/24 91.6 kg (201 lb 15.1 oz)   06/08/23 89.4 kg (197 lb)   05/15/23 91.7 kg (202 lb 2 oz)       Gen: A&O x 3. NAD. Appears tired.  Wound status: Covered, Aquacel dressing is c/d/i.   Circulation, motion and sensation: Hand and wrist ROM intact and equal bilaterally; distal upper extremity sensation is intact and decreased on the left Fingers are warm and well perfused.   Swelling: mild    Pertinent Labs   Lab Results: personally reviewed. "     Recent Labs   Lab Test 08/29/24  0540 08/28/24  1505 03/22/24  0527 03/21/24  0749 05/15/23  1043 05/12/22  0902 05/12/22  0902 12/23/21  1436 01/28/21  0958 09/23/20  1537 04/27/20  1419 11/26/19  0936   WBC  --  4.6  --  3.9*  --   --   --   --   --   --   --  4.8   HGB 10.7* 11.7 10.2* 11.2* 12.3   < > 12.1  --    < >  --   --  12.5   HCT  --  36.5  --  36.1  --   --   --   --   --   --   --  40.0   MCV  --  92  --  94  --   --   --   --   --   --   --  96   PLT  --  248  --  238  --   --   --   --   --   --   --  263   NA  --   --   --   --  139  --  140 143   < > 130*   < > 140   CRP  --   --   --   --   --   --   --   --   --  <2.9  --   --     < > = values in this interval not displayed.       Plan:   Continue current cares and rehabilitation.  Anticoagulation protocol:  mg daily  x 42  days  Pain medications:  oxycodone, tylenol, and Robaxin  Weight bearing status:  NWB  Disposition:  Plan for discharge to home when medically stable and pain is controlled, cleared by therapy. Later this AM.             Report completed by:  Vasyl Reyes PA-C  Date: 8/29/2024  Time: 9:15 AM

## 2024-08-29 NOTE — PROGRESS NOTES
[facility] TRANSPORT NOTE  Data:   Reason for Transport:  Post Surgical    Marilee Marks was transported from PACU to Med/Surg via cart at 1911.  Patient was accompanied by Registered Nurse. Equipment used for transport: IV pump. Family was aware of reason for transport: yes    Action:  Report: Received from admitting RN    Response:  Patient's condition was stable.    Kvng Ariza RN

## 2024-08-30 NOTE — ANESTHESIA POSTPROCEDURE EVALUATION
Patient: Marilee Marks    Procedure: Procedure(s):  Reverse Total Shoulder Arthroplasty left       Anesthesia Type:  General    Note:  Disposition: Outpatient   Postop Pain Control: Uneventful            Sign Out: Well controlled pain   PONV: No   Neuro/Psych: Uneventful            Sign Out: Acceptable/Baseline neuro status   Airway/Respiratory: Uneventful            Sign Out: Acceptable/Baseline resp. status   CV/Hemodynamics: Uneventful            Sign Out: Acceptable CV status; No obvious hypovolemia; No obvious fluid overload   Other NRE: NONE   DID A NON-ROUTINE EVENT OCCUR? No           Last vitals:  Vitals Value Taken Time   /73 08/28/24 1834   Temp 36.8  C (98.3  F) 08/28/24 1808   Pulse 82 08/28/24 1843   Resp 16 08/28/24 1843   SpO2 97 % 08/28/24 1843   Vitals shown include unfiled device data.    Electronically Signed By: ALANIS Meyer CRNA  August 29, 2024  7:44 PM

## 2024-09-03 ENCOUNTER — TRANSCRIBE ORDERS (OUTPATIENT)
Dept: OTHER | Age: 79
End: 2024-09-03

## 2024-09-03 DIAGNOSIS — Z96.612 S/P REVERSE TOTAL SHOULDER ARTHROPLASTY, LEFT: Primary | ICD-10-CM

## 2024-09-05 ASSESSMENT — ACTIVITIES OF DAILY LIVING (ADL)
PUSHING_WITH_THE_INVOLVED_ARM: 8
PUTTING_ON_YOUR_PANTS: 8
WHEN_LYING_ON_THE_INVOLVED_SIDE: 10
PLEASE_INDICATE_YOR_PRIMARY_REASON_FOR_REFERRAL_TO_THERAPY:: SHOULDER
AT_ITS_WORST?: 7
PUTTING_ON_A_SHIRT_THAT_BUTTONS_DOWN_THE_FRONT: 5
REMOVING_SOMETHING_FROM_YOUR_BACK_POCKET: 6
WASHING_YOUR_HAIR?: 7
WASHING_YOUR_BACK: 6
REACHING_FOR_SOMETHING_ON_A_HIGH_SHELF: 10
TOUCHING_THE_BACK_OF_YOUR_NECK: 10
PLACING_AN_OBJECT_ON_A_HIGH_SHELF: 9
CARRYING_A_HEAVY_OBJECT_OF_10_POUNDS: 7
PUTTING_ON_AN_UNDERSHIRT_OR_A_PULLOVER_SWEATER: 8

## 2024-09-10 ENCOUNTER — THERAPY VISIT (OUTPATIENT)
Dept: PHYSICAL THERAPY | Facility: CLINIC | Age: 79
End: 2024-09-10
Attending: ORTHOPAEDIC SURGERY
Payer: COMMERCIAL

## 2024-09-10 DIAGNOSIS — Z96.612 S/P REVERSE TOTAL SHOULDER ARTHROPLASTY, LEFT: Primary | ICD-10-CM

## 2024-09-10 PROCEDURE — 97110 THERAPEUTIC EXERCISES: CPT | Mod: GP | Performed by: PHYSICAL THERAPIST

## 2024-09-10 PROCEDURE — 97161 PT EVAL LOW COMPLEX 20 MIN: CPT | Mod: GP | Performed by: PHYSICAL THERAPIST

## 2024-09-10 NOTE — PROGRESS NOTES
PHYSICAL THERAPY EVALUATION  Type of Visit: Evaluation              Subjective Had left reverse TSA on 8/28.  Had the other shoulder done earlier this year and now had the left shoulder replaced. Has been doing some of the exercises from the previous shoulder on the left including pendulum and gripping exercises.  Pain has been pretty good. Wearing sling, has 2 week follow up tomorrow.       Presenting condition or subjective complaint: Therapy for reverse total shoulder replacement  Date of onset: 08/28/24    Relevant medical history:   right reverse TSA, HTN, hypothyroidism  Dates & types of surgery:      Prior diagnostic imaging/testing results: MRI; X-ray     Prior therapy history for the same diagnosis, illness or injury: No      Prior Level of Function  Transfers:   Ambulation:   ADL:   IADL:     Living Environment  Social support: With a significant other or spouse   Type of home: House   Stairs to enter the home: Yes 2 Is there a railing: Yes     Ramp:     Stairs inside the home: No       Help at home: Assist for driving and community activities  Equipment owned: [email or web address]     Employment: No    Hobbies/Interests: Crafts  Gardening  Volunteering    Patient goals for therapy:      Pain assessment:      Objective   SHOULDER EVALUATION  PAIN: Pain Level at Rest: 5/10  Pain Level with Use: 5/10  Pain Location: shoulder  Pain is Exacerbated By: moving arm  Pain is Relieved By: cold and extra strength tylenol  INTEGUMENTARY (edema, incisions): WNL, incision covered with aquacell bandage, no signs of infection.   POSTURE: Sitting Posture: Rounded shoulders, Forward head  ROM:   (Degrees) Left AROM Left PROM Right AROM  Right PROM   Shoulder Flexion NA 90     Shoulder Extension       Shoulder Abduction  80     Shoulder Adduction       Shoulder Internal Rotation  NA     Shoulder External Rotation  To 0 with elbow at side     Elbow Extension  WNL     Elbow Flexion  30 from 0       CERVICAL SCREEN: WFL      Assessment &  Plan   CLINICAL IMPRESSIONS  Medical Diagnosis: s/p reverse TSA, left    Treatment Diagnosis: decreased left shoulder ROM   Impression/Assessment: Patient is a 78 year old female with s/p L Reverse TSA complaints.  The following significant findings have been identified: Pain, Decreased ROM/flexibility, Decreased joint mobility, Decreased strength, and Impaired muscle performance. These impairments interfere with their ability to perform self care tasks, recreational activities, and household chores as compared to previous level of function.     Clinical Decision Making (Complexity):  Clinical Presentation: Stable/Uncomplicated  Clinical Presentation Rationale: based on medical and personal factors listed in PT evaluation  Clinical Decision Making (Complexity): Low complexity    PLAN OF CARE  Treatment Interventions:  Modalities: E-stim  Interventions: Gait Training, Manual Therapy, Neuromuscular Re-education, Therapeutic Activity, Therapeutic Exercise, Self-Care/Home Management    Long Term Goals     PT Goal 1  Goal Identifier: 1  Goal Description: Patient will improve AROM L shoulder flexion to 90 degrees in order to reach into OH cupboard  Target Date: 10/22/24  PT Goal 2  Goal Identifier: 2  Goal Description: Patient will be independent in HEP in order to improve strength, ROM and pain outside of PT.  Target Date: 10/22/24  PT Goal 3  Goal Identifier: 3  Goal Description: Patient will improve left shoulder flexion strength to 4+/5 in order to lift 4# gallon of milk in/out of fridge.  Target Date: 11/05/24  PT Goal 4  Goal Identifier: 4  Goal Description: Patient will be able to reach behind head with left hand in order to brush her hair.  Target Date: 11/05/24      Frequency of Treatment: 1x/week  Duration of Treatment: 10 weeks    Recommended Referrals to Other Professionals:   Education Assessment:   Learner/Method: Patient  Education Comments: educated on role of PT, POC and HEP    Risks and benefits of  evaluation/treatment have been explained.   Patient/Family/caregiver agrees with Plan of Care.     Evaluation Time:     PT Eval, Low Complexity Minutes (40579): 10       Signing Clinician: DAVIS Thakur [facility]                                                                                   OUTPATIENT PHYSICAL THERAPY      PLAN OF TREATMENT FOR OUTPATIENT REHABILITATION   Patient's Last Name, First Name, Marilee Bond YOB: 1945   Provider's Name   [facility]   Medical Record No.  [id number]     Onset Date: 08/28/24  Start of Care Date: 09/10/24     Medical Diagnosis:  s/p reverse TSA, left      PT Treatment Diagnosis:  decreased left shoulder ROM Plan of Treatment  Frequency/Duration: 1x/week/ 10 weeks    Certification date from 09/10/24 to 11/19/24         See note for plan of treatment details and functional goals     Andressa Dotson PT                         I CERTIFY THE NEED FOR THESE SERVICES FURNISHED UNDER        THIS PLAN OF TREATMENT AND WHILE UNDER MY CARE .             Physician Signature               Date    X_____________________________________________________                  Referring Provider:  Fabian Deras    Initial Assessment  See Epic Evaluation- Start of Care Date: 09/10/24

## 2024-09-11 ENCOUNTER — TRANSFERRED RECORDS (OUTPATIENT)
Dept: HEALTH INFORMATION MANAGEMENT | Facility: CLINIC | Age: 79
End: 2024-09-11
Payer: COMMERCIAL

## 2024-09-18 ENCOUNTER — THERAPY VISIT (OUTPATIENT)
Dept: PHYSICAL THERAPY | Facility: CLINIC | Age: 79
End: 2024-09-18
Attending: ORTHOPAEDIC SURGERY
Payer: COMMERCIAL

## 2024-09-18 DIAGNOSIS — Z96.612 S/P REVERSE TOTAL SHOULDER ARTHROPLASTY, LEFT: Primary | ICD-10-CM

## 2024-09-18 PROCEDURE — 97530 THERAPEUTIC ACTIVITIES: CPT | Mod: GP | Performed by: PHYSICAL THERAPIST

## 2024-09-18 PROCEDURE — 97110 THERAPEUTIC EXERCISES: CPT | Mod: GP | Performed by: PHYSICAL THERAPIST

## 2024-09-25 ENCOUNTER — THERAPY VISIT (OUTPATIENT)
Dept: PHYSICAL THERAPY | Facility: CLINIC | Age: 79
End: 2024-09-25
Attending: ORTHOPAEDIC SURGERY
Payer: COMMERCIAL

## 2024-09-25 DIAGNOSIS — Z96.612 S/P REVERSE TOTAL SHOULDER ARTHROPLASTY, LEFT: Primary | ICD-10-CM

## 2024-09-25 PROCEDURE — 97110 THERAPEUTIC EXERCISES: CPT | Mod: GP | Performed by: PHYSICAL THERAPIST

## 2024-10-01 ENCOUNTER — THERAPY VISIT (OUTPATIENT)
Dept: PHYSICAL THERAPY | Facility: CLINIC | Age: 79
End: 2024-10-01
Attending: ORTHOPAEDIC SURGERY
Payer: COMMERCIAL

## 2024-10-01 DIAGNOSIS — Z96.612 S/P REVERSE TOTAL SHOULDER ARTHROPLASTY, LEFT: Primary | ICD-10-CM

## 2024-10-01 PROCEDURE — 97110 THERAPEUTIC EXERCISES: CPT | Mod: GP | Performed by: PHYSICAL THERAPIST

## 2024-10-09 ENCOUNTER — TRANSFERRED RECORDS (OUTPATIENT)
Dept: HEALTH INFORMATION MANAGEMENT | Facility: CLINIC | Age: 79
End: 2024-10-09

## 2024-10-22 ENCOUNTER — THERAPY VISIT (OUTPATIENT)
Dept: PHYSICAL THERAPY | Facility: CLINIC | Age: 79
End: 2024-10-22
Attending: ORTHOPAEDIC SURGERY
Payer: COMMERCIAL

## 2024-10-22 DIAGNOSIS — Z96.612 S/P REVERSE TOTAL SHOULDER ARTHROPLASTY, LEFT: Primary | ICD-10-CM

## 2024-10-22 PROCEDURE — 97140 MANUAL THERAPY 1/> REGIONS: CPT | Mod: GP | Performed by: PHYSICAL THERAPIST

## 2024-10-29 ENCOUNTER — THERAPY VISIT (OUTPATIENT)
Dept: PHYSICAL THERAPY | Facility: CLINIC | Age: 79
End: 2024-10-29
Attending: ORTHOPAEDIC SURGERY
Payer: COMMERCIAL

## 2024-10-29 DIAGNOSIS — Z96.612 S/P REVERSE TOTAL SHOULDER ARTHROPLASTY, LEFT: Primary | ICD-10-CM

## 2024-10-29 PROCEDURE — 97140 MANUAL THERAPY 1/> REGIONS: CPT | Mod: GP | Performed by: PHYSICAL THERAPIST

## 2024-11-05 ENCOUNTER — THERAPY VISIT (OUTPATIENT)
Dept: PHYSICAL THERAPY | Facility: CLINIC | Age: 79
End: 2024-11-05
Attending: ORTHOPAEDIC SURGERY
Payer: COMMERCIAL

## 2024-11-05 DIAGNOSIS — Z96.612 S/P REVERSE TOTAL SHOULDER ARTHROPLASTY, LEFT: Primary | ICD-10-CM

## 2024-11-05 PROCEDURE — 97530 THERAPEUTIC ACTIVITIES: CPT | Mod: GP | Performed by: PHYSICAL THERAPIST

## 2024-11-05 PROCEDURE — 97110 THERAPEUTIC EXERCISES: CPT | Mod: GP | Performed by: PHYSICAL THERAPIST

## 2024-11-12 ENCOUNTER — THERAPY VISIT (OUTPATIENT)
Dept: PHYSICAL THERAPY | Facility: CLINIC | Age: 79
End: 2024-11-12
Attending: ORTHOPAEDIC SURGERY
Payer: COMMERCIAL

## 2024-11-12 DIAGNOSIS — Z96.612 S/P REVERSE TOTAL SHOULDER ARTHROPLASTY, LEFT: Primary | ICD-10-CM

## 2024-11-12 PROCEDURE — 97110 THERAPEUTIC EXERCISES: CPT | Mod: GP | Performed by: PHYSICAL THERAPIST

## 2024-11-12 NOTE — PROGRESS NOTES
[facility]                                                                                   OUTPATIENT PHYSICAL THERAPY    PLAN OF TREATMENT FOR OUTPATIENT REHABILITATION   Patient's Last Name, First Name, Marilee Bond YOB: 1945   Provider's Name   [facility]   Medical Record No.  [id number]     Onset Date: 08/28/24  Start of Care Date: 09/10/24     Medical Diagnosis:  s/p reverse TSA, left      PT Treatment Diagnosis:  decreased left shoulder ROM Plan of Treatment  Frequency/Duration: 1x/week/ 8 weeks    Certification date from 11/12/24 to 01/07/25         See note for plan of treatment details and functional goals     Andressa Dotson PT                         I CERTIFY THE NEED FOR THESE SERVICES FURNISHED UNDER        THIS PLAN OF TREATMENT AND WHILE UNDER MY CARE .             Physician Signature               Date    X_____________________________________________________                  Referring Provider:  Fabian Deras    Initial Assessment  See Epic Evaluation- Start of Care Date: 09/10/24            PLAN  Continue therapy per current plan of care.  Progress the past few weeks has been slower due to sharp pains in the left shoulder after sudden onset of pain after waking at night a few weeks ago. After Manual therpay and progressing to IR strengthening per protocol sharp pain has been subsiding and we have been able to progress back towards strengthening and AROM per protocol.     Beginning/End Dates of Progress Note Reporting Period:  09/10/24 to 11/12/2024    Referring Provider:  Fabian Deras       11/12/24 0500   Appointment Info   Signing clinician's name / credentials Andressa Dotson PT DPT   Visits Used 8   Medical Diagnosis s/p reverse TSA, left   PT Tx Diagnosis decreased left shoulder ROM   Progress Note/Certification   Start of Care Date 09/10/24   Onset of illness/injury or Date of Surgery 08/28/24    Therapy Frequency 1x/week   Predicted Duration 8 weeks   Certification date from 11/12/24   Certification date to 01/07/25   Progress Note Completed Date 09/10/24   GOALS   PT Goals 2;3;4   PT Goal 1   Goal Identifier 1   Goal Description Patient will improve AROM L shoulder flexion to 90 degrees in order to reach into OH cupboard   Goal Progress can complete AAROM to 90 flexion   Target Date 10/22/24   PT Goal 2   Goal Identifier 2   Goal Description Patient will be independent in HEP in order to improve strength, ROM and pain outside of PT.   Target Date 10/22/24   Date Met 11/12/24   PT Goal 3   Goal Identifier 3   Goal Description Patient will improve left shoulder flexion strength to 4+/5 in order to lift 4# gallon of milk in/out of fridge.   Target Date 01/07/25   PT Goal 4   Goal Identifier 4   Goal Description Patient will be able to reach behind head with left hand in order to brush her hair.   Target Date 01/07/25   Subjective Report   Subjective Report Tripped over a kid at [employer] and landed on right elbow. but everything has felt okay.  Sharp pains have lessened quiet a bit on the left side since doing the new exercises. Hoping strength will start to improve to reach OH more.   Objective Measures   Objective Measures Objective Measure 1   Objective Measure 1   Objective Measure IR isometrics   Details not painful today   Treatment Interventions (PT)   Interventions Therapeutic Procedure/Exercise;Therapeutic Activity;Manual Therapy   Therapeutic Procedure/Exercise   PTRx Ther Proc 1 Shoulder Theraband Adduction - Peds   PTRx Ther Proc 1 - Details x 10 ytb   PTRx Ther Proc 2 Shoulder Theraband External Rotation Step Out   PTRx Ther Proc 2 - Details x 10 ytb cues on arm position   PTRx Ther Proc 3 Shoulder Theraband Internal Rotation   PTRx Ther Proc 3 - Details x 10 ytb   PTRx Ther Proc 4 Sidelying Shoulder Flexion   PTRx Ther Proc 4 - Details attempted supine x 5 but painful, switched to SL with can  pivoting on floor x 10   PTRx Ther Proc 5 Pendulum/Codmans   PTRx Ther Proc 5 - Details ed to continue at home   PTRx Ther Proc 6 Shoulder Theraband Rows   PTRx Ther Proc 6 - Details ed to cont at [facility]   PTRx Ther Proc 7 Standing Passive Shoulder Flexion   PTRx Ther Proc 7 - Details ed to cont at [facility]   PTRx Ther Proc 8 Pulley Shoulder Flexion   PTRx Ther Proc 8 - Details ed to cont at [facility]   Skilled Intervention progress per protocol into IR strengthening and improve AROM   Patient Response/Progress Progress the past few weeks has been slower due to sharp pains in the left shoulder after sudden onset of pain after waking at night a few weeks ago.  After Manual therpay and progressing to IR strengthening per protocol sharp pain has been subsiding and we have been able to progress back towards strengthening and AROM per protocol.   PTRx Ther Proc 9 Pulley Shoulder Scaption   PTRx Ther Proc 9 - Details ed to cont at [facility]   PTRx Ther Proc 10 Upper Trapezius Stretch   PTRx Ther Proc 10 - Details No Notes   Education   Learner/Method Patient   Education Comments educated on role of PT, POC and HEP   Plan   Home program PTRX code in snap shot (phone)   Plan for next session progress shoulder iR slowly but as tolerated with strengthening   Comments   Comments DOS 8/28/24,   5 weeks, 10/2/24

## 2024-11-26 ENCOUNTER — THERAPY VISIT (OUTPATIENT)
Dept: PHYSICAL THERAPY | Facility: CLINIC | Age: 79
End: 2024-11-26
Attending: ORTHOPAEDIC SURGERY
Payer: COMMERCIAL

## 2024-11-26 DIAGNOSIS — Z96.612 S/P REVERSE TOTAL SHOULDER ARTHROPLASTY, LEFT: Primary | ICD-10-CM

## 2024-11-26 PROCEDURE — 97110 THERAPEUTIC EXERCISES: CPT | Mod: GP | Performed by: PHYSICAL THERAPIST

## 2025-01-07 ENCOUNTER — THERAPY VISIT (OUTPATIENT)
Dept: PHYSICAL THERAPY | Facility: CLINIC | Age: 80
End: 2025-01-07
Attending: ORTHOPAEDIC SURGERY
Payer: COMMERCIAL

## 2025-01-07 DIAGNOSIS — Z96.612 S/P REVERSE TOTAL SHOULDER ARTHROPLASTY, LEFT: Primary | ICD-10-CM

## 2025-01-07 PROCEDURE — 97110 THERAPEUTIC EXERCISES: CPT | Mod: GP | Performed by: PHYSICAL THERAPIST

## 2025-01-07 NOTE — PROGRESS NOTES
PHYSICAL THERAPY DISCHARGE NOTE    Assessment: Patient has been seen for 10 visits for her L reverse total shoulder replacement. Motion and strength have been a bit slower to return but has steadily improved with each PT visit. At this time, patient now has AROM that allows her to do all her ADL's and has good understanding on how to continue to progress things independently at home.       DISCHARGE  Reason for Discharge: Patient has 3/4  goals and has ability to continue to progress towards final goal with being independent in her HEP       Equipment Issued: [employer]raband    Discharge Plan: Patient to continue home program.    Referring Provider:  Fbaian Deras       01/07/25 0500   Appointment Info   Signing clinician's name / credentials Andressa Dotson, PT DPT   Visits Used 10   Medical Diagnosis s/p reverse TSA, left   PT Tx Diagnosis decreased left shoulder ROM   Progress Note/Certification   Start of Care Date 09/10/24   Onset of illness/injury or Date of Surgery 08/28/24   Therapy Frequency 1x/week   Predicted Duration 8 weeks   Certification date from 11/12/24   Certification date to 01/07/25   Progress Note Completed Date 11/12/24   GOALS   PT Goals 2;3;4   PT Goal 1   Goal Identifier 1   Goal Description Patient will improve AROM L shoulder flexion to 90 degrees in order to reach into [place] cupboard   Goal Progress AROM flexion 90 in sitting   Target Date 10/22/24   Date Met 01/07/25   PT Goal 2   Goal Identifier 2   Goal Description Patient will be independent in HEP in order to improve strength, ROM and pain outside of PT.   Target Date 10/22/24   Date Met 11/12/24   PT Goal 3   Goal Identifier 3   Goal Description Patient will improve left shoulder flexion strength to 4+/5 in order to lift 4# gallon of milk in/out of fridge.   Goal Progress can lift 4-5# but hasn't tried a gallon of milk yet   Target Date 01/07/25   PT Goal 4   Goal Identifier 4   Goal Description Patient will be able to reach behind  head with left hand in order to brush her hair.   Target Date 01/07/25   Date Met 01/07/25   Subjective Report   Subjective Report L shoulder has been feeling pretty good. Has been doing supine AROM with 2# weights and going well. Still tough to reach up overhead with left but feels confident it will come with time just like her right arm did.   Objective Measures   Objective Measures Objective Measure 1   Objective Measure 1   Objective Measure shoulder AROM   Details standing: flexion 90,  supine flexion to 120   Treatment Interventions (PT)   Interventions Therapeutic Procedure/Exercise;Therapeutic Activity;Manual Therapy   Therapeutic Procedure/Exercise   Therapeutic Procedures: strength, endurance, ROM, flexibility minutes (44302) 24   PTRx Ther Proc 1 Supine Active Shoulder Flexion   PTRx Ther Proc 1 - Details 2# weights x 10, reclined position x 10 on right, ed pt on how to progress up to 30 reps and then add weight in relclined position. ed pt if that becomes easy then progress to standing with no weight progressing up to 30 reps then adding 1-2# weights   PTRx Ther Proc 2 Shoulder Abduction - Peds   PTRx Ther Proc 2 - Details x 10 cues to not lift UT   PTRx Ther Proc 3 Shoulder Theraband Rows   PTRx Ther Proc 3 - Details ed to continue at home   PTRx Ther Proc 4 Shoulder Theraband External Rotation Step Out   PTRx Ther Proc 4 - Details ed to continue at home   PTRx Ther Proc 5 Shoulder Theraband Internal Rotation   PTRx Ther Proc 5 - Details ed to continue at home   PTRx Ther Proc 6 Pendulum/Codmans   PTRx Ther Proc 6 - Details ed to continue at home   PTRx Ther Proc 7 Standing Passive Shoulder Flexion   PTRx Ther Proc 7 - Details ed to continue at home   PTRx Ther Proc 8 Pulley Shoulder Flexion   PTRx Ther Proc 8 - Details ed to continue at home   PTRx Ther Proc 9 Pulley Shoulder Scaption   PTRx Ther Proc 9 - Details ed to continue at home   PTRx Ther Proc 10 Upper Trapezius Stretch   PTRx Ther Proc 10 -  Details ed to continue at home   PTRx Ther Proc 11 Upper Trapezius Stretch   PTRx Ther Proc 11 - Details ed to continue at home   Skilled Intervention progress strengthening, ed pt on how to continue progressing shoulder strength to gain AROM in standing back.   Patient Response/Progress understood, very motivated to keep going to get L arm as good as right arm   Education   Learner/Method Patient   Education Comments educated on role of PT, POC and HEP   Plan   Home program PTRX code in snap shot (phone)   Plan for next session d/c chart.   Comments   Comments Patient has been seen for 10 visits for her L reverse total shoulder replacement. Motion and strength have been a bit slower to return but has steadily improved with each PT visit. At this time, patient now has AROM that allows her to do all her ADL's and has good understanding on how to continue to progress things independently at home.   Total Session Time   Timed Code Treatment Minutes 24   Total Treatment Time (sum of timed and untimed services) 24           Andressa Dotson  PT, DPT       1/7/2025   [address] [address]   [email or web address]  Voicemail: [phone]

## 2025-03-05 ENCOUNTER — TRANSFERRED RECORDS (OUTPATIENT)
Dept: HEALTH INFORMATION MANAGEMENT | Facility: CLINIC | Age: 80
End: 2025-03-05
Payer: COMMERCIAL

## 2025-03-17 ENCOUNTER — TRANSFERRED RECORDS (OUTPATIENT)
Dept: HEALTH INFORMATION MANAGEMENT | Facility: CLINIC | Age: 80
End: 2025-03-17
Payer: COMMERCIAL

## (undated) DEVICE — CLOSURE SYS SKIN PREMIERPRO EXOFINFUSION 4X60CM 3473

## (undated) DEVICE — SUCTION IRR SYSTEM W/TIP INTERPULSE

## (undated) DEVICE — SUCTION TIP FLEXI CLEAR TIP DISP K62

## (undated) DEVICE — BONE CLEANING TIP INTERPULSE FEMORAL CANAL 0210-008-000

## (undated) DEVICE — BLADE SAW SAGITTAL STRK 18X90X1.27MM HD SYS 6 6118-127-090

## (undated) DEVICE — DRSG KERLIX 4 1/2"X4YDS ROLL 6730

## (undated) DEVICE — SOL WATER IRRIG 1000ML BOTTLE 07139-09

## (undated) DEVICE — SOL NACL 0.9% IRRIG 3000ML BAG 07972-08

## (undated) DEVICE — GLOVE BIOGEL PI MICRO INDICATOR UNDERGLOVE SZ 8.5 48985

## (undated) DEVICE — DRSG STERI STRIP 1/2X4" R1547

## (undated) DEVICE — BLADE KNIFE SURG 10 371110

## (undated) DEVICE — SU VICRYL 2-0 CT-1 36" UND J945H

## (undated) DEVICE — SU STRATAFIX MONOCRYL 3-0 SPIRAL PS-2 30CM SXMP1B106

## (undated) DEVICE — SU ETHIBOND 1 CT-1 30" X425H

## (undated) DEVICE — SLING ARM MED 0814-0063

## (undated) DEVICE — ESU ELEC BLADE 4" COATED

## (undated) DEVICE — GLOVE BIOGEL PI MICRO SZ 7.0 48570

## (undated) DEVICE — PAD CHUX UNDERPAD 30X30"

## (undated) DEVICE — ESU PENCIL SMOKE EVAC W/ROCKER SWITCH 0703-047-000

## (undated) DEVICE — ESU ELEC BLADE HEX-LOCKING 2.5" E1450X

## (undated) DEVICE — PEN MARKING SKIN W/LABELS 31145884

## (undated) DEVICE — BIT DRILL BIOMET PERIPHERAL SCR 2.7MM SS 405889

## (undated) DEVICE — GLOVE BIOGEL PI MICRO INDICATOR UNDERGLOVE SZ 7.5 48975

## (undated) DEVICE — GLOVE BIOGEL PI MICRO SZ 8.0 48580

## (undated) DEVICE — DRAPE BACK TABLE  44X90" 8377

## (undated) DEVICE — SUCTION TIP YANKAUER W/O VENT K86

## (undated) DEVICE — SPONGE LAP 18X18" X8435

## (undated) DEVICE — SOL NACL 0.9% IRRIG 500ML BOTTLE 2F7123

## (undated) DEVICE — SPONGE LAP 18X18" 1515

## (undated) DEVICE — ESU GROUND PAD ADULT W/CORD E7507

## (undated) DEVICE — BIT DRILL BIOMET CENTRAL SCR 3.2MM SS 405883

## (undated) DEVICE — SUCTION MANIFOLD NEPTUNE 2 SYS 1 PORT 702-025-000

## (undated) DEVICE — PEN MARKING SKIN W/PAPER RULER 31145785

## (undated) DEVICE — SOL NACL 0.9% IRRIG 1000ML BOTTLE 07138-09

## (undated) DEVICE — HOOD T4 PROTECTIVE STERI FACE SHIELD 400-800

## (undated) DEVICE — GOWN IMPERVIOUS SPECIALTY XLG/XLONG 32474

## (undated) DEVICE — LINEN TOWEL PACK X5 5464

## (undated) DEVICE — DRAPE POUCH INSTRUMENT 3 POCKET 1018L

## (undated) DEVICE — PREP CHLORAPREP 26ML TINTED ORANGE  260815

## (undated) DEVICE — DRAPE U SPLIT 74X120" 29440

## (undated) DEVICE — SUCTION MANIFOLD NEPTUNE 2 SYS 4 PORT 0702-020-000

## (undated) DEVICE — IMM KIT SHOULDER TMAX MASK FACE 7210559

## (undated) DEVICE — DRSG AQUACEL AG 3.5X12" HYDROFIBER 420670

## (undated) DEVICE — DRAPE IOBAN INCISE 23X17" 6650EZ

## (undated) DEVICE — DRAPE ARTHROSCOPY SHOULDER BEACHCHAIR 29369

## (undated) DEVICE — STPL SKIN 35W 059037

## (undated) DEVICE — PACK SHOULDER

## (undated) DEVICE — DRAPE IOBAN LG .375X23.5" 6648EZ

## (undated) DEVICE — GLOVE PROTEXIS W/NEU-THERA 7.0  2D73TE70

## (undated) DEVICE — PAD ARMBOARD FOAM EGGCRATE 50676-378

## (undated) DEVICE — PACK MINOR CUSTOM ASC

## (undated) DEVICE — SOL WATER IRRIG 500ML BOTTLE 2F7113

## (undated) RX ORDER — PROPOFOL 10 MG/ML
INJECTION, EMULSION INTRAVENOUS
Status: DISPENSED
Start: 2024-03-21

## (undated) RX ORDER — LIDOCAINE HYDROCHLORIDE 10 MG/ML
INJECTION, SOLUTION EPIDURAL; INFILTRATION; INTRACAUDAL; PERINEURAL
Status: DISPENSED
Start: 2024-08-28

## (undated) RX ORDER — BUPIVACAINE HYDROCHLORIDE 5 MG/ML
INJECTION, SOLUTION EPIDURAL; INTRACAUDAL
Status: DISPENSED
Start: 2024-08-28

## (undated) RX ORDER — DEXMEDETOMIDINE HYDROCHLORIDE 100 UG/ML
INJECTION, SOLUTION INTRAVENOUS
Status: DISPENSED
Start: 2024-03-21

## (undated) RX ORDER — HYDROMORPHONE HYDROCHLORIDE 1 MG/ML
INJECTION, SOLUTION INTRAMUSCULAR; INTRAVENOUS; SUBCUTANEOUS
Status: DISPENSED
Start: 2022-01-12

## (undated) RX ORDER — FENTANYL CITRATE 50 UG/ML
INJECTION, SOLUTION INTRAMUSCULAR; INTRAVENOUS
Status: DISPENSED
Start: 2022-01-12

## (undated) RX ORDER — PROPOFOL 10 MG/ML
INJECTION, EMULSION INTRAVENOUS
Status: DISPENSED
Start: 2022-01-12

## (undated) RX ORDER — ONDANSETRON 2 MG/ML
INJECTION INTRAMUSCULAR; INTRAVENOUS
Status: DISPENSED
Start: 2024-03-21

## (undated) RX ORDER — EPHEDRINE SULFATE 50 MG/ML
INJECTION, SOLUTION INTRAMUSCULAR; INTRAVENOUS; SUBCUTANEOUS
Status: DISPENSED
Start: 2022-01-12

## (undated) RX ORDER — ACETAMINOPHEN 325 MG/1
TABLET ORAL
Status: DISPENSED
Start: 2024-08-28

## (undated) RX ORDER — FENTANYL CITRATE 50 UG/ML
INJECTION, SOLUTION INTRAMUSCULAR; INTRAVENOUS
Status: DISPENSED
Start: 2024-03-21

## (undated) RX ORDER — DEXAMETHASONE SODIUM PHOSPHATE 4 MG/ML
INJECTION, SOLUTION INTRA-ARTICULAR; INTRALESIONAL; INTRAMUSCULAR; INTRAVENOUS; SOFT TISSUE
Status: DISPENSED
Start: 2024-08-28

## (undated) RX ORDER — CEFAZOLIN SODIUM 1 G/3ML
INJECTION, POWDER, FOR SOLUTION INTRAMUSCULAR; INTRAVENOUS
Status: DISPENSED
Start: 2022-01-12

## (undated) RX ORDER — ACETAMINOPHEN 325 MG/1
TABLET ORAL
Status: DISPENSED
Start: 2022-01-12

## (undated) RX ORDER — DEXAMETHASONE SODIUM PHOSPHATE 10 MG/ML
INJECTION, SOLUTION INTRAMUSCULAR; INTRAVENOUS
Status: DISPENSED
Start: 2024-03-21

## (undated) RX ORDER — TRANEXAMIC ACID 650 MG/1
TABLET ORAL
Status: DISPENSED
Start: 2024-08-28

## (undated) RX ORDER — PROPOFOL 10 MG/ML
INJECTION, EMULSION INTRAVENOUS
Status: DISPENSED
Start: 2024-08-28

## (undated) RX ORDER — DEXAMETHASONE SODIUM PHOSPHATE 4 MG/ML
INJECTION, SOLUTION INTRA-ARTICULAR; INTRALESIONAL; INTRAMUSCULAR; INTRAVENOUS; SOFT TISSUE
Status: DISPENSED
Start: 2022-01-12

## (undated) RX ORDER — CEFAZOLIN SODIUM/WATER 2 G/20 ML
SYRINGE (ML) INTRAVENOUS
Status: DISPENSED
Start: 2024-08-28

## (undated) RX ORDER — ACETAMINOPHEN 325 MG/1
TABLET ORAL
Status: DISPENSED
Start: 2024-03-21

## (undated) RX ORDER — CEFAZOLIN SODIUM/WATER 2 G/20 ML
SYRINGE (ML) INTRAVENOUS
Status: DISPENSED
Start: 2024-03-21

## (undated) RX ORDER — GABAPENTIN 100 MG/1
CAPSULE ORAL
Status: DISPENSED
Start: 2024-03-21

## (undated) RX ORDER — ONDANSETRON 2 MG/ML
INJECTION INTRAMUSCULAR; INTRAVENOUS
Status: DISPENSED
Start: 2024-08-28

## (undated) RX ORDER — TRANEXAMIC ACID 650 MG/1
TABLET ORAL
Status: DISPENSED
Start: 2024-03-21

## (undated) RX ORDER — ONDANSETRON 2 MG/ML
INJECTION INTRAMUSCULAR; INTRAVENOUS
Status: DISPENSED
Start: 2022-01-12

## (undated) RX ORDER — FENTANYL CITRATE 50 UG/ML
INJECTION, SOLUTION INTRAMUSCULAR; INTRAVENOUS
Status: DISPENSED
Start: 2024-08-28

## (undated) RX ORDER — NITROGLYCERIN 0.4 MG/1
TABLET SUBLINGUAL
Status: DISPENSED
Start: 2021-11-09

## (undated) RX ORDER — LIDOCAINE HYDROCHLORIDE 20 MG/ML
INJECTION, SOLUTION EPIDURAL; INFILTRATION; INTRACAUDAL; PERINEURAL
Status: DISPENSED
Start: 2022-01-12

## (undated) RX ORDER — GLYCOPYRROLATE 0.2 MG/ML
INJECTION INTRAMUSCULAR; INTRAVENOUS
Status: DISPENSED
Start: 2022-01-12